# Patient Record
Sex: FEMALE | Race: WHITE | Employment: FULL TIME | URBAN - METROPOLITAN AREA
[De-identification: names, ages, dates, MRNs, and addresses within clinical notes are randomized per-mention and may not be internally consistent; named-entity substitution may affect disease eponyms.]

---

## 2017-02-15 ENCOUNTER — TELEPHONE (OUTPATIENT)
Dept: INTERNAL MEDICINE CLINIC | Facility: CLINIC | Age: 47
End: 2017-02-15

## 2017-02-16 ENCOUNTER — HOSPITAL ENCOUNTER (OUTPATIENT)
Dept: MAMMOGRAPHY | Facility: HOSPITAL | Age: 47
Discharge: HOME | End: 2017-02-16
Attending: FAMILY MEDICINE
Payer: COMMERCIAL

## 2017-02-16 DIAGNOSIS — R92.8 FOLLOW-UP EXAMINATION OF ABNORMAL MAMMOGRAM: ICD-10-CM

## 2017-02-16 PROCEDURE — 77062 BREAST TOMOSYNTHESIS BI: CPT

## 2017-02-16 PROCEDURE — 77066 DX MAMMO INCL CAD BI: CPT

## 2017-02-16 PROCEDURE — 77065 DX MAMMO INCL CAD UNI: CPT

## 2017-02-16 PROCEDURE — 77061 BREAST TOMOSYNTHESIS UNI: CPT

## 2017-02-21 ENCOUNTER — OFFICE VISIT (OUTPATIENT)
Dept: INTERNAL MEDICINE CLINIC | Facility: CLINIC | Age: 47
End: 2017-02-21

## 2017-02-21 VITALS
OXYGEN SATURATION: 98 % | HEART RATE: 104 BPM | WEIGHT: 183.5 LBS | TEMPERATURE: 101 F | BODY MASS INDEX: 33 KG/M2 | DIASTOLIC BLOOD PRESSURE: 70 MMHG | SYSTOLIC BLOOD PRESSURE: 110 MMHG

## 2017-02-21 DIAGNOSIS — J02.0 ACUTE STREPTOCOCCAL PHARYNGITIS: Primary | ICD-10-CM

## 2017-02-21 DIAGNOSIS — J02.9 SORE THROAT: ICD-10-CM

## 2017-02-21 DIAGNOSIS — N76.0 ACUTE VAGINITIS: ICD-10-CM

## 2017-02-21 DIAGNOSIS — Z11.3 SCREENING FOR STD (SEXUALLY TRANSMITTED DISEASE): ICD-10-CM

## 2017-02-21 LAB
CONTROL LINE PRESENT WITH A CLEAR BACKGROUND (YES/NO): YES YES/NO
STREP GRP A CUL-SCR: POSITIVE

## 2017-02-21 PROCEDURE — 87480 CANDIDA DNA DIR PROBE: CPT | Performed by: FAMILY MEDICINE

## 2017-02-21 PROCEDURE — 87880 STREP A ASSAY W/OPTIC: CPT | Performed by: FAMILY MEDICINE

## 2017-02-21 PROCEDURE — 87591 N.GONORRHOEAE DNA AMP PROB: CPT | Performed by: FAMILY MEDICINE

## 2017-02-21 PROCEDURE — 87491 CHLMYD TRACH DNA AMP PROBE: CPT | Performed by: FAMILY MEDICINE

## 2017-02-21 PROCEDURE — 87660 TRICHOMONAS VAGIN DIR PROBE: CPT | Performed by: FAMILY MEDICINE

## 2017-02-21 PROCEDURE — 99214 OFFICE O/P EST MOD 30 MIN: CPT | Performed by: FAMILY MEDICINE

## 2017-02-21 PROCEDURE — 87510 GARDNER VAG DNA DIR PROBE: CPT | Performed by: FAMILY MEDICINE

## 2017-02-21 RX ORDER — AMOXICILLIN AND CLAVULANATE POTASSIUM 875; 125 MG/1; MG/1
1 TABLET, FILM COATED ORAL 2 TIMES DAILY
Qty: 20 TABLET | Refills: 0 | Status: SHIPPED | OUTPATIENT
Start: 2017-02-21 | End: 2017-06-05 | Stop reason: ALTCHOICE

## 2017-02-21 RX ORDER — METHYLPREDNISOLONE 4 MG/1
TABLET ORAL
Qty: 1 KIT | Refills: 0 | Status: SHIPPED | OUTPATIENT
Start: 2017-02-21 | End: 2017-06-05 | Stop reason: ALTCHOICE

## 2017-02-21 NOTE — PROGRESS NOTES
CHIEF COMPLAINT:   Patient presents with:  Fever: up to 102 F, sore throat, body aches       HPI:   Denny Mendez is a 55year old female who presents for upper respiratory symptoms for  5 days.  Patient reports sore throat, fever with Tmax to 102, OTC palpitations   GI: denies N/V/C or abdominal pain  : see HPI  NEURO: + headaches    EXAM:   /70 mmHg  Pulse 104  Temp(Src) 100.5 °F (38.1 °C) (Oral)  Wt 183 lb 8 oz  SpO2 98%  Breastfeeding?  No  GENERAL: well developed, well nourished,in no apparen Tylenol  as tolerated for pain or fever, and to use warm salt-water gargles frequently for throat relief. Diet as tolerated. Vaginal cultures today. Await results. Note given to Pt to return to work on 2/26/17.   The patient indicates understanding of the

## 2017-02-22 LAB
C TRACH DNA SPEC QL NAA+PROBE: NEGATIVE
N GONORRHOEA DNA SPEC QL NAA+PROBE: NEGATIVE

## 2017-02-23 ENCOUNTER — TELEPHONE (OUTPATIENT)
Dept: INTERNAL MEDICINE CLINIC | Facility: CLINIC | Age: 47
End: 2017-02-23

## 2017-02-23 DIAGNOSIS — N89.8 VAGINAL DISCHARGE: Primary | ICD-10-CM

## 2017-02-23 RX ORDER — METRONIDAZOLE 7.5 MG/G
1 GEL VAGINAL NIGHTLY
Qty: 5 TUBE | Refills: 0 | Status: SHIPPED | OUTPATIENT
Start: 2017-02-23 | End: 2017-02-28

## 2017-02-23 NOTE — TELEPHONE ENCOUNTER
Patient informed of orders, medication sent to [address] [place] [address] per patient's request

## 2017-03-08 ENCOUNTER — HOSPITAL ENCOUNTER (OUTPATIENT)
Age: 47
Discharge: HOME | End: 2017-03-08
Attending: FAMILY MEDICINE
Payer: COMMERCIAL

## 2017-03-08 ENCOUNTER — TELEPHONE (OUTPATIENT)
Dept: INTERNAL MEDICINE CLINIC | Facility: CLINIC | Age: 47
End: 2017-03-08

## 2017-03-08 ENCOUNTER — APPOINTMENT (OUTPATIENT)
Dept: GENERAL RADIOLOGY | Age: 47
End: 2017-03-08
Attending: FAMILY MEDICINE
Payer: COMMERCIAL

## 2017-03-08 VITALS
TEMPERATURE: 98 F | WEIGHT: 182 LBS | BODY MASS INDEX: 32 KG/M2 | RESPIRATION RATE: 18 BRPM | DIASTOLIC BLOOD PRESSURE: 84 MMHG | SYSTOLIC BLOOD PRESSURE: 121 MMHG | HEART RATE: 60 BPM | OXYGEN SATURATION: 100 %

## 2017-03-08 DIAGNOSIS — R51.9 NONINTRACTABLE HEADACHE, UNSPECIFIED CHRONICITY PATTERN, UNSPECIFIED HEADACHE TYPE: ICD-10-CM

## 2017-03-08 DIAGNOSIS — E86.0 DEHYDRATION: ICD-10-CM

## 2017-03-08 DIAGNOSIS — R42 VERTIGO: Primary | ICD-10-CM

## 2017-03-08 LAB
#LYMPHOCYTE IC: 2.4 X10ˆ3/UL (ref 0.9–3.2)
#MXD IC: 0.6 X10ˆ3/UL (ref 0.1–1)
#NEUTROPHIL IC: 4.7 X10ˆ3/UL (ref 1.3–6.7)
FREE T4: 1 NG/DL (ref 0.9–1.8)
HCT IC: 39.3 % (ref 37–54)
HGB IC: 13.8 G/DL (ref 11.7–16)
ISTAT TROPONIN: <0.1 NG/ML (ref ?–0.1)
LYMPHOCYTES NFR BLD AUTO: 30.8 %
MCH IC: 31.7 PG (ref 27–33.2)
MCHC IC: 35.1 G/DL (ref 31–37)
MCV IC: 90.1 FL (ref 81–100)
MIXED CELL %: 7.9 %
NEUTROPHILS NFR BLD AUTO: 61.3 %
PLT IC: 189 X10ˆ3/UL (ref 150–450)
POCT BILIRUBIN URINE: NEGATIVE
POCT BLOOD URINE: NEGATIVE
POCT GLUCOSE URINE: NEGATIVE MG/DL
POCT KETONE URINE: 15 MG/DL
POCT NITRITE URINE: NEGATIVE
POCT PH URINE: 5.5 (ref 5–8)
POCT PROTEIN URINE: NEGATIVE MG/DL
POCT SPECIFIC GRAVITY URINE: 1.03
POCT URINE COLOR: YELLOW
POCT URINE PREGNANCY: NEGATIVE
POCT UROBILINOGEN URINE: 0.2 MG/DL
RBC IC: 4.36 X10ˆ6/UL (ref 3.8–5.1)
TSI SER-ACNC: 0.29 MIU/ML (ref 0.35–5.5)
WBC IC: 7.7 X10ˆ3/UL (ref 4–13)

## 2017-03-08 PROCEDURE — 99215 OFFICE O/P EST HI 40 MIN: CPT

## 2017-03-08 PROCEDURE — 87086 URINE CULTURE/COLONY COUNT: CPT | Performed by: FAMILY MEDICINE

## 2017-03-08 PROCEDURE — 93010 ELECTROCARDIOGRAM REPORT: CPT

## 2017-03-08 PROCEDURE — 81025 URINE PREGNANCY TEST: CPT | Performed by: FAMILY MEDICINE

## 2017-03-08 PROCEDURE — 71020 XR CHEST PA + LAT CHEST (CPT=71020): CPT

## 2017-03-08 PROCEDURE — 96360 HYDRATION IV INFUSION INIT: CPT

## 2017-03-08 PROCEDURE — 84443 ASSAY THYROID STIM HORMONE: CPT | Performed by: FAMILY MEDICINE

## 2017-03-08 PROCEDURE — 93005 ELECTROCARDIOGRAM TRACING: CPT

## 2017-03-08 PROCEDURE — 81002 URINALYSIS NONAUTO W/O SCOPE: CPT | Performed by: FAMILY MEDICINE

## 2017-03-08 PROCEDURE — 85025 COMPLETE CBC W/AUTO DIFF WBC: CPT | Performed by: FAMILY MEDICINE

## 2017-03-08 PROCEDURE — 84439 ASSAY OF FREE THYROXINE: CPT | Performed by: FAMILY MEDICINE

## 2017-03-08 PROCEDURE — 84484 ASSAY OF TROPONIN QUANT: CPT

## 2017-03-08 RX ORDER — IBUPROFEN 600 MG/1
600 TABLET ORAL ONCE
Status: COMPLETED | OUTPATIENT
Start: 2017-03-08 | End: 2017-03-08

## 2017-03-08 RX ORDER — MECLIZINE HCL 12.5 MG/1
25 TABLET ORAL ONCE
Status: DISCONTINUED | OUTPATIENT
Start: 2017-03-08 | End: 2017-03-08

## 2017-03-08 RX ORDER — SODIUM CHLORIDE 9 MG/ML
1000 INJECTION, SOLUTION INTRAVENOUS ONCE
Status: COMPLETED | OUTPATIENT
Start: 2017-03-08 | End: 2017-03-08

## 2017-03-08 RX ORDER — MECLIZINE HYDROCHLORIDE 25 MG/1
25 TABLET ORAL 3 TIMES DAILY PRN
Qty: 9 TABLET | Refills: 0 | Status: SHIPPED | OUTPATIENT
Start: 2017-03-08 | End: 2017-03-11

## 2017-03-08 NOTE — ED PROVIDER NOTES
Patient Seen in: [facility] In [address]    History   Patient presents with:  Dizziness (neurologic)    Stated Complaint: 2 WKS SHAKEY,FATIGUE,PASSED OUT @ WORK TODAY & @ [address]    HPI  75-year-old female coming in with complaints of Tab,  Take 1,000 Units by mouth daily. Magnesium Oxide 400 (240 MG) MG Oral Tab,  Take 1 tablet by mouth daily. Amoxicillin-Pot Clavulanate (AUGMENTIN) 875-125 MG Oral Tab,  Take 1 tablet by mouth 2 (two) times daily.    methylPREDNISolone (MEDROL) 4 MG symmetric  Skin: Skin color, texture, turgor normal. No rashes or lesions  Neurologic: Alert and oriented X 3, normal strength and tone. Normal symmetric reflexes.  Normal coordination and gait  Mental status: Alert, oriented, thought content appropriate  C times daily as needed for Dizziness. Dispense:  9 tablet   Refill:  0      Thyroid studies still pending. Patient declined meclizine. Plan of care as discussed below. Headache completely resolved after IVF and Motrin was given.    Patient verbalized un

## 2017-03-08 NOTE — ED INITIAL ASSESSMENT (HPI)
Patient states for the last couple of weeks she has felt lethargic and light headed. Patient states last Friday she passed out and passed out today. Patient states she also feels like her speech is slurred as well.

## 2017-06-05 PROCEDURE — 83001 ASSAY OF GONADOTROPIN (FSH): CPT | Performed by: OBSTETRICS & GYNECOLOGY

## 2017-06-05 PROCEDURE — 87624 HPV HI-RISK TYP POOLED RSLT: CPT | Performed by: OBSTETRICS & GYNECOLOGY

## 2017-06-05 PROCEDURE — 88175 CYTOPATH C/V AUTO FLUID REDO: CPT | Performed by: OBSTETRICS & GYNECOLOGY

## 2017-06-05 PROCEDURE — 36415 COLL VENOUS BLD VENIPUNCTURE: CPT | Performed by: OBSTETRICS & GYNECOLOGY

## 2017-07-07 ENCOUNTER — HOSPITAL ENCOUNTER (OUTPATIENT)
Age: 47
Discharge: HOME | End: 2017-07-07
Payer: COMMERCIAL

## 2017-07-07 ENCOUNTER — APPOINTMENT (OUTPATIENT)
Dept: GENERAL RADIOLOGY | Age: 47
End: 2017-07-07
Attending: PHYSICIAN ASSISTANT
Payer: COMMERCIAL

## 2017-07-07 VITALS
HEART RATE: 75 BPM | BODY MASS INDEX: 33 KG/M2 | OXYGEN SATURATION: 98 % | SYSTOLIC BLOOD PRESSURE: 126 MMHG | DIASTOLIC BLOOD PRESSURE: 74 MMHG | WEIGHT: 189 LBS | RESPIRATION RATE: 14 BRPM | TEMPERATURE: 99 F

## 2017-07-07 DIAGNOSIS — R07.9 CHEST PAIN OF UNCERTAIN ETIOLOGY: ICD-10-CM

## 2017-07-07 DIAGNOSIS — R06.2 WHEEZING WITHOUT DIAGNOSIS OF ASTHMA: ICD-10-CM

## 2017-07-07 DIAGNOSIS — J01.90 ACUTE SINUSITIS, RECURRENCE NOT SPECIFIED, UNSPECIFIED LOCATION: Primary | ICD-10-CM

## 2017-07-07 LAB — POCT RAPID STREP: NEGATIVE

## 2017-07-07 PROCEDURE — 93010 ELECTROCARDIOGRAM REPORT: CPT

## 2017-07-07 PROCEDURE — 93005 ELECTROCARDIOGRAM TRACING: CPT

## 2017-07-07 PROCEDURE — 87081 CULTURE SCREEN ONLY: CPT | Performed by: PHYSICIAN ASSISTANT

## 2017-07-07 PROCEDURE — 94640 AIRWAY INHALATION TREATMENT: CPT

## 2017-07-07 PROCEDURE — 99214 OFFICE O/P EST MOD 30 MIN: CPT

## 2017-07-07 PROCEDURE — 87430 STREP A AG IA: CPT | Performed by: PHYSICIAN ASSISTANT

## 2017-07-07 PROCEDURE — 71020 XR CHEST PA + LAT CHEST (CPT=71020): CPT | Performed by: PHYSICIAN ASSISTANT

## 2017-07-07 RX ORDER — IPRATROPIUM BROMIDE AND ALBUTEROL SULFATE 2.5; .5 MG/3ML; MG/3ML
3 SOLUTION RESPIRATORY (INHALATION) ONCE
Status: COMPLETED | OUTPATIENT
Start: 2017-07-07 | End: 2017-07-07

## 2017-07-07 RX ORDER — ALBUTEROL SULFATE 90 UG/1
2 AEROSOL, METERED RESPIRATORY (INHALATION) EVERY 4 HOURS PRN
Qty: 1 INHALER | Refills: 0 | Status: SHIPPED | OUTPATIENT
Start: 2017-07-07 | End: 2017-08-06

## 2017-07-07 RX ORDER — AZITHROMYCIN 250 MG/1
TABLET, FILM COATED ORAL
Qty: 1 PACKAGE | Refills: 0 | Status: SHIPPED | OUTPATIENT
Start: 2017-07-07 | End: 2017-07-12

## 2017-07-07 RX ORDER — PREDNISONE 20 MG/1
60 TABLET ORAL ONCE
Status: COMPLETED | OUTPATIENT
Start: 2017-07-07 | End: 2017-07-07

## 2017-07-07 RX ORDER — PREDNISONE 20 MG/1
40 TABLET ORAL DAILY
Qty: 8 TABLET | Refills: 0 | Status: SHIPPED | OUTPATIENT
Start: 2017-07-07 | End: 2017-07-11

## 2017-07-07 RX ORDER — ACETAMINOPHEN AND CODEINE PHOSPHATE 120; 12 MG/5ML; MG/5ML
10 SOLUTION ORAL EVERY 6 HOURS PRN
Qty: 200 ML | Refills: 0 | Status: SHIPPED | OUTPATIENT
Start: 2017-07-07 | End: 2017-10-30 | Stop reason: ALTCHOICE

## 2017-07-07 NOTE — ED INITIAL ASSESSMENT (HPI)
Today c/o SOB and burning in chest. Nausea. Denies V/D.  x3 days, facial congestion. Yellow nasal drainage.

## 2017-07-07 NOTE — ED PROVIDER NOTES
Patient Seen in: [facility] END    History   Patient presents with:  Cough/URI    Stated Complaint: SINUS, COUGH, HEAVY CHEST, SORE THROAT, X 3DAYS    HPI    53 yo female here with c/o sore throat pain with sinus pain/pressure, prod complaint: SINUS, COUGH, HEAVY CHEST, SORE THROAT, X 3DAYS  Other systems are as noted in HPI. Constitutional and vital signs reviewed. All other systems reviewed and negative except as noted above.     PSFH elements reviewed from today and agreed exc NAD            EKG    Rate, intervals and axes as noted on EKG Report.   Rate:72 BPM  Rhythm: normal sinus rhythm  Reading: normal EKG         Xr Chest Pa + Lat Chest (gam=01236)    Result Date: 7/7/2017  PROCEDURE:  XR CHEST PA + LAT CHEST (CPT=71020)  IND disposition on file for this visit.     Follow-up:  Charity Gonzalez MD  [id number]  [phone]    Schedule an appointment as soon as possible for a visit  If symptoms worsen and/toussaint for F/U      Medications Prescribed:  Ronn

## 2017-10-06 ENCOUNTER — PRIOR ORIGINAL RECORDS (OUTPATIENT)
Dept: OTHER | Age: 47
End: 2017-10-06

## 2017-10-08 ENCOUNTER — PRIOR ORIGINAL RECORDS (OUTPATIENT)
Dept: OTHER | Age: 47
End: 2017-10-08

## 2017-10-09 PROBLEM — I48.91 ATRIAL FIBRILLATION (HCC): Status: ACTIVE | Noted: 2017-10-09

## 2017-10-10 ENCOUNTER — PRIOR ORIGINAL RECORDS (OUTPATIENT)
Dept: OTHER | Age: 47
End: 2017-10-10

## 2017-10-11 ENCOUNTER — PRIOR ORIGINAL RECORDS (OUTPATIENT)
Dept: OTHER | Age: 47
End: 2017-10-11

## 2017-10-12 ENCOUNTER — HOSPITAL ENCOUNTER (OUTPATIENT)
Dept: CV DIAGNOSTICS | Facility: HOSPITAL | Age: 47
Discharge: HOME | End: 2017-10-12
Attending: INTERNAL MEDICINE
Payer: COMMERCIAL

## 2017-10-12 DIAGNOSIS — R07.9 CHEST PAIN: ICD-10-CM

## 2017-10-12 DIAGNOSIS — I48.91 A-FIB (HCC): ICD-10-CM

## 2017-10-12 PROCEDURE — 93226 XTRNL ECG REC<48 HR SCAN A/R: CPT | Performed by: INTERNAL MEDICINE

## 2017-10-12 PROCEDURE — 93225 XTRNL ECG REC<48 HRS REC: CPT | Performed by: INTERNAL MEDICINE

## 2017-10-12 PROCEDURE — 93227 XTRNL ECG REC<48 HR R&I: CPT | Performed by: INTERNAL MEDICINE

## 2017-10-19 LAB
BUN: 9 MG/DL
CALCIUM: 9.1 MG/DL
CHLORIDE: 107 MEQ/L
CREATININE, SERUM: 0.7 MG/DL
GLUCOSE: 86 MG/DL
HEMATOCRIT: 41.5 %
HEMOGLOBIN: 14.1 G/DL
PLATELETS: 177 K/UL
POTASSIUM, SERUM: 3.9 MEQ/L
RED BLOOD COUNT: 4.6 X 10-6/U
SODIUM: 141 MEQ/L
THYROID STIMULATING HORMONE: 1.03 MLU/L
WHITE BLOOD COUNT: 5.5 X 10-3/U

## 2017-10-23 ENCOUNTER — HOSPITAL ENCOUNTER (OUTPATIENT)
Dept: CV DIAGNOSTICS | Facility: HOSPITAL | Age: 47
Discharge: HOME | End: 2017-10-23
Attending: INTERNAL MEDICINE
Payer: COMMERCIAL

## 2017-10-23 DIAGNOSIS — R07.9 CHEST PAIN: ICD-10-CM

## 2017-10-23 DIAGNOSIS — I48.91 A-FIB (HCC): ICD-10-CM

## 2017-10-23 PROCEDURE — 93018 CV STRESS TEST I&R ONLY: CPT | Performed by: INTERNAL MEDICINE

## 2017-10-23 PROCEDURE — 93350 STRESS TTE ONLY: CPT | Performed by: INTERNAL MEDICINE

## 2017-10-23 PROCEDURE — 93017 CV STRESS TEST TRACING ONLY: CPT | Performed by: INTERNAL MEDICINE

## 2017-10-26 ENCOUNTER — PRIOR ORIGINAL RECORDS (OUTPATIENT)
Dept: OTHER | Age: 47
End: 2017-10-26

## 2017-10-27 ENCOUNTER — MYAURORA ACCOUNT LINK (OUTPATIENT)
Dept: OTHER | Age: 47
End: 2017-10-27

## 2017-10-27 ENCOUNTER — HOSPITAL ENCOUNTER (EMERGENCY)
Facility: HOSPITAL | Age: 47
Discharge: HOME | End: 2017-10-27
Attending: EMERGENCY MEDICINE
Payer: COMMERCIAL

## 2017-10-27 ENCOUNTER — PRIOR ORIGINAL RECORDS (OUTPATIENT)
Dept: OTHER | Age: 47
End: 2017-10-27

## 2017-10-27 VITALS
HEART RATE: 71 BPM | BODY MASS INDEX: 31.18 KG/M2 | TEMPERATURE: 99 F | SYSTOLIC BLOOD PRESSURE: 123 MMHG | RESPIRATION RATE: 16 BRPM | HEIGHT: 63 IN | WEIGHT: 176 LBS | OXYGEN SATURATION: 97 % | DIASTOLIC BLOOD PRESSURE: 82 MMHG

## 2017-10-27 DIAGNOSIS — R55 SYNCOPE, NEAR: Primary | ICD-10-CM

## 2017-10-27 PROCEDURE — 84443 ASSAY THYROID STIM HORMONE: CPT | Performed by: EMERGENCY MEDICINE

## 2017-10-27 PROCEDURE — 99285 EMERGENCY DEPT VISIT HI MDM: CPT

## 2017-10-27 PROCEDURE — 93010 ELECTROCARDIOGRAM REPORT: CPT

## 2017-10-27 PROCEDURE — 93005 ELECTROCARDIOGRAM TRACING: CPT

## 2017-10-27 PROCEDURE — 84484 ASSAY OF TROPONIN QUANT: CPT | Performed by: EMERGENCY MEDICINE

## 2017-10-27 PROCEDURE — 85025 COMPLETE CBC W/AUTO DIFF WBC: CPT | Performed by: EMERGENCY MEDICINE

## 2017-10-27 PROCEDURE — 80053 COMPREHEN METABOLIC PANEL: CPT | Performed by: EMERGENCY MEDICINE

## 2017-10-27 PROCEDURE — 36415 COLL VENOUS BLD VENIPUNCTURE: CPT

## 2017-10-27 NOTE — ED PROVIDER NOTES
Patient Seen in: [facility] Emergency Department    History   Patient presents with:  Syncope (cardiovascular, neurologic)  Arrythmia/Palpitations (cardiovascular)    Stated Complaint: Near syncopal episode while getting out of shower, Hx afib diagn agreed except as otherwise stated in HPI.     Physical Exam   ED Triage Vitals [10/27/17 0744]  BP: (!) 123/110  Pulse: 82  Resp: 21  Temp: 98.7 °F (37.1 °C)  Temp src: Temporal  SpO2: 100 %  O2 Device: None (Room air)    Current:/72   Pulse 73   Temp Please view results for these tests on the individual orders. RAINBOW DRAW BLUE   RAINBOW DRAW LAVENDER   RAINBOW DRAW LIGHT GREEN   RAINBOW DRAW GOLD     EKG: The EKG revealed rate, intervals, and axis as noted on the EKG report.  I have r

## 2017-10-30 ENCOUNTER — OFFICE VISIT (OUTPATIENT)
Dept: INTERNAL MEDICINE CLINIC | Facility: CLINIC | Age: 47
End: 2017-10-30

## 2017-10-30 ENCOUNTER — OFFICE VISIT (OUTPATIENT)
Dept: SLEEP CENTER | Facility: HOSPITAL | Age: 47
End: 2017-10-30
Attending: INTERNAL MEDICINE
Payer: COMMERCIAL

## 2017-10-30 VITALS
SYSTOLIC BLOOD PRESSURE: 112 MMHG | BODY MASS INDEX: 31 KG/M2 | WEIGHT: 174.5 LBS | RESPIRATION RATE: 16 BRPM | TEMPERATURE: 98 F | DIASTOLIC BLOOD PRESSURE: 86 MMHG

## 2017-10-30 DIAGNOSIS — F41.9 ANXIETY: ICD-10-CM

## 2017-10-30 DIAGNOSIS — R07.9 CHEST PAIN, UNSPECIFIED TYPE: Primary | ICD-10-CM

## 2017-10-30 PROCEDURE — 99214 OFFICE O/P EST MOD 30 MIN: CPT | Performed by: FAMILY MEDICINE

## 2017-10-30 PROCEDURE — 95810 POLYSOM 6/> YRS 4/> PARAM: CPT

## 2017-10-30 RX ORDER — FLECAINIDE ACETATE 50 MG/1
TABLET ORAL
COMMUNITY
Start: 2017-10-27 | End: 2018-03-06 | Stop reason: DRUGHIGH

## 2017-10-30 RX ORDER — ALPRAZOLAM 0.25 MG/1
0.25 TABLET ORAL 2 TIMES DAILY PRN
Qty: 30 TABLET | Refills: 0 | Status: SHIPPED | OUTPATIENT
Start: 2017-10-30 | End: 2018-01-04

## 2017-10-30 NOTE — PROGRESS NOTES
CHIEF COMPLAINT:     Patient presents with:  Hospital F/U: Was in hospital with Afib       HPI:   Daniel Jolly is a 52year old female . The patient presents for a recheck after ER visit for complaints of atrial fib. Was seen 10/27.  History: palpitat Smokeless tobacco: Never Used                      Alcohol use:  No                 REVIEW OF SYSTEMS:   GENERAL: Denies fever, chills,weight change, decreased appetite  SKIN: Denies rashes, skin wound daily as needed for Anxiety. Dispense: 30 tablet; Refill: 0    The patient indicates understanding of these issues and agrees to the plan. The patient is asked to call with any concerns.   Chest pain, unspecified type  (primary encounter diagnosis)  Anxie

## 2017-11-02 ENCOUNTER — PRIOR ORIGINAL RECORDS (OUTPATIENT)
Dept: OTHER | Age: 47
End: 2017-11-02

## 2017-11-02 ENCOUNTER — MYAURORA ACCOUNT LINK (OUTPATIENT)
Dept: OTHER | Age: 47
End: 2017-11-02

## 2017-11-02 NOTE — PROCEDURES
[address] [address]       Accredited by the [facility] ([facility])    PATIENT'S NAME:        Stephane Montes  ATTENDING PHYSICIAN:   Ishmael Chapa M.D.   REFERRING PHYSICIAN:   Kristie Tafoya M.D. Respiratory monitoring revealed obstructive apneas and hypopneas with the total apnea-hypopnea index of 5 events per hour.   The supine apnea-hypopnea index was 29 events per hour, the non-supine apnea-hypopnea index was 1 event per hour, the REM apnea-hyp

## 2017-11-06 ENCOUNTER — TELEPHONE (OUTPATIENT)
Dept: INTERNAL MEDICINE CLINIC | Facility: CLINIC | Age: 47
End: 2017-11-06

## 2017-11-06 NOTE — TELEPHONE ENCOUNTER
Patient called to give an update on the medication that she has been taking and would like to speak with the nurse

## 2017-11-06 NOTE — TELEPHONE ENCOUNTER
Xanax made her sleepy or very aggressive /angry does not want to use this med any longer and does not want long term med either still scared Chest pain has subsided would rather talk to someone in office over taking meds

## 2017-11-13 ENCOUNTER — PRIOR ORIGINAL RECORDS (OUTPATIENT)
Dept: OTHER | Age: 47
End: 2017-11-13

## 2017-11-20 LAB
ALBUMIN: 4 G/DL
ALKALINE PHOSPHATATE(ALK PHOS): 75 IU/L
BILIRUBIN TOTAL: 0.3 MG/DL
BUN: 9 MG/DL
CALCIUM: 9.2 MG/DL
CHLORIDE: 109 MEQ/L
CREATININE, SERUM: 0.85 MG/DL
GLUCOSE: 94 MG/DL
HEMATOCRIT: 44.8 %
HEMOGLOBIN: 15.5 G/DL
PLATELETS: 230 K/UL
POTASSIUM, SERUM: 3.5 MEQ/L
PROTEIN, TOTAL: 7.5 G/DL
RED BLOOD COUNT: 5.01 X 10-6/U
SGOT (AST): 15 IU/L
SGPT (ALT): 24 IU/L
SODIUM: 143 MEQ/L
THYROID STIMULATING HORMONE: 1.41 MLU/L
WHITE BLOOD COUNT: 5.2 X 10-3/U

## 2017-12-20 LAB
BUN: 11 MG/DL
BUN: 12 MG/DL
CALCIUM: 10.2 MG/DL
CALCIUM: 8.8 MG/DL
CHLORIDE: 102 MEQ/L
CHLORIDE: 109 MEQ/L
CREATININE, SERUM: 0.6 MG/DL
CREATININE, SERUM: 0.8 MG/DL
GLUCOSE: 104 MG/DL
GLUCOSE: 97 MG/DL
HEMATOCRIT: 47.8 %
HEMOGLOBIN: 16.5 G/DL
MAGNESIUM: 2 MG/DL
PLATELETS: 221 K/UL
POTASSIUM, SERUM: 3.1 MEQ/L
POTASSIUM, SERUM: 4.4 MEQ/L
RED BLOOD COUNT: 5.36 X 10-6/U
SODIUM: 142 MEQ/L
SODIUM: 142 MEQ/L
THYROID STIMULATING HORMONE: 3 MLU/L
WHITE BLOOD COUNT: 9.14 X 10-3/U

## 2018-01-04 ENCOUNTER — OFFICE VISIT (OUTPATIENT)
Dept: INTERNAL MEDICINE CLINIC | Facility: CLINIC | Age: 48
End: 2018-01-04

## 2018-01-04 VITALS
WEIGHT: 176 LBS | RESPIRATION RATE: 16 BRPM | BODY MASS INDEX: 31 KG/M2 | TEMPERATURE: 99 F | HEART RATE: 72 BPM | DIASTOLIC BLOOD PRESSURE: 84 MMHG | OXYGEN SATURATION: 99 % | SYSTOLIC BLOOD PRESSURE: 130 MMHG

## 2018-01-04 DIAGNOSIS — J06.9 URI, ACUTE: Primary | ICD-10-CM

## 2018-01-04 PROCEDURE — 99213 OFFICE O/P EST LOW 20 MIN: CPT | Performed by: FAMILY MEDICINE

## 2018-01-04 RX ORDER — BENZONATATE 100 MG/1
100 CAPSULE ORAL 3 TIMES DAILY PRN
Qty: 30 CAPSULE | Refills: 0 | Status: SHIPPED | OUTPATIENT
Start: 2018-01-04 | End: 2018-02-03

## 2018-01-04 NOTE — PROGRESS NOTES
HPI:    Patient ID: Sheridan Pain is a 52year old female. HPI  HPI:   Sheridan Pain is a 52year old female who presents for upper respiratory symptoms for  2  days.  Patient reports mild ST, congestion, ear pressure  hoarsenss   no f/c   no HA clear  HEENT: atraumatic, normocephalic,ears and throat are clear  NECK: supple,no adenopathy,no bruits  LUNGS: clear to auscultation        ASSESSMENT AND PLAN:   Amber Fang is a 52year old female who presents with Upper Respiratory Infection.  JANET

## 2018-02-05 ENCOUNTER — PRIOR ORIGINAL RECORDS (OUTPATIENT)
Dept: OTHER | Age: 48
End: 2018-02-05

## 2018-03-05 ENCOUNTER — PRIOR ORIGINAL RECORDS (OUTPATIENT)
Dept: OTHER | Age: 48
End: 2018-03-05

## 2018-03-06 ENCOUNTER — LAB ENCOUNTER (OUTPATIENT)
Dept: LAB | Facility: HOSPITAL | Age: 48
End: 2018-03-06
Attending: INTERNAL MEDICINE
Payer: COMMERCIAL

## 2018-03-06 ENCOUNTER — HOSPITAL ENCOUNTER (OUTPATIENT)
Dept: CT IMAGING | Facility: HOSPITAL | Age: 48
Discharge: HOME | End: 2018-03-06
Attending: INTERNAL MEDICINE
Payer: COMMERCIAL

## 2018-03-06 ENCOUNTER — PRIOR ORIGINAL RECORDS (OUTPATIENT)
Dept: OTHER | Age: 48
End: 2018-03-06

## 2018-03-06 DIAGNOSIS — I48.20 CHRONIC ATRIAL FIBRILLATION (HCC): Primary | ICD-10-CM

## 2018-03-06 DIAGNOSIS — I48.20 ATRIAL FIBRILLATION, CHRONIC (HCC): ICD-10-CM

## 2018-03-06 PROBLEM — F41.9 ANXIETY: Status: ACTIVE | Noted: 2018-03-06

## 2018-03-06 LAB
BUN BLD-MCNC: 13 MG/DL (ref 8–20)
CREAT BLD-MCNC: 0.73 MG/DL (ref 0.55–1.02)

## 2018-03-06 PROCEDURE — 82565 ASSAY OF CREATININE: CPT

## 2018-03-06 PROCEDURE — 84520 ASSAY OF UREA NITROGEN: CPT

## 2018-03-06 PROCEDURE — 75572 CT HRT W/3D IMAGE: CPT | Performed by: INTERNAL MEDICINE

## 2018-03-06 PROCEDURE — 36415 COLL VENOUS BLD VENIPUNCTURE: CPT

## 2018-03-07 NOTE — PROGRESS NOTES
Patient presents with: Anxiety      HPI:  Pt is here for a recheck of anxiety. Has not been tolerating the Xanax well. Pt states it made her feel angry. Pt would like to try another prn anxiety medication though.  Pt is still having issues with the atrial Disp:  Rfl:    verapamil HCl  MG Oral Tab CR Take 180 mg by mouth every morning. Disp:  Rfl:    Cholecalciferol (VITAMIN D) 2000 units Oral Cap Take 1 capsule by mouth daily.  Disp:  Rfl:    Rivaroxaban (XARELTO) 20 MG Oral Tab Take 20 mg by mouth nig Refills    ClonazePAM (KLONOPIN) 0.5 MG Oral Tab 30 tablet 0      Sig: Take 1 tablet (0.5 mg total) by mouth 2 (two) times daily as needed for Anxiety. Imaging & Consults:  None  Anxiety- no suicidal or homicidal thoughts.  Change to Klonopin 0.5

## 2018-03-15 ENCOUNTER — PRIOR ORIGINAL RECORDS (OUTPATIENT)
Dept: OTHER | Age: 48
End: 2018-03-15

## 2018-03-19 ENCOUNTER — ANESTHESIA EVENT (OUTPATIENT)
Dept: INTERVENTIONAL RADIOLOGY/VASCULAR | Facility: HOSPITAL | Age: 48
End: 2018-03-19
Payer: COMMERCIAL

## 2018-03-19 NOTE — H&P
: 1970  ACCOUNT:  [id number]  [phone]  PCP: Dr. Taylor Berry     TODAY'S DATE: 2018  DICTATED BY:  [Dr. Nicolas Zhu: [Followup of Atrial fibrillation, currently SR, Followup of Chest pain, precordial and Followup of pressure not elevated. RESP: respirations with normal rate and rhythm, clear to auscultation. GI: no masses, tenderness or hepatosplenomegaly, rectal deferred. MS: adequate gait for exercise/testing. EXT: no clubbing or cyanosis.   SKIN: no rashes, lesions,

## 2018-03-20 ENCOUNTER — HOSPITAL ENCOUNTER (OUTPATIENT)
Dept: INTERVENTIONAL RADIOLOGY/VASCULAR | Facility: HOSPITAL | Age: 48
Setting detail: OBSERVATION
Discharge: HOME | End: 2018-03-21
Attending: INTERNAL MEDICINE | Admitting: INTERNAL MEDICINE
Payer: COMMERCIAL

## 2018-03-20 ENCOUNTER — PRIOR ORIGINAL RECORDS (OUTPATIENT)
Dept: OTHER | Age: 48
End: 2018-03-20

## 2018-03-20 ENCOUNTER — ANESTHESIA (OUTPATIENT)
Dept: INTERVENTIONAL RADIOLOGY/VASCULAR | Facility: HOSPITAL | Age: 48
End: 2018-03-20
Payer: COMMERCIAL

## 2018-03-20 DIAGNOSIS — I48.91 ATRIAL FIBRILLATION (HCC): Primary | ICD-10-CM

## 2018-03-20 DIAGNOSIS — I48.91 A-FIB (HCC): ICD-10-CM

## 2018-03-20 LAB
BASOPHILS # BLD AUTO: 0.04 X10(3) UL (ref 0–0.1)
BASOPHILS NFR BLD AUTO: 0.7 %
EOSINOPHIL # BLD AUTO: 0.05 X10(3) UL (ref 0–0.3)
EOSINOPHIL NFR BLD AUTO: 0.9 %
ERYTHROCYTE [DISTWIDTH] IN BLOOD BY AUTOMATED COUNT: 12.1 % (ref 11.5–16)
HCT VFR BLD AUTO: 45.3 % (ref 34–50)
HGB BLD-MCNC: 15.4 G/DL (ref 12–16)
IMMATURE GRANULOCYTE COUNT: 0.01 X10(3) UL (ref 0–1)
IMMATURE GRANULOCYTE RATIO %: 0.2 %
ISTAT ACTIVATED CLOTTING TIME: 136 SECONDS (ref 74–137)
ISTAT ACTIVATED CLOTTING TIME: 219 SECONDS (ref 74–137)
ISTAT ACTIVATED CLOTTING TIME: 241 SECONDS (ref 74–137)
ISTAT ACTIVATED CLOTTING TIME: 285 SECONDS (ref 74–137)
LYMPHOCYTES # BLD AUTO: 1.71 X10(3) UL (ref 0.9–4)
LYMPHOCYTES NFR BLD AUTO: 31.8 %
MCH RBC QN AUTO: 30.6 PG (ref 27–33.2)
MCHC RBC AUTO-ENTMCNC: 34 G/DL (ref 31–37)
MCV RBC AUTO: 89.9 FL (ref 81–100)
MONOCYTES # BLD AUTO: 0.5 X10(3) UL (ref 0.1–1)
MONOCYTES NFR BLD AUTO: 9.3 %
NEUTROPHIL ABS PRELIM: 3.07 X10 (3) UL (ref 1.3–6.7)
NEUTROPHILS # BLD AUTO: 3.07 X10(3) UL (ref 1.3–6.7)
NEUTROPHILS NFR BLD AUTO: 57.1 %
PLATELET # BLD AUTO: 240 10(3)UL (ref 150–450)
RBC # BLD AUTO: 5.04 X10(6)UL (ref 3.8–5.1)
RED CELL DISTRIBUTION WIDTH-SD: 39.7 FL (ref 35.1–46.3)
WBC # BLD AUTO: 5.4 X10(3) UL (ref 4–13)

## 2018-03-20 PROCEDURE — 93010 ELECTROCARDIOGRAM REPORT: CPT | Performed by: INTERNAL MEDICINE

## 2018-03-20 PROCEDURE — B246YZZ ULTRASONOGRAPHY OF RIGHT AND LEFT HEART USING OTHER CONTRAST: ICD-10-PCS | Performed by: INTERNAL MEDICINE

## 2018-03-20 PROCEDURE — 93005 ELECTROCARDIOGRAM TRACING: CPT

## 2018-03-20 PROCEDURE — 93656 COMPRE EP EVAL ABLTJ ATR FIB: CPT

## 2018-03-20 PROCEDURE — 93662 INTRACARDIAC ECG (ICE): CPT

## 2018-03-20 PROCEDURE — 93655 ICAR CATH ABLTJ DSCRT ARRHYT: CPT

## 2018-03-20 PROCEDURE — 85025 COMPLETE CBC W/AUTO DIFF WBC: CPT | Performed by: ANESTHESIOLOGY

## 2018-03-20 PROCEDURE — 93613 INTRACARDIAC EPHYS 3D MAPG: CPT

## 2018-03-20 PROCEDURE — 02K83ZZ MAP CONDUCTION MECHANISM, PERCUTANEOUS APPROACH: ICD-10-PCS | Performed by: INTERNAL MEDICINE

## 2018-03-20 PROCEDURE — 02583ZZ DESTRUCTION OF CONDUCTION MECHANISM, PERCUTANEOUS APPROACH: ICD-10-PCS | Performed by: INTERNAL MEDICINE

## 2018-03-20 PROCEDURE — 85347 COAGULATION TIME ACTIVATED: CPT

## 2018-03-20 RX ORDER — MIDAZOLAM HYDROCHLORIDE 1 MG/ML
INJECTION INTRAMUSCULAR; INTRAVENOUS
Status: COMPLETED
Start: 2018-03-20 | End: 2018-03-20

## 2018-03-20 RX ORDER — MIDAZOLAM HYDROCHLORIDE 1 MG/ML
1 INJECTION INTRAMUSCULAR; INTRAVENOUS EVERY 5 MIN PRN
Status: DISCONTINUED | OUTPATIENT
Start: 2018-03-20 | End: 2018-03-20 | Stop reason: HOSPADM

## 2018-03-20 RX ORDER — ACETAMINOPHEN 325 MG/1
650 TABLET ORAL EVERY 6 HOURS PRN
Status: DISCONTINUED | OUTPATIENT
Start: 2018-03-20 | End: 2018-03-21

## 2018-03-20 RX ORDER — ASPIRIN 325 MG
325 TABLET, DELAYED RELEASE (ENTERIC COATED) ORAL DAILY
Status: DISCONTINUED | OUTPATIENT
Start: 2018-03-20 | End: 2018-03-21

## 2018-03-20 RX ORDER — HEPARIN SODIUM 1000 [USP'U]/ML
INJECTION, SOLUTION INTRAVENOUS; SUBCUTANEOUS
Status: COMPLETED
Start: 2018-03-20 | End: 2018-03-20

## 2018-03-20 RX ORDER — SODIUM CHLORIDE 9 MG/ML
INJECTION, SOLUTION INTRAVENOUS CONTINUOUS
Status: DISCONTINUED | OUTPATIENT
Start: 2018-03-20 | End: 2018-03-21

## 2018-03-20 RX ORDER — FLECAINIDE ACETATE 100 MG/1
100 TABLET ORAL 2 TIMES DAILY
Status: DISCONTINUED | OUTPATIENT
Start: 2018-03-20 | End: 2018-03-21

## 2018-03-20 RX ORDER — ONDANSETRON 2 MG/ML
4 INJECTION INTRAMUSCULAR; INTRAVENOUS AS NEEDED
Status: DISCONTINUED | OUTPATIENT
Start: 2018-03-20 | End: 2018-03-20 | Stop reason: HOSPADM

## 2018-03-20 RX ORDER — CLONAZEPAM 0.5 MG/1
0.5 TABLET ORAL 2 TIMES DAILY PRN
Status: DISCONTINUED | OUTPATIENT
Start: 2018-03-20 | End: 2018-03-21

## 2018-03-20 RX ORDER — LIDOCAINE HYDROCHLORIDE 10 MG/ML
INJECTION, SOLUTION INFILTRATION; PERINEURAL
Status: COMPLETED
Start: 2018-03-20 | End: 2018-03-20

## 2018-03-20 RX ORDER — MORPHINE SULFATE 4 MG/ML
2 INJECTION, SOLUTION INTRAMUSCULAR; INTRAVENOUS EVERY 5 MIN PRN
Status: DISCONTINUED | OUTPATIENT
Start: 2018-03-20 | End: 2018-03-20 | Stop reason: HOSPADM

## 2018-03-20 RX ORDER — PROTAMINE SULFATE 10 MG/ML
INJECTION, SOLUTION INTRAVENOUS
Status: COMPLETED
Start: 2018-03-20 | End: 2018-03-20

## 2018-03-20 RX ORDER — NALOXONE HYDROCHLORIDE 0.4 MG/ML
80 INJECTION, SOLUTION INTRAMUSCULAR; INTRAVENOUS; SUBCUTANEOUS AS NEEDED
Status: DISCONTINUED | OUTPATIENT
Start: 2018-03-20 | End: 2018-03-20 | Stop reason: HOSPADM

## 2018-03-20 RX ORDER — SODIUM CHLORIDE, SODIUM LACTATE, POTASSIUM CHLORIDE, CALCIUM CHLORIDE 600; 310; 30; 20 MG/100ML; MG/100ML; MG/100ML; MG/100ML
INJECTION, SOLUTION INTRAVENOUS CONTINUOUS
Status: DISCONTINUED | OUTPATIENT
Start: 2018-03-20 | End: 2018-03-20 | Stop reason: HOSPADM

## 2018-03-20 RX ORDER — HEPARIN SODIUM 5000 [USP'U]/ML
INJECTION, SOLUTION INTRAVENOUS; SUBCUTANEOUS
Status: COMPLETED
Start: 2018-03-20 | End: 2018-03-20

## 2018-03-20 RX ORDER — HYDROCODONE BITARTRATE AND ACETAMINOPHEN 5; 325 MG/1; MG/1
1 TABLET ORAL AS NEEDED
Status: DISCONTINUED | OUTPATIENT
Start: 2018-03-20 | End: 2018-03-20 | Stop reason: HOSPADM

## 2018-03-20 RX ORDER — HYDROCODONE BITARTRATE AND ACETAMINOPHEN 5; 325 MG/1; MG/1
2 TABLET ORAL AS NEEDED
Status: DISCONTINUED | OUTPATIENT
Start: 2018-03-20 | End: 2018-03-20 | Stop reason: HOSPADM

## 2018-03-20 RX ORDER — METOCLOPRAMIDE HYDROCHLORIDE 5 MG/ML
10 INJECTION INTRAMUSCULAR; INTRAVENOUS AS NEEDED
Status: DISCONTINUED | OUTPATIENT
Start: 2018-03-20 | End: 2018-03-20 | Stop reason: HOSPADM

## 2018-03-20 RX ADMIN — SODIUM CHLORIDE: 9 INJECTION, SOLUTION INTRAVENOUS at 16:47:00

## 2018-03-20 RX ADMIN — MIDAZOLAM HYDROCHLORIDE 0.5 MG: 1 INJECTION INTRAMUSCULAR; INTRAVENOUS at 16:40:00

## 2018-03-20 RX ADMIN — MIDAZOLAM HYDROCHLORIDE 0.5 MG: 1 INJECTION INTRAMUSCULAR; INTRAVENOUS at 16:25:00

## 2018-03-20 RX ADMIN — FLECAINIDE ACETATE 100 MG: 100 TABLET ORAL at 21:22:00

## 2018-03-20 RX ADMIN — ACETAMINOPHEN 650 MG: 325 TABLET ORAL at 18:14:00

## 2018-03-20 RX ADMIN — MIDAZOLAM HYDROCHLORIDE 0.5 MG: 1 INJECTION INTRAMUSCULAR; INTRAVENOUS at 17:32:00

## 2018-03-20 NOTE — ANESTHESIA PREPROCEDURE EVALUATION
PRE-OP EVALUATION    Patient Name: Sohail Drake    Pre-op Diagnosis: atrial fibrillation    Cath EP with ablation. [facility]    Pre-op vitals reviewed. Body mass index is 31.07 kg/m². Current medications reviewed.   Hospital Medications:    N Airway      Mallampati: II  Mouth opening: >3 FB  TM distance: > 6 cm  Neck ROM: full Cardiovascular      Rhythm: irregular  Rate: normal     Dental    No notable dental history.          Pulmonary    Pulmonary exam normal.  Breath sounds clear to [employer]

## 2018-03-20 NOTE — H&P
[facility]  H&P    Roman Parkinson Patient Status:  Outpatient    1970 MRN [id number]   [facility] Sugey [facility] MD   Hosp Day # 0 PCP Misty Reid MD     Reason she has never smoked. She has never used smokeless tobacco. She reports that she does not drink alcohol or use drugs.     Allergies:  No Known Allergies    Medications:    Current Facility-Administered Medications:   •  iohexol (OMNIPAQUE) 350 MG/ML injecti

## 2018-03-20 NOTE — ANESTHESIA POSTPROCEDURE EVALUATION
[address] Patient Status:  Outpatient   Age/Gender 50year old female MRN [id number]   Location [address] [facility] Attending Shelton Cisse MD   Hosp Day # 0 PCP Sakina Ramirez MD       Anesthesia Post-op Note

## 2018-03-20 NOTE — PROCEDURES
[facility]   Procedure Note    Maegan Galvin Location: Cath Lab   CSN [id number] MRN [id number]   Admission Date 3/20/2018 Operation Date 3/20/2018   Attending Physician Rachael Serna MD Operating Physician Luci Payne MD     Pre-Operative Isaac Greene clearly   visualized within the interatrial septum. The needle was advanced   through the fossa ovalis, this was confirmed by injecting IV contrast,  and visualizing it by echocardiography and fluoroscopy.  The dilator   and sheath were gently advanced over was   achieved. Catheter positions were monitored and   shadowed by the NavX mapping system as well. We continued with RF   application until bidirectional block across the cavotricuspid isthmus was achieved.    Bidirectional block was confirmed by pacing

## 2018-03-21 ENCOUNTER — APPOINTMENT (OUTPATIENT)
Dept: CV DIAGNOSTICS | Facility: HOSPITAL | Age: 48
End: 2018-03-21
Attending: INTERNAL MEDICINE
Payer: COMMERCIAL

## 2018-03-21 VITALS
DIASTOLIC BLOOD PRESSURE: 80 MMHG | TEMPERATURE: 99 F | WEIGHT: 181 LBS | BODY MASS INDEX: 30.9 KG/M2 | HEART RATE: 79 BPM | RESPIRATION RATE: 20 BRPM | OXYGEN SATURATION: 100 % | HEIGHT: 64 IN | SYSTOLIC BLOOD PRESSURE: 123 MMHG

## 2018-03-21 LAB
ATRIAL RATE: 72 BPM
ATRIAL RATE: 75 BPM
P AXIS: 31 DEGREES
P AXIS: 52 DEGREES
P-R INTERVAL: 146 MS
P-R INTERVAL: 152 MS
Q-T INTERVAL: 408 MS
Q-T INTERVAL: 418 MS
QRS DURATION: 92 MS
QRS DURATION: 94 MS
QTC CALCULATION (BEZET): 455 MS
QTC CALCULATION (BEZET): 457 MS
R AXIS: 24 DEGREES
R AXIS: 34 DEGREES
T AXIS: 22 DEGREES
T AXIS: 36 DEGREES
VENTRICULAR RATE: 72 BPM
VENTRICULAR RATE: 75 BPM

## 2018-03-21 PROCEDURE — 93307 TTE W/O DOPPLER COMPLETE: CPT | Performed by: INTERNAL MEDICINE

## 2018-03-21 RX ORDER — DOCUSATE SODIUM 100 MG/1
100 CAPSULE, LIQUID FILLED ORAL 2 TIMES DAILY PRN
Status: DISCONTINUED | OUTPATIENT
Start: 2018-03-21 | End: 2018-03-21

## 2018-03-21 RX ORDER — KETOROLAC TROMETHAMINE 30 MG/ML
15 INJECTION, SOLUTION INTRAMUSCULAR; INTRAVENOUS ONCE
Status: COMPLETED | OUTPATIENT
Start: 2018-03-21 | End: 2018-03-21

## 2018-03-21 RX ORDER — IBUPROFEN 400 MG/1
400 TABLET ORAL EVERY 6 HOURS PRN
Qty: 30 TABLET | Refills: 0 | Status: SHIPPED | OUTPATIENT
Start: 2018-03-21 | End: 2018-05-21

## 2018-03-21 RX ORDER — KETOROLAC TROMETHAMINE 15 MG/ML
30 INJECTION, SOLUTION INTRAMUSCULAR; INTRAVENOUS ONCE
Status: COMPLETED | OUTPATIENT
Start: 2018-03-21 | End: 2018-03-21

## 2018-03-21 RX ORDER — PANTOPRAZOLE SODIUM 40 MG/1
40 TABLET, DELAYED RELEASE ORAL
Qty: 30 TABLET | Refills: 1 | Status: SHIPPED | OUTPATIENT
Start: 2018-03-22 | End: 2018-05-25 | Stop reason: ALTCHOICE

## 2018-03-21 RX ORDER — IBUPROFEN 400 MG/1
400 TABLET ORAL EVERY 6 HOURS PRN
Status: DISCONTINUED | OUTPATIENT
Start: 2018-03-21 | End: 2018-03-21

## 2018-03-21 RX ORDER — KETOROLAC TROMETHAMINE 15 MG/ML
15 INJECTION, SOLUTION INTRAMUSCULAR; INTRAVENOUS ONCE AS NEEDED
Status: DISCONTINUED | OUTPATIENT
Start: 2018-03-21 | End: 2018-03-21

## 2018-03-21 RX ORDER — IBUPROFEN 400 MG/1
400 TABLET ORAL EVERY 6 HOURS PRN
Qty: 30 TABLET | Refills: 0 | Status: SHIPPED | COMMUNITY
Start: 2018-03-21 | End: 2018-03-21

## 2018-03-21 RX ORDER — PANTOPRAZOLE SODIUM 40 MG/1
40 TABLET, DELAYED RELEASE ORAL
Status: DISCONTINUED | OUTPATIENT
Start: 2018-03-21 | End: 2018-03-21

## 2018-03-21 RX ADMIN — CLONAZEPAM 0.25 MG: 0.5 TABLET ORAL at 00:37:00

## 2018-03-21 RX ADMIN — CLONAZEPAM 0.25 MG: 0.5 TABLET ORAL at 11:48:00

## 2018-03-21 RX ADMIN — IBUPROFEN 400 MG: 400 TABLET ORAL at 12:34:00

## 2018-03-21 RX ADMIN — KETOROLAC TROMETHAMINE 30 MG: 15 INJECTION, SOLUTION INTRAMUSCULAR; INTRAVENOUS at 09:40:00

## 2018-03-21 RX ADMIN — ACETAMINOPHEN 650 MG: 325 TABLET ORAL at 06:36:00

## 2018-03-21 RX ADMIN — ACETAMINOPHEN 650 MG: 325 TABLET ORAL at 00:20:00

## 2018-03-21 RX ADMIN — KETOROLAC TROMETHAMINE 15 MG: 30 INJECTION, SOLUTION INTRAMUSCULAR; INTRAVENOUS at 16:53:00

## 2018-03-21 RX ADMIN — FLECAINIDE ACETATE 100 MG: 100 TABLET ORAL at 08:47:00

## 2018-03-21 RX ADMIN — Medication 180 MG: at 08:47:00

## 2018-03-21 RX ADMIN — PANTOPRAZOLE SODIUM 40 MG: 40 TABLET, DELAYED RELEASE ORAL at 09:40:00

## 2018-03-21 NOTE — PLAN OF CARE
CARDIOVASCULAR - ADULT    • Maintains optimal cardiac output and hemodynamic stability Progressing    • Absence of cardiac arrhythmias or at baseline Progressing    Pt. s/p cryoablation and radiofrequency ablation. VSS. R and L groin sites stable and soft.

## 2018-03-21 NOTE — PROGRESS NOTES
Tele & iv site removed (catheter intact). Discharge paperwork, post procedure precautions and restrictions, follow-up appointments needed, discharge med rec, new medications, and all medication side effects reviewed in depth with patient.   All questions a

## 2018-03-21 NOTE — PLAN OF CARE
POD #1 ablation by Dr. Allen Rob    Comments: Pt is A&OX4 (very anxious), VSS on RA, and maintaining NSR on telemetry. Bilateral groins are C/D/I, without any S/S complications, dressings removed, and sites left LIZBETH.   C/o generalized chest pain w/deep inspir cardiac monitoring, monitor vital signs, obtain 12 lead EKG if indicated  - Evaluate effectiveness of antiarrhythmic and heart rate control medications as ordered  - Initiate emergency measures for life threatening arrhythmias  - Monitor electrolytes and a

## 2018-03-21 NOTE — PROGRESS NOTES
03/21/18 0936   Clinical Encounter Type   Visited With Patient  (w/ patient's friend)   Routine Visit (Responded to the consult)   Continue Visiting (Encouraged patient to call  as needed.   will remain available at the pager # [phone] as ne

## 2018-03-21 NOTE — PAYOR COMM NOTE
--------------  ADMISSION REVIEW     Payor: Kaitlin DURAN  Subscriber #:  [id number]  Authorization Number: N/A    Admit date: N/A  Admit time: N/A       Admitting Physician: Amber Kraus MD  Attending Physician:  Amber Kraus MD  Primary History:   Diagnosis Date   • A-fib (HCC)    • KARMA (obstructive sleep apnea) 10/30/17-[facility]    AHI 5 REM AHI 10 Supine AHI 29 non-supine AHI 1 Sao2 Theodore 90%    • Pain in joint, shoulder region 9/23/11    right   • Palpitations 1/31/11   • Ventricular ta Electrophysiolgy  3/20/2018  3:30 PM  Procedure Note  Pre-Operative Diagnosis: Atrial fibrillation  Post-Operative Diagnosis: Same as above  Procedure Performed: EP & ABLATE [address]  1. Comprehensive electrophysiology study.    2. Coronary sinu sheath were gently advanced over the needle followed by removal of the dilator and needle. The SL-1 sheath was appropriately flushed. The SL-1 sheath was then exchanged over a long guidewire for a CryoCathsheath.  A heparin bolus was given prior to transept proximal coronary sinus as well as the distal Duodecapolar catheter. After ruling out the presence of a pericardial effusion byintracardiac echocardiography at the conclusion of left atrial ablation, we exchanged this for a Duodecapolar catheter.  Deboraete IN LAST 1 DAY:  acetaminophen (TYLENOL) tab 650 mg     Date Action Dose Route User    3/21/2018 0020 Given 650 mg Oral Rigo Beck RN    3/20/2018 1814 Given 650 mg Oral  Landon, RN      Flecainide Acetate (TAMBOCOR) tab 100 mg     Date Action D

## 2018-03-21 NOTE — PROGRESS NOTES
Assumed care of patient; white board updated and pt notified of POC (all questions answered at this time).

## 2018-03-21 NOTE — PROGRESS NOTES
[facility] Progress Note    Subjective:   c/o esophageal and pleuritic chest pain. Denies SOB, dizziness, palpitations.       Objective:  /59 (BP Location: Right arm)   Pulse 70   Temp 98.3 °F (36.8 °C) (Oral)   Resp 20   Ht 162.6 cm (5' 4\")

## 2018-03-21 NOTE — PLAN OF CARE
To whom it may concern,     Margoth Carbone has been under our care as an inpatient at [facility].  She can return to work Monday 3/26/18 as long as she is feeling well. For further questions or concerns please call our office. Nhung Tovar !

## 2018-03-27 ENCOUNTER — PRIOR ORIGINAL RECORDS (OUTPATIENT)
Dept: OTHER | Age: 48
End: 2018-03-27

## 2018-04-06 ENCOUNTER — MYAURORA ACCOUNT LINK (OUTPATIENT)
Dept: OTHER | Age: 48
End: 2018-04-06

## 2018-04-06 ENCOUNTER — PRIOR ORIGINAL RECORDS (OUTPATIENT)
Dept: OTHER | Age: 48
End: 2018-04-06

## 2018-04-12 LAB
BUN: 13 MG/DL
CREATININE, SERUM: 0.73 MG/DL
HEMATOCRIT: 45.3 %
HEMOGLOBIN: 15.4 G/DL
PLATELETS: 240 K/UL
RED BLOOD COUNT: 5.04 X 10-6/U
WHITE BLOOD COUNT: 5.4 X 10-3/U

## 2018-05-21 ENCOUNTER — TELEPHONE (OUTPATIENT)
Dept: INTERNAL MEDICINE CLINIC | Facility: CLINIC | Age: 48
End: 2018-05-21

## 2018-05-21 ENCOUNTER — OFFICE VISIT (OUTPATIENT)
Dept: INTERNAL MEDICINE CLINIC | Facility: CLINIC | Age: 48
End: 2018-05-21

## 2018-05-21 ENCOUNTER — HOSPITAL ENCOUNTER (OUTPATIENT)
Dept: ULTRASOUND IMAGING | Age: 48
Discharge: HOME | End: 2018-05-21
Attending: FAMILY MEDICINE
Payer: COMMERCIAL

## 2018-05-21 VITALS
TEMPERATURE: 98 F | SYSTOLIC BLOOD PRESSURE: 96 MMHG | BODY MASS INDEX: 28 KG/M2 | HEART RATE: 66 BPM | WEIGHT: 162.25 LBS | DIASTOLIC BLOOD PRESSURE: 64 MMHG | OXYGEN SATURATION: 99 % | RESPIRATION RATE: 12 BRPM

## 2018-05-21 DIAGNOSIS — N89.8 VAGINAL DISCHARGE: Primary | ICD-10-CM

## 2018-05-21 DIAGNOSIS — R10.2 PELVIC PAIN: ICD-10-CM

## 2018-05-21 PROCEDURE — 87480 CANDIDA DNA DIR PROBE: CPT | Performed by: FAMILY MEDICINE

## 2018-05-21 PROCEDURE — 87591 N.GONORRHOEAE DNA AMP PROB: CPT | Performed by: FAMILY MEDICINE

## 2018-05-21 PROCEDURE — 76830 TRANSVAGINAL US NON-OB: CPT | Performed by: FAMILY MEDICINE

## 2018-05-21 PROCEDURE — 87660 TRICHOMONAS VAGIN DIR PROBE: CPT | Performed by: FAMILY MEDICINE

## 2018-05-21 PROCEDURE — 87510 GARDNER VAG DNA DIR PROBE: CPT | Performed by: FAMILY MEDICINE

## 2018-05-21 PROCEDURE — 99213 OFFICE O/P EST LOW 20 MIN: CPT | Performed by: FAMILY MEDICINE

## 2018-05-21 PROCEDURE — 87491 CHLMYD TRACH DNA AMP PROBE: CPT | Performed by: FAMILY MEDICINE

## 2018-05-21 PROCEDURE — 76856 US EXAM PELVIC COMPLETE: CPT | Performed by: FAMILY MEDICINE

## 2018-05-21 RX ORDER — HYDROCODONE BITARTRATE AND ACETAMINOPHEN 5; 325 MG/1; MG/1
1 TABLET ORAL EVERY 6 HOURS PRN
Qty: 20 TABLET | Refills: 0 | Status: SHIPPED | OUTPATIENT
Start: 2018-05-21 | End: 2018-08-27 | Stop reason: ALTCHOICE

## 2018-05-21 RX ORDER — ERGOCALCIFEROL (VITAMIN D2) 10 MCG
1 TABLET ORAL 2 TIMES DAILY
COMMUNITY
End: 2018-05-25 | Stop reason: ALTCHOICE

## 2018-05-21 NOTE — PROGRESS NOTES
HPI:   Shirley Casas is a 50year old female   Patient presents with c/o vaginal discharge for the past 1 days. She c/o pelvic pain since Friday and it has been worsening.  The pain can radiate from hip to hip Pain level- 7, pain is a dull, ache,pressur 10/30/17-[facility]    AHI 5 REM AHI 10 Supine AHI 29 non-supine AHI 1 Sao2 Theodore 90%    • Pain in joint, shoulder region 11    right   • Palpitations 11   • Ventricular tachycardia [facility])       Past Surgical History:  No date:       Comment Encounter      Chlamydia/Gc Amplification [E]      Vaginitis/Vaginosis, Dna Probe    Meds & Refills for this Visit:  Signed Prescriptions Disp Refills    HYDROcodone-acetaminophen (NORCO) 5-325 MG Oral Tab 20 tablet 0      Sig: Take 1 tablet by mouth every

## 2018-05-21 NOTE — TELEPHONE ENCOUNTER
Spoke with Pt and gave her the results. ( see progress note from today) Pt also given a copy of the results to take to her Gyne for f/u.

## 2018-05-24 RX ORDER — METRONIDAZOLE 7.5 MG/G
1 GEL VAGINAL NIGHTLY
Qty: 1 TUBE | Refills: 0 | Status: SHIPPED | OUTPATIENT
Start: 2018-05-24 | End: 2018-05-29

## 2018-05-25 ENCOUNTER — TELEPHONE (OUTPATIENT)
Dept: INTERNAL MEDICINE CLINIC | Facility: CLINIC | Age: 48
End: 2018-05-25

## 2018-05-25 NOTE — TELEPHONE ENCOUNTER
Patient called requesting to speak with the nurse. Would like to discuss previous lab tests that she had done.  Patient would like to know the name of the bacterial infection that she previously had, as well as the name of the medication she was prescribed

## 2018-05-30 ENCOUNTER — PRIOR ORIGINAL RECORDS (OUTPATIENT)
Dept: OTHER | Age: 48
End: 2018-05-30

## 2018-06-13 ENCOUNTER — HOSPITAL ENCOUNTER (OUTPATIENT)
Dept: ULTRASOUND IMAGING | Age: 48
Discharge: HOME | End: 2018-06-13
Attending: OBSTETRICS & GYNECOLOGY
Payer: COMMERCIAL

## 2018-06-13 DIAGNOSIS — N83.209 CYST OF OVARY, UNSPECIFIED LATERALITY: ICD-10-CM

## 2018-06-13 DIAGNOSIS — R10.2 PELVIC PAIN: ICD-10-CM

## 2018-06-13 PROCEDURE — 76856 US EXAM PELVIC COMPLETE: CPT | Performed by: OBSTETRICS & GYNECOLOGY

## 2018-06-13 PROCEDURE — 76830 TRANSVAGINAL US NON-OB: CPT | Performed by: OBSTETRICS & GYNECOLOGY

## 2018-06-14 ENCOUNTER — HOSPITAL ENCOUNTER (OUTPATIENT)
Dept: CV DIAGNOSTICS | Facility: HOSPITAL | Age: 48
Discharge: HOME | End: 2018-06-14
Attending: INTERNAL MEDICINE
Payer: COMMERCIAL

## 2018-06-14 DIAGNOSIS — I48.20 CHRONIC ATRIAL FIBRILLATION (HCC): ICD-10-CM

## 2018-06-14 DIAGNOSIS — R00.2 AWARENESS OF HEART BEAT: ICD-10-CM

## 2018-06-14 PROCEDURE — 93226 XTRNL ECG REC<48 HR SCAN A/R: CPT | Performed by: INTERNAL MEDICINE

## 2018-06-14 PROCEDURE — 93227 XTRNL ECG REC<48 HR R&I: CPT | Performed by: INTERNAL MEDICINE

## 2018-06-14 PROCEDURE — 93225 XTRNL ECG REC<48 HRS REC: CPT | Performed by: INTERNAL MEDICINE

## 2018-06-22 ENCOUNTER — PRIOR ORIGINAL RECORDS (OUTPATIENT)
Dept: OTHER | Age: 48
End: 2018-06-22

## 2018-06-26 NOTE — PROGRESS NOTES
TPC to patient. Patient states pain has resolved. Advised of MD recommendation above. Patient agrees to plan and verbalized understanding. Order placed for f/u ultrasound.

## 2018-07-14 ENCOUNTER — HOSPITAL ENCOUNTER (EMERGENCY)
Age: 48
Discharge: HOME | End: 2018-07-14
Attending: EMERGENCY MEDICINE
Payer: COMMERCIAL

## 2018-07-14 ENCOUNTER — APPOINTMENT (OUTPATIENT)
Dept: GENERAL RADIOLOGY | Age: 48
End: 2018-07-14
Attending: EMERGENCY MEDICINE
Payer: COMMERCIAL

## 2018-07-14 VITALS
HEIGHT: 63 IN | HEART RATE: 65 BPM | BODY MASS INDEX: 27.82 KG/M2 | WEIGHT: 157 LBS | DIASTOLIC BLOOD PRESSURE: 71 MMHG | TEMPERATURE: 99 F | OXYGEN SATURATION: 98 % | RESPIRATION RATE: 18 BRPM | SYSTOLIC BLOOD PRESSURE: 111 MMHG

## 2018-07-14 DIAGNOSIS — R07.89 CHEST PAIN, ATYPICAL: Primary | ICD-10-CM

## 2018-07-14 LAB
ALBUMIN SERPL-MCNC: 3.9 G/DL (ref 3.5–4.8)
ALP LIVER SERPL-CCNC: 78 U/L (ref 39–100)
ALT SERPL-CCNC: 21 U/L (ref 14–54)
AST SERPL-CCNC: 13 U/L (ref 15–41)
BASOPHILS # BLD AUTO: 0.06 X10(3) UL (ref 0–0.1)
BASOPHILS NFR BLD AUTO: 0.8 %
BILIRUB SERPL-MCNC: 0.3 MG/DL (ref 0.1–2)
BUN BLD-MCNC: 12 MG/DL (ref 8–20)
CALCIUM BLD-MCNC: 9.3 MG/DL (ref 8.3–10.3)
CHLORIDE: 106 MMOL/L (ref 101–111)
CO2: 30 MMOL/L (ref 22–32)
CREAT BLD-MCNC: 0.85 MG/DL (ref 0.55–1.02)
D-DIMER: 0.33 UG/ML FEU (ref 0–0.49)
EOSINOPHIL # BLD AUTO: 0.13 X10(3) UL (ref 0–0.3)
EOSINOPHIL NFR BLD AUTO: 1.8 %
ERYTHROCYTE [DISTWIDTH] IN BLOOD BY AUTOMATED COUNT: 12.4 % (ref 11.5–16)
GLUCOSE BLD-MCNC: 102 MG/DL (ref 70–99)
HCT VFR BLD AUTO: 42.1 % (ref 34–50)
HGB BLD-MCNC: 14.1 G/DL (ref 12–16)
IMMATURE GRANULOCYTE COUNT: 0.02 X10(3) UL (ref 0–1)
IMMATURE GRANULOCYTE RATIO %: 0.3 %
LYMPHOCYTES # BLD AUTO: 2.67 X10(3) UL (ref 0.9–4)
LYMPHOCYTES NFR BLD AUTO: 36.4 %
M PROTEIN MFR SERPL ELPH: 7.3 G/DL (ref 6.1–8.3)
MCH RBC QN AUTO: 30.9 PG (ref 27–33.2)
MCHC RBC AUTO-ENTMCNC: 33.5 G/DL (ref 31–37)
MCV RBC AUTO: 92.1 FL (ref 81–100)
MONOCYTES # BLD AUTO: 0.57 X10(3) UL (ref 0.1–1)
MONOCYTES NFR BLD AUTO: 7.8 %
NEUTROPHIL ABS PRELIM: 3.88 X10 (3) UL (ref 1.3–6.7)
NEUTROPHILS # BLD AUTO: 3.88 X10(3) UL (ref 1.3–6.7)
NEUTROPHILS NFR BLD AUTO: 52.9 %
PLATELET # BLD AUTO: 232 10(3)UL (ref 150–450)
POTASSIUM SERPL-SCNC: 3.4 MMOL/L (ref 3.6–5.1)
RBC # BLD AUTO: 4.57 X10(6)UL (ref 3.8–5.1)
RED CELL DISTRIBUTION WIDTH-SD: 41.6 FL (ref 35.1–46.3)
SODIUM SERPL-SCNC: 141 MMOL/L (ref 136–144)
TROPONIN: <0.046 NG/ML (ref ?–0.05)
WBC # BLD AUTO: 7.3 X10(3) UL (ref 4–13)

## 2018-07-14 PROCEDURE — 84484 ASSAY OF TROPONIN QUANT: CPT | Performed by: EMERGENCY MEDICINE

## 2018-07-14 PROCEDURE — 99285 EMERGENCY DEPT VISIT HI MDM: CPT

## 2018-07-14 PROCEDURE — 80053 COMPREHEN METABOLIC PANEL: CPT | Performed by: EMERGENCY MEDICINE

## 2018-07-14 PROCEDURE — 93005 ELECTROCARDIOGRAM TRACING: CPT

## 2018-07-14 PROCEDURE — 36415 COLL VENOUS BLD VENIPUNCTURE: CPT

## 2018-07-14 PROCEDURE — 85378 FIBRIN DEGRADE SEMIQUANT: CPT | Performed by: EMERGENCY MEDICINE

## 2018-07-14 PROCEDURE — 71046 X-RAY EXAM CHEST 2 VIEWS: CPT | Performed by: EMERGENCY MEDICINE

## 2018-07-14 PROCEDURE — 93010 ELECTROCARDIOGRAM REPORT: CPT

## 2018-07-14 PROCEDURE — 85025 COMPLETE CBC W/AUTO DIFF WBC: CPT | Performed by: EMERGENCY MEDICINE

## 2018-07-14 RX ORDER — MAGNESIUM HYDROXIDE/ALUMINUM HYDROXICE/SIMETHICONE 120; 1200; 1200 MG/30ML; MG/30ML; MG/30ML
30 SUSPENSION ORAL ONCE
Status: COMPLETED | OUTPATIENT
Start: 2018-07-14 | End: 2018-07-14

## 2018-07-14 RX ORDER — ASPIRIN 81 MG/1
81 TABLET, CHEWABLE ORAL DAILY
COMMUNITY

## 2018-07-15 LAB
ATRIAL RATE: 84 BPM
P AXIS: 63 DEGREES
P-R INTERVAL: 126 MS
Q-T INTERVAL: 376 MS
QRS DURATION: 86 MS
QTC CALCULATION (BEZET): 444 MS
R AXIS: 34 DEGREES
T AXIS: 19 DEGREES
VENTRICULAR RATE: 84 BPM

## 2018-07-15 NOTE — ED PROVIDER NOTES
Patient Seen in: [facility] Emergency Department In [place]    History   Patient presents with:  Chest Pain Angina (cardiovascular)    Stated Complaint: chest pain    HPI    It is a 43-year-old woman who presents with multiple complaints.   Symptoms starte mucous membranes. No meningismus. No adenopathy  Lungs: No tachypnea. Lungs clear to auscultation bilaterally without rales/rhonchi. Equal breath sounds bilaterally  Cardiac: No tachycardia. No murmurs. Regular rate and rhythm.   Abdomen: Soft and non LAVENDER   RAINBOW DRAW LIGHT GREEN   RAINBOW DRAW GOLD   CBC W/ DIFFERENTIAL     EKG    Rate, intervals and axes as noted on EKG Report. Rate: 84  Rhythm: Sinus Rhythm  Reading: Sinus rhythm without acute ST-T wave abnormality. Axis/intervals noted.   Ot

## 2018-08-06 ENCOUNTER — HOSPITAL ENCOUNTER (OUTPATIENT)
Dept: ULTRASOUND IMAGING | Age: 48
Discharge: HOME | End: 2018-08-06
Attending: OBSTETRICS & GYNECOLOGY
Payer: COMMERCIAL

## 2018-08-06 DIAGNOSIS — N83.201 RIGHT OVARIAN CYST: ICD-10-CM

## 2018-08-06 DIAGNOSIS — N84.0 ENDOMETRIAL POLYP: ICD-10-CM

## 2018-08-06 PROCEDURE — 76830 TRANSVAGINAL US NON-OB: CPT | Performed by: OBSTETRICS & GYNECOLOGY

## 2018-08-06 PROCEDURE — 76856 US EXAM PELVIC COMPLETE: CPT | Performed by: OBSTETRICS & GYNECOLOGY

## 2018-08-09 NOTE — ED NOTES
Call placed to pt. Notified pt of TSH being slightly low. Advised to follow up with PCP within this week. Pt agreeable, no further questions. States that she hasn't slept since last Friday and her headache has been just terrible.     Would prefer to try increasing the Belsomra first because she knows that works for her.      She feels that if she gets some good sleep to rest her body she will be better able to handle the stress and anxiety.

## 2018-08-27 ENCOUNTER — HOSPITAL ENCOUNTER (EMERGENCY)
Facility: HOSPITAL | Age: 48
Discharge: HOME | End: 2018-08-27
Attending: EMERGENCY MEDICINE
Payer: OTHER MISCELLANEOUS

## 2018-08-27 ENCOUNTER — APPOINTMENT (OUTPATIENT)
Dept: GENERAL RADIOLOGY | Facility: HOSPITAL | Age: 48
End: 2018-08-27
Attending: NURSE PRACTITIONER
Payer: OTHER MISCELLANEOUS

## 2018-08-27 VITALS
BODY MASS INDEX: 28.35 KG/M2 | SYSTOLIC BLOOD PRESSURE: 115 MMHG | TEMPERATURE: 98 F | RESPIRATION RATE: 14 BRPM | WEIGHT: 160 LBS | HEIGHT: 63 IN | HEART RATE: 72 BPM | DIASTOLIC BLOOD PRESSURE: 67 MMHG | OXYGEN SATURATION: 100 %

## 2018-08-27 DIAGNOSIS — S39.92XA INJURY OF BACK, INITIAL ENCOUNTER: Primary | ICD-10-CM

## 2018-08-27 PROCEDURE — 72110 X-RAY EXAM L-2 SPINE 4/>VWS: CPT | Performed by: NURSE PRACTITIONER

## 2018-08-27 PROCEDURE — 99283 EMERGENCY DEPT VISIT LOW MDM: CPT

## 2018-08-27 RX ORDER — CYCLOBENZAPRINE HCL 10 MG
10 TABLET ORAL 3 TIMES DAILY PRN
Qty: 20 TABLET | Refills: 0 | Status: SHIPPED | OUTPATIENT
Start: 2018-08-27 | End: 2018-09-03

## 2018-08-27 RX ORDER — METHYLPREDNISOLONE 4 MG/1
TABLET ORAL
Qty: 21 TABLET | Refills: 0 | Status: SHIPPED | OUTPATIENT
Start: 2018-08-27 | End: 2019-02-22 | Stop reason: ALTCHOICE

## 2018-08-28 NOTE — ED PROVIDER NOTES
I reviewed that chart and discussed the case with the physician assistant. I have examined the patient and noted patient appears to have a back strain. Patient will be discharged home. .    I agree with the physician assistant assessment and diagnosis  I

## 2018-08-28 NOTE — ED PROVIDER NOTES
Patient Seen in: [facility] Emergency Department    History   Patient presents with:  Back Pain (musculoskeletal)    Stated Complaint: low back pain, pulled by patient    49-year-old female presents today with complaints of lower back pain.   States °C)  Temp src: Temporal  SpO2: 100 %  O2 Device: None (Room air)    Current:/67   Pulse 72   Temp 98.2 °F (36.8 °C) (Temporal)   Resp 14   Ht 160 cm (5' 3\")   Wt 72.6 kg   SpO2 100%   BMI 28.34 kg/m²         Physical Exam   Constitutional: She is or on 8/27/2018 at 20:44     Approved by: Radha Hines MD              [facility]     Patient presented today with lower back pain after trying to help with patient lost her balance. X-ray showed no acute findings. Will give prescription for Flexeril.   Patient e

## 2018-08-28 NOTE — ED INITIAL ASSESSMENT (HPI)
Pt c/o LBP, states yesterday was pulled by a patient at work, states pain radiates to left leg. Pt denies paresthesias or incontinence.

## 2018-08-30 ENCOUNTER — OFFICE VISIT (OUTPATIENT)
Dept: OTHER | Facility: HOSPITAL | Age: 48
End: 2018-08-30
Attending: PREVENTIVE MEDICINE
Payer: OTHER MISCELLANEOUS

## 2018-08-30 DIAGNOSIS — S39.012A LUMBAR STRAIN: Primary | ICD-10-CM

## 2018-08-30 RX ORDER — IBUPROFEN 400 MG/1
TABLET ORAL
Qty: 30 TABLET | Refills: 0 | Status: SHIPPED | OUTPATIENT
Start: 2018-08-30 | End: 2019-02-22

## 2018-09-06 ENCOUNTER — APPOINTMENT (OUTPATIENT)
Dept: OTHER | Facility: HOSPITAL | Age: 48
End: 2018-09-06
Attending: PREVENTIVE MEDICINE
Payer: OTHER MISCELLANEOUS

## 2018-09-21 ENCOUNTER — PRIOR ORIGINAL RECORDS (OUTPATIENT)
Dept: OTHER | Age: 48
End: 2018-09-21

## 2018-09-25 ENCOUNTER — HOSPITAL ENCOUNTER (OUTPATIENT)
Dept: CV DIAGNOSTICS | Facility: HOSPITAL | Age: 48
Discharge: HOME | End: 2018-09-25
Attending: INTERNAL MEDICINE
Payer: COMMERCIAL

## 2018-09-25 DIAGNOSIS — I48.20 ATRIAL FIBRILLATION, CHRONIC (HCC): ICD-10-CM

## 2018-09-25 DIAGNOSIS — R07.2 CHEST PAIN, PRECORDIAL: ICD-10-CM

## 2018-09-25 DIAGNOSIS — R00.2 HEART PALPITATIONS: ICD-10-CM

## 2018-09-25 PROCEDURE — 93226 XTRNL ECG REC<48 HR SCAN A/R: CPT | Performed by: INTERNAL MEDICINE

## 2018-09-25 PROCEDURE — 93227 XTRNL ECG REC<48 HR R&I: CPT | Performed by: INTERNAL MEDICINE

## 2018-09-25 PROCEDURE — 93225 XTRNL ECG REC<48 HRS REC: CPT | Performed by: INTERNAL MEDICINE

## 2018-09-26 ENCOUNTER — PRIOR ORIGINAL RECORDS (OUTPATIENT)
Dept: OTHER | Age: 48
End: 2018-09-26

## 2018-10-03 ENCOUNTER — HOSPITAL ENCOUNTER (EMERGENCY)
Facility: HOSPITAL | Age: 48
Discharge: HOME | End: 2018-10-03
Attending: EMERGENCY MEDICINE
Payer: COMMERCIAL

## 2018-10-03 ENCOUNTER — APPOINTMENT (OUTPATIENT)
Dept: CT IMAGING | Facility: HOSPITAL | Age: 48
End: 2018-10-03
Attending: EMERGENCY MEDICINE
Payer: COMMERCIAL

## 2018-10-03 ENCOUNTER — APPOINTMENT (OUTPATIENT)
Dept: GENERAL RADIOLOGY | Facility: HOSPITAL | Age: 48
End: 2018-10-03
Attending: EMERGENCY MEDICINE
Payer: COMMERCIAL

## 2018-10-03 ENCOUNTER — PRIOR ORIGINAL RECORDS (OUTPATIENT)
Dept: OTHER | Age: 48
End: 2018-10-03

## 2018-10-03 VITALS
SYSTOLIC BLOOD PRESSURE: 113 MMHG | DIASTOLIC BLOOD PRESSURE: 72 MMHG | OXYGEN SATURATION: 99 % | HEART RATE: 73 BPM | BODY MASS INDEX: 28.35 KG/M2 | WEIGHT: 160 LBS | HEIGHT: 63 IN | RESPIRATION RATE: 20 BRPM | TEMPERATURE: 98 F

## 2018-10-03 DIAGNOSIS — R55 SYNCOPE, NEAR: Primary | ICD-10-CM

## 2018-10-03 PROCEDURE — 93005 ELECTROCARDIOGRAM TRACING: CPT

## 2018-10-03 PROCEDURE — 99285 EMERGENCY DEPT VISIT HI MDM: CPT

## 2018-10-03 PROCEDURE — 84443 ASSAY THYROID STIM HORMONE: CPT | Performed by: EMERGENCY MEDICINE

## 2018-10-03 PROCEDURE — 85025 COMPLETE CBC W/AUTO DIFF WBC: CPT | Performed by: EMERGENCY MEDICINE

## 2018-10-03 PROCEDURE — 93010 ELECTROCARDIOGRAM REPORT: CPT

## 2018-10-03 PROCEDURE — 82962 GLUCOSE BLOOD TEST: CPT

## 2018-10-03 PROCEDURE — 71045 X-RAY EXAM CHEST 1 VIEW: CPT | Performed by: EMERGENCY MEDICINE

## 2018-10-03 PROCEDURE — 84484 ASSAY OF TROPONIN QUANT: CPT | Performed by: EMERGENCY MEDICINE

## 2018-10-03 PROCEDURE — 36415 COLL VENOUS BLD VENIPUNCTURE: CPT

## 2018-10-03 PROCEDURE — 70450 CT HEAD/BRAIN W/O DYE: CPT | Performed by: EMERGENCY MEDICINE

## 2018-10-03 PROCEDURE — 85378 FIBRIN DEGRADE SEMIQUANT: CPT | Performed by: EMERGENCY MEDICINE

## 2018-10-03 PROCEDURE — 80053 COMPREHEN METABOLIC PANEL: CPT | Performed by: EMERGENCY MEDICINE

## 2018-10-03 NOTE — PROGRESS NOTES
10/03/18 1100   Clinical Encounter Type   Visited With Patient   Crisis Visit (Code Blue transfer from [place] 3rd Floor. )   Patient's Supportive Strategies/Resources Patient finds community support at [facility].     Confucianism En

## 2018-10-03 NOTE — ED PROVIDER NOTES
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 Constitutional and vital signs reviewed. All other systems reviewed and negative except as noted above.     Physical Exam     ED Triage Vitals [10/03/18 0956]   BP (!) 143/100   Pulse 83   Resp 17   Temp 98.1 ug/mL (FEU). Proceed with caution from clinical presentation. POCT GLUCOSE - Normal   CBC WITH DIFFERENTIAL WITH PLATELET    Narrative: The following orders were created for panel order CBC WITH DIFFERENTIAL WITH PLATELET.   Procedure

## 2018-10-04 ENCOUNTER — PRIOR ORIGINAL RECORDS (OUTPATIENT)
Dept: OTHER | Age: 48
End: 2018-10-04

## 2018-10-09 ENCOUNTER — PRIOR ORIGINAL RECORDS (OUTPATIENT)
Dept: OTHER | Age: 48
End: 2018-10-09

## 2018-10-11 LAB
ALBUMIN: 3.9 G/DL
ALKALINE PHOSPHATATE(ALK PHOS): 75 IU/L
BILIRUBIN TOTAL: 0.6 MG/DL
BUN: 12 MG/DL
CALCIUM: 8.9 MG/DL
CHLORIDE: 104 MEQ/L
CREATININE, SERUM: 0.68 MG/DL
GLOBULIN: 3.4 G/DL
GLUCOSE: 72 MG/DL
HEMATOCRIT: 43.6 %
HEMOGLOBIN: 15 G/DL
PLATELETS: 229 K/UL
POTASSIUM, SERUM: 4.1 MEQ/L
PROTEIN, TOTAL: 7.3 G/DL
RED BLOOD COUNT: 4.76 X 10-6/U
SGOT (AST): 15 IU/L
SGPT (ALT): 22 IU/L
SODIUM: 138 MEQ/L
THYROID STIMULATING HORMONE: 1.65 MLU/L
WHITE BLOOD COUNT: 5.5 X 10-3/U

## 2018-11-28 ENCOUNTER — MYAURORA ACCOUNT LINK (OUTPATIENT)
Dept: OTHER | Age: 48
End: 2018-11-28

## 2018-11-28 ENCOUNTER — PRIOR ORIGINAL RECORDS (OUTPATIENT)
Dept: OTHER | Age: 48
End: 2018-11-28

## 2018-12-06 NOTE — ED NOTES
PT AMBULATED WELL TO BATHROOM.  NOW IN ROOM, ON MONITOR SIDE RAILS UP, CALL LIGHT WITH IN REACH, FAMILY AT BEDSIDE, VS DONE Telephone Encounter by Rosalba Waite MD at 10/14/17 10:22 AM     Author:  Rosalba Waite MD Service:  (none) Author Type:  Physician     Filed:  10/14/17 10:27 AM Encounter Date:  10/14/2017 Status:  Signed     :  Rosalba Waite MD (Physician)            I spoke with Dr. Velasquez, [facility] [place] location. Yousuf has had a fever up to 101 this morning and a cough. Mother started Augmentin last night. Physical exam is unremarkable. Pox 99% on room air. CXR shows RML infiltrate. He got 2 gram Rocephin IV.     Yousuf will continue Augmentin. He will get 2 gm Rocephin IV on 10/15/17 at [facility].     Yousuf should see Dr. Aguirre on 10/16/17.[JO1.1M]    Electronically Signed by:    Rosalba Waite MD , 10/14/2017[JO1.2T]        Revision History        User Key Date/Time User Provider Type Action    > JO1.2 10/14/17 10:27 AM Rosalba Waite MD Physician Sign     JO1.1 10/14/17 10:22 AM Rosalba Waite MD Physician     M - Manual, T - Template

## 2018-12-28 ENCOUNTER — APPOINTMENT (OUTPATIENT)
Dept: GENERAL RADIOLOGY | Facility: HOSPITAL | Age: 48
End: 2018-12-28
Attending: EMERGENCY MEDICINE
Payer: COMMERCIAL

## 2018-12-28 ENCOUNTER — APPOINTMENT (OUTPATIENT)
Dept: CT IMAGING | Facility: HOSPITAL | Age: 48
End: 2018-12-28
Attending: EMERGENCY MEDICINE
Payer: COMMERCIAL

## 2018-12-28 ENCOUNTER — PRIOR ORIGINAL RECORDS (OUTPATIENT)
Dept: OTHER | Age: 48
End: 2018-12-28

## 2018-12-28 ENCOUNTER — HOSPITAL ENCOUNTER (EMERGENCY)
Facility: HOSPITAL | Age: 48
Discharge: HOME | End: 2018-12-28
Attending: EMERGENCY MEDICINE
Payer: COMMERCIAL

## 2018-12-28 VITALS
HEART RATE: 75 BPM | DIASTOLIC BLOOD PRESSURE: 83 MMHG | OXYGEN SATURATION: 100 % | TEMPERATURE: 100 F | RESPIRATION RATE: 18 BRPM | SYSTOLIC BLOOD PRESSURE: 126 MMHG

## 2018-12-28 DIAGNOSIS — R00.2 PALPITATIONS: Primary | ICD-10-CM

## 2018-12-28 LAB
ALBUMIN SERPL-MCNC: 3.5 G/DL (ref 3.1–4.5)
ALBUMIN/GLOB SERPL: 1.1 {RATIO} (ref 1–2)
ALP LIVER SERPL-CCNC: 73 U/L (ref 39–100)
ALT SERPL-CCNC: 20 U/L (ref 14–54)
ANION GAP SERPL CALC-SCNC: 3 MMOL/L (ref 0–18)
AST SERPL-CCNC: 15 U/L (ref 15–41)
BASOPHILS # BLD AUTO: 0.05 X10(3) UL (ref 0–0.1)
BASOPHILS NFR BLD AUTO: 0.9 %
BILIRUB SERPL-MCNC: 0.2 MG/DL (ref 0.1–2)
BUN BLD-MCNC: 11 MG/DL (ref 8–20)
BUN/CREAT SERPL: 16.7 (ref 10–20)
CALCIUM BLD-MCNC: 8.6 MG/DL (ref 8.3–10.3)
CHLORIDE SERPL-SCNC: 107 MMOL/L (ref 101–111)
CO2 SERPL-SCNC: 31 MMOL/L (ref 22–32)
CREAT BLD-MCNC: 0.66 MG/DL (ref 0.55–1.02)
D-DIMER: 1.12 UG/ML FEU (ref 0–0.49)
EOSINOPHIL # BLD AUTO: 0.05 X10(3) UL (ref 0–0.3)
EOSINOPHIL NFR BLD AUTO: 0.9 %
ERYTHROCYTE [DISTWIDTH] IN BLOOD BY AUTOMATED COUNT: 11.9 % (ref 11.5–16)
GLOBULIN PLAS-MCNC: 3.2 G/DL (ref 2.8–4.4)
GLUCOSE BLD-MCNC: 95 MG/DL (ref 70–99)
HCT VFR BLD AUTO: 40.9 % (ref 34–50)
HGB BLD-MCNC: 14.3 G/DL (ref 12–16)
IMMATURE GRANULOCYTE COUNT: 0.01 X10(3) UL (ref 0–1)
IMMATURE GRANULOCYTE RATIO %: 0.2 %
LYMPHOCYTES # BLD AUTO: 1.86 X10(3) UL (ref 0.9–4)
LYMPHOCYTES NFR BLD AUTO: 32.3 %
M PROTEIN MFR SERPL ELPH: 6.7 G/DL (ref 6.4–8.2)
MCH RBC QN AUTO: 31.5 PG (ref 27–33.2)
MCHC RBC AUTO-ENTMCNC: 35 G/DL (ref 31–37)
MCV RBC AUTO: 90.1 FL (ref 81–100)
MONOCYTES # BLD AUTO: 0.46 X10(3) UL (ref 0.1–1)
MONOCYTES NFR BLD AUTO: 8 %
NEUTROPHIL ABS PRELIM: 3.33 X10 (3) UL (ref 1.3–6.7)
NEUTROPHILS # BLD AUTO: 3.33 X10(3) UL (ref 1.3–6.7)
NEUTROPHILS NFR BLD AUTO: 57.7 %
OSMOLALITY SERPL CALC.SUM OF ELEC: 291 MOSM/KG (ref 275–295)
PLATELET # BLD AUTO: 210 10(3)UL (ref 150–450)
POTASSIUM SERPL-SCNC: 3.8 MMOL/L (ref 3.6–5.1)
RBC # BLD AUTO: 4.54 X10(6)UL (ref 3.8–5.1)
RED CELL DISTRIBUTION WIDTH-SD: 38.9 FL (ref 35.1–46.3)
SODIUM SERPL-SCNC: 141 MMOL/L (ref 136–144)
TROPONIN I SERPL-MCNC: <0.046 NG/ML (ref ?–0.05)
WBC # BLD AUTO: 5.8 X10(3) UL (ref 4–13)

## 2018-12-28 PROCEDURE — 71275 CT ANGIOGRAPHY CHEST: CPT | Performed by: EMERGENCY MEDICINE

## 2018-12-28 PROCEDURE — 84484 ASSAY OF TROPONIN QUANT: CPT | Performed by: EMERGENCY MEDICINE

## 2018-12-28 PROCEDURE — 36415 COLL VENOUS BLD VENIPUNCTURE: CPT

## 2018-12-28 PROCEDURE — 99285 EMERGENCY DEPT VISIT HI MDM: CPT

## 2018-12-28 PROCEDURE — 85378 FIBRIN DEGRADE SEMIQUANT: CPT | Performed by: EMERGENCY MEDICINE

## 2018-12-28 PROCEDURE — 93010 ELECTROCARDIOGRAM REPORT: CPT

## 2018-12-28 PROCEDURE — 85025 COMPLETE CBC W/AUTO DIFF WBC: CPT | Performed by: EMERGENCY MEDICINE

## 2018-12-28 PROCEDURE — 71045 X-RAY EXAM CHEST 1 VIEW: CPT | Performed by: EMERGENCY MEDICINE

## 2018-12-28 PROCEDURE — 80053 COMPREHEN METABOLIC PANEL: CPT | Performed by: EMERGENCY MEDICINE

## 2018-12-28 PROCEDURE — 93005 ELECTROCARDIOGRAM TRACING: CPT

## 2018-12-29 LAB
ATRIAL RATE: 81 BPM
P AXIS: 73 DEGREES
P-R INTERVAL: 130 MS
Q-T INTERVAL: 358 MS
QRS DURATION: 86 MS
QTC CALCULATION (BEZET): 415 MS
R AXIS: -3 DEGREES
T AXIS: 24 DEGREES
VENTRICULAR RATE: 81 BPM

## 2018-12-29 NOTE — ED PROVIDER NOTES
Patient Seen in: [facility] Emergency Department    History   Patient presents with:  Arrythmia/Palpitations (cardiovascular)    Stated Complaint:     HPI    27-year-old white female with a history of recurrent A. fib status post cardiac ablation pr 1738 81   Resp 12/28/18 1738 18   Temp 12/28/18 1738 99.8 °F (37.7 °C)   Temp src 12/28/18 1738 Oral   SpO2 12/28/18 1738 99 %   O2 Device 12/28/18 1824 None (Room air)       Current:/83   Pulse 75   Temp 99.8 °F (37.7 °C) (Oral)   Resp 18   SpO2 100 presentation. COMP METABOLIC PANEL (14) - Normal   TROPONIN I - Normal   CBC WITH DIFFERENTIAL WITH PLATELET    Narrative: The following orders were created for panel order CBC WITH DIFFERENTIAL WITH PLATELET.   Procedure slightly elevated d-dimer. CT scan the chest showed no PE. Patient was seen by her cardiologist here when she first arrived. Dr Allen Green felt that she could go home if she remained in sinus rhythm here and had negative labs.   Patient remained in sinus rhythm

## 2019-01-01 ENCOUNTER — EXTERNAL RECORD (OUTPATIENT)
Dept: CARDIOLOGY | Age: 49
End: 2019-01-01

## 2019-01-04 ENCOUNTER — HOSPITAL ENCOUNTER (OUTPATIENT)
Dept: CV DIAGNOSTICS | Facility: HOSPITAL | Age: 49
Discharge: HOME | End: 2019-01-04
Attending: INTERNAL MEDICINE
Payer: COMMERCIAL

## 2019-01-04 DIAGNOSIS — R00.2 PALPITATION: ICD-10-CM

## 2019-01-04 DIAGNOSIS — I48.20 ATRIAL FIBRILLATION, CHRONIC (HCC): ICD-10-CM

## 2019-01-04 PROCEDURE — 93271 ECG/MONITORING AND ANALYSIS: CPT | Performed by: INTERNAL MEDICINE

## 2019-01-04 PROCEDURE — 93272 ECG/REVIEW INTERPRET ONLY: CPT | Performed by: INTERNAL MEDICINE

## 2019-01-04 PROCEDURE — 93270 REMOTE 30 DAY ECG REV/REPORT: CPT | Performed by: INTERNAL MEDICINE

## 2019-01-21 ENCOUNTER — PRIOR ORIGINAL RECORDS (OUTPATIENT)
Dept: OTHER | Age: 49
End: 2019-01-21

## 2019-02-19 ENCOUNTER — PRIOR ORIGINAL RECORDS (OUTPATIENT)
Dept: OTHER | Age: 49
End: 2019-02-19

## 2019-02-22 ENCOUNTER — OFFICE VISIT (OUTPATIENT)
Dept: INTERNAL MEDICINE CLINIC | Facility: CLINIC | Age: 49
End: 2019-02-22
Payer: COMMERCIAL

## 2019-02-22 VITALS
SYSTOLIC BLOOD PRESSURE: 124 MMHG | WEIGHT: 174.5 LBS | BODY MASS INDEX: 29.07 KG/M2 | DIASTOLIC BLOOD PRESSURE: 86 MMHG | OXYGEN SATURATION: 98 % | HEART RATE: 82 BPM | HEIGHT: 65 IN | TEMPERATURE: 98 F | RESPIRATION RATE: 16 BRPM

## 2019-02-22 DIAGNOSIS — J01.00 ACUTE NON-RECURRENT MAXILLARY SINUSITIS: Primary | ICD-10-CM

## 2019-02-22 PROCEDURE — 99213 OFFICE O/P EST LOW 20 MIN: CPT | Performed by: NURSE PRACTITIONER

## 2019-02-22 RX ORDER — METHYLPREDNISOLONE 4 MG/1
TABLET ORAL
Qty: 1 KIT | Refills: 0 | Status: SHIPPED | OUTPATIENT
Start: 2019-02-22 | End: 2019-07-30

## 2019-02-22 NOTE — PROGRESS NOTES
CHIEF COMPLAINT:   Patient presents with:  URI: started the 8th of this month, was in [address] [place] [address] on Wednesday this week got put on an antibotic doxycline 100 Mg, twice daily. Strep test was negative. Sinus pressure. Little cough, Post nasal drip.  Chest tightness/ Past Surgical History:   Procedure Laterality Date   •       x2   • COLONOSCOPY           Social History    Tobacco Use      Smoking status: Never Smoker      Smokeless tobacco: Never Used    Alcohol use: No      Alcohol/week: 0.0 oz    Drug use night to help with the coughing. The patient indicates understanding of these issues and agrees to the plan. The patient is asked to return if sx's persist or worsen with completion of antibiotics.

## 2019-02-25 ENCOUNTER — TELEPHONE (OUTPATIENT)
Dept: INTERNAL MEDICINE CLINIC | Facility: CLINIC | Age: 49
End: 2019-02-25

## 2019-02-25 RX ORDER — AMOXICILLIN AND CLAVULANATE POTASSIUM 875; 125 MG/1; MG/1
1 TABLET, FILM COATED ORAL 2 TIMES DAILY
Qty: 20 TABLET | Refills: 0 | Status: SHIPPED | OUTPATIENT
Start: 2019-02-25 | End: 2019-03-07

## 2019-02-25 RX ORDER — METHYLPREDNISOLONE 4 MG/1
TABLET ORAL
Qty: 1 KIT | Refills: 0 | Status: SHIPPED | OUTPATIENT
Start: 2019-02-25 | End: 2019-02-25

## 2019-02-25 RX ORDER — BENZONATATE 100 MG/1
100 CAPSULE ORAL 3 TIMES DAILY PRN
Qty: 30 CAPSULE | Refills: 0 | Status: SHIPPED | OUTPATIENT
Start: 2019-02-25 | End: 2019-07-30

## 2019-02-25 RX ORDER — PREDNISONE 10 MG/1
TABLET ORAL
Qty: 20 TABLET | Refills: 0 | Status: SHIPPED | OUTPATIENT
Start: 2019-02-25 | End: 2019-07-30

## 2019-02-25 NOTE — TELEPHONE ENCOUNTER
Pt to stop doxycycline, Augmentin sent. Also refilled tessalon and sent medrol dose pack. Pt should take probiotics as well. Recommend culturelle.

## 2019-02-25 NOTE — TELEPHONE ENCOUNTER
Pt calling back since we did not reach back out to her ( This TE was closed and not send back to triage ) Asking if she is to do another medrol dose pack?  Still has 2 days left on her medrol dose pack she is currently taking

## 2019-02-25 NOTE — TELEPHONE ENCOUNTER
Patient saw Gerald Taylor, needs more cough medicine and asking for stronger/different antibiotic please callback to triage

## 2019-02-25 NOTE — TELEPHONE ENCOUNTER
Pt feels worse than when she was seen in office still taking abx ,Requesting stronger abx for Sinus pressure ,head congestion , Unable to breathe thru nose or able to clear it ,Pain is 8 out of 10 in her sinuses.  Pt states her cough is controlled with jose angel

## 2019-02-27 ENCOUNTER — PRIOR ORIGINAL RECORDS (OUTPATIENT)
Dept: OTHER | Age: 49
End: 2019-02-27

## 2019-02-28 VITALS
HEIGHT: 65 IN | HEART RATE: 66 BPM | DIASTOLIC BLOOD PRESSURE: 62 MMHG | BODY MASS INDEX: 30.16 KG/M2 | WEIGHT: 181 LBS | SYSTOLIC BLOOD PRESSURE: 104 MMHG

## 2019-02-28 VITALS
DIASTOLIC BLOOD PRESSURE: 64 MMHG | BODY MASS INDEX: 27.66 KG/M2 | HEIGHT: 65 IN | HEART RATE: 74 BPM | WEIGHT: 166 LBS | SYSTOLIC BLOOD PRESSURE: 110 MMHG

## 2019-02-28 VITALS
BODY MASS INDEX: 26.66 KG/M2 | WEIGHT: 160 LBS | DIASTOLIC BLOOD PRESSURE: 72 MMHG | HEART RATE: 88 BPM | SYSTOLIC BLOOD PRESSURE: 94 MMHG | HEIGHT: 65 IN

## 2019-02-28 VITALS
SYSTOLIC BLOOD PRESSURE: 98 MMHG | HEIGHT: 65 IN | DIASTOLIC BLOOD PRESSURE: 60 MMHG | BODY MASS INDEX: 30.16 KG/M2 | WEIGHT: 181 LBS | HEART RATE: 66 BPM

## 2019-02-28 VITALS
WEIGHT: 177 LBS | HEIGHT: 65 IN | BODY MASS INDEX: 29.49 KG/M2 | SYSTOLIC BLOOD PRESSURE: 112 MMHG | HEART RATE: 63 BPM | DIASTOLIC BLOOD PRESSURE: 72 MMHG

## 2019-02-28 VITALS
HEIGHT: 65 IN | DIASTOLIC BLOOD PRESSURE: 66 MMHG | BODY MASS INDEX: 28.49 KG/M2 | SYSTOLIC BLOOD PRESSURE: 108 MMHG | WEIGHT: 171 LBS | HEART RATE: 70 BPM

## 2019-02-28 VITALS
BODY MASS INDEX: 29.66 KG/M2 | DIASTOLIC BLOOD PRESSURE: 64 MMHG | HEART RATE: 76 BPM | WEIGHT: 178 LBS | HEIGHT: 65 IN | SYSTOLIC BLOOD PRESSURE: 112 MMHG

## 2019-02-28 VITALS
BODY MASS INDEX: 30.49 KG/M2 | SYSTOLIC BLOOD PRESSURE: 128 MMHG | HEIGHT: 65 IN | HEART RATE: 78 BPM | WEIGHT: 183 LBS | DIASTOLIC BLOOD PRESSURE: 62 MMHG

## 2019-03-27 RX ORDER — MAGNESIUM OXIDE 400 MG/1
1 TABLET ORAL DAILY
COMMUNITY
Start: 2016-05-02

## 2019-03-27 RX ORDER — PANTOPRAZOLE SODIUM 20 MG/1
1 TABLET, DELAYED RELEASE ORAL DAILY
COMMUNITY
Start: 2018-09-26 | End: 2019-05-17 | Stop reason: SDUPTHER

## 2019-03-27 RX ORDER — FLECAINIDE ACETATE 50 MG/1
1 TABLET ORAL 2 TIMES DAILY
COMMUNITY
Start: 2018-11-28 | End: 2020-02-21 | Stop reason: SDUPTHER

## 2019-05-15 ENCOUNTER — OFFICE VISIT (OUTPATIENT)
Dept: CARDIOLOGY | Age: 49
End: 2019-05-15

## 2019-05-15 VITALS
WEIGHT: 177 LBS | DIASTOLIC BLOOD PRESSURE: 78 MMHG | HEIGHT: 63 IN | SYSTOLIC BLOOD PRESSURE: 102 MMHG | HEART RATE: 70 BPM | BODY MASS INDEX: 31.36 KG/M2

## 2019-05-15 DIAGNOSIS — Z51.81 ENCOUNTER FOR MONITORING FLECAINIDE THERAPY: ICD-10-CM

## 2019-05-15 DIAGNOSIS — Z79.899 ENCOUNTER FOR MONITORING FLECAINIDE THERAPY: ICD-10-CM

## 2019-05-15 DIAGNOSIS — Z86.79 HISTORY OF ATRIAL FIBRILLATION: Primary | ICD-10-CM

## 2019-05-15 PROCEDURE — 99213 OFFICE O/P EST LOW 20 MIN: CPT | Performed by: INTERNAL MEDICINE

## 2019-05-15 SDOH — HEALTH STABILITY: PHYSICAL HEALTH: ON AVERAGE, HOW MANY MINUTES DO YOU ENGAGE IN EXERCISE AT THIS LEVEL?: 60 MIN

## 2019-05-15 SDOH — HEALTH STABILITY: PHYSICAL HEALTH: ON AVERAGE, HOW MANY DAYS PER WEEK DO YOU ENGAGE IN MODERATE TO STRENUOUS EXERCISE (LIKE A BRISK WALK)?: 4 DAYS

## 2019-05-20 RX ORDER — PANTOPRAZOLE SODIUM 20 MG/1
TABLET, DELAYED RELEASE ORAL
Qty: 90 TABLET | Refills: 0 | Status: SHIPPED | OUTPATIENT
Start: 2019-05-20 | End: 2019-08-10 | Stop reason: SDUPTHER

## 2019-07-30 ENCOUNTER — APPOINTMENT (OUTPATIENT)
Dept: GENERAL RADIOLOGY | Facility: HOSPITAL | Age: 49
End: 2019-07-30
Payer: COMMERCIAL

## 2019-07-30 ENCOUNTER — HOSPITAL ENCOUNTER (EMERGENCY)
Facility: HOSPITAL | Age: 49
Discharge: HOME | End: 2019-07-30
Attending: EMERGENCY MEDICINE
Payer: COMMERCIAL

## 2019-07-30 ENCOUNTER — APPOINTMENT (OUTPATIENT)
Dept: CT IMAGING | Facility: HOSPITAL | Age: 49
End: 2019-07-30
Attending: EMERGENCY MEDICINE
Payer: COMMERCIAL

## 2019-07-30 ENCOUNTER — TELEPHONE (OUTPATIENT)
Dept: CARDIOLOGY | Age: 49
End: 2019-07-30

## 2019-07-30 VITALS
HEART RATE: 72 BPM | RESPIRATION RATE: 15 BRPM | TEMPERATURE: 99 F | WEIGHT: 168 LBS | BODY MASS INDEX: 29.77 KG/M2 | HEIGHT: 63 IN | OXYGEN SATURATION: 99 % | DIASTOLIC BLOOD PRESSURE: 81 MMHG | SYSTOLIC BLOOD PRESSURE: 131 MMHG

## 2019-07-30 DIAGNOSIS — I49.9 CARDIAC ARRHYTHMIA, UNSPECIFIED CARDIAC ARRHYTHMIA TYPE: ICD-10-CM

## 2019-07-30 DIAGNOSIS — R07.89 ATYPICAL CHEST PAIN: Primary | ICD-10-CM

## 2019-07-30 LAB
ALBUMIN SERPL-MCNC: 3.9 G/DL (ref 3.4–5)
ALBUMIN/GLOB SERPL: 1.1 {RATIO} (ref 1–2)
ALP LIVER SERPL-CCNC: 80 U/L (ref 39–100)
ALT SERPL-CCNC: 21 U/L (ref 13–56)
ANION GAP SERPL CALC-SCNC: 5 MMOL/L (ref 0–18)
AST SERPL-CCNC: 16 U/L (ref 15–37)
ATRIAL RATE: 89 BPM
BASOPHILS # BLD AUTO: 0.02 X10(3) UL (ref 0–0.2)
BASOPHILS NFR BLD AUTO: 0.3 %
BILIRUB SERPL-MCNC: 0.3 MG/DL (ref 0.1–2)
BUN BLD-MCNC: 15 MG/DL (ref 7–18)
BUN/CREAT SERPL: 15.6 (ref 10–20)
CALCIUM BLD-MCNC: 9.5 MG/DL (ref 8.5–10.1)
CHLORIDE SERPL-SCNC: 107 MMOL/L (ref 98–112)
CO2 SERPL-SCNC: 29 MMOL/L (ref 21–32)
CREAT BLD-MCNC: 0.96 MG/DL (ref 0.55–1.02)
D-DIMER: 1.21 UG/ML FEU (ref ?–0.5)
DEPRECATED RDW RBC AUTO: 39.7 FL (ref 35.1–46.3)
EOSINOPHIL # BLD AUTO: 0.07 X10(3) UL (ref 0–0.7)
EOSINOPHIL NFR BLD AUTO: 1.2 %
ERYTHROCYTE [DISTWIDTH] IN BLOOD BY AUTOMATED COUNT: 12.2 % (ref 11–15)
GLOBULIN PLAS-MCNC: 3.7 G/DL (ref 2.8–4.4)
GLUCOSE BLD-MCNC: 95 MG/DL (ref 70–99)
HCT VFR BLD AUTO: 44 % (ref 35–48)
HGB BLD-MCNC: 15.6 G/DL (ref 12–16)
IMM GRANULOCYTES # BLD AUTO: 0.01 X10(3) UL (ref 0–1)
IMM GRANULOCYTES NFR BLD: 0.2 %
LYMPHOCYTES # BLD AUTO: 1.71 X10(3) UL (ref 1–4)
LYMPHOCYTES NFR BLD AUTO: 29.3 %
M PROTEIN MFR SERPL ELPH: 7.6 G/DL (ref 6.4–8.2)
MCH RBC QN AUTO: 31.4 PG (ref 26–34)
MCHC RBC AUTO-ENTMCNC: 35.5 G/DL (ref 31–37)
MCV RBC AUTO: 88.5 FL (ref 80–100)
MONOCYTES # BLD AUTO: 0.43 X10(3) UL (ref 0.1–1)
MONOCYTES NFR BLD AUTO: 7.4 %
NEUTROPHILS # BLD AUTO: 3.6 X10 (3) UL (ref 1.5–7.7)
NEUTROPHILS # BLD AUTO: 3.6 X10(3) UL (ref 1.5–7.7)
NEUTROPHILS NFR BLD AUTO: 61.6 %
OSMOLALITY SERPL CALC.SUM OF ELEC: 293 MOSM/KG (ref 275–295)
P AXIS: 62 DEGREES
P-R INTERVAL: 136 MS
PLATELET # BLD AUTO: 231 10(3)UL (ref 150–450)
POTASSIUM SERPL-SCNC: 3.7 MMOL/L (ref 3.5–5.1)
Q-T INTERVAL: 368 MS
QRS DURATION: 92 MS
QTC CALCULATION (BEZET): 447 MS
R AXIS: 25 DEGREES
RBC # BLD AUTO: 4.97 X10(6)UL (ref 3.8–5.3)
SODIUM SERPL-SCNC: 141 MMOL/L (ref 136–145)
T AXIS: 36 DEGREES
TROPONIN I SERPL-MCNC: <0.045 NG/ML (ref ?–0.04)
TROPONIN I SERPL-MCNC: <0.045 NG/ML (ref ?–0.04)
VENTRICULAR RATE: 89 BPM
WBC # BLD AUTO: 5.8 X10(3) UL (ref 4–11)

## 2019-07-30 PROCEDURE — 85025 COMPLETE CBC W/AUTO DIFF WBC: CPT | Performed by: EMERGENCY MEDICINE

## 2019-07-30 PROCEDURE — 93005 ELECTROCARDIOGRAM TRACING: CPT

## 2019-07-30 PROCEDURE — 84484 ASSAY OF TROPONIN QUANT: CPT

## 2019-07-30 PROCEDURE — 71045 X-RAY EXAM CHEST 1 VIEW: CPT

## 2019-07-30 PROCEDURE — 84484 ASSAY OF TROPONIN QUANT: CPT | Performed by: EMERGENCY MEDICINE

## 2019-07-30 PROCEDURE — 80053 COMPREHEN METABOLIC PANEL: CPT

## 2019-07-30 PROCEDURE — 99285 EMERGENCY DEPT VISIT HI MDM: CPT

## 2019-07-30 PROCEDURE — 71275 CT ANGIOGRAPHY CHEST: CPT | Performed by: EMERGENCY MEDICINE

## 2019-07-30 PROCEDURE — 96374 THER/PROPH/DIAG INJ IV PUSH: CPT

## 2019-07-30 PROCEDURE — 80053 COMPREHEN METABOLIC PANEL: CPT | Performed by: EMERGENCY MEDICINE

## 2019-07-30 PROCEDURE — 85025 COMPLETE CBC W/AUTO DIFF WBC: CPT

## 2019-07-30 PROCEDURE — 93010 ELECTROCARDIOGRAM REPORT: CPT

## 2019-07-30 PROCEDURE — 85378 FIBRIN DEGRADE SEMIQUANT: CPT | Performed by: EMERGENCY MEDICINE

## 2019-07-30 RX ORDER — KETOROLAC TROMETHAMINE 30 MG/ML
30 INJECTION, SOLUTION INTRAMUSCULAR; INTRAVENOUS ONCE
Status: COMPLETED | OUTPATIENT
Start: 2019-07-30 | End: 2019-07-30

## 2019-07-30 NOTE — ED PROVIDER NOTES
Patient Seen in: [facility] Emergency Department    History   Patient presents with:  Chest Pain Angina (cardiovascular)    Stated Complaint: chest pain, afib    HPI    40-year-old patient presents to the emergency department she states she has a hi Exam    Very pleasant slightly anxious appearing 51-year-old woman lying on emergency department bed, her HEENT exam is within normal limits and her neck is supple her lungs are clear to auscultation her heart has a regular rate and rhythm without murmurs DIFFERENTIAL     EKG    Rate, intervals and axes as noted on EKG Report.   Rate: 89Rhythm: Sinus Rhythm  Readin bpm, normal sinus rhythm, normal EKG no acute ST changes to suggest ischemia              CT ANGIOGRAPHY, CHEST (GDM=71261)   Final Result Gerald Alonso MD                 XR CHEST AP PORTABLE  (CPT=71045)   Final Result    PROCEDURE:  XR CHEST AP PORTABLE  (CPT=71045)         TECHNIQUE:  AP chest radiograph was obtained. COMPARISON:  None.          INDICATIONS:  chest pain, afib as soon as possible for a visit          Medications Prescribed:  Discharge Medication List as of 7/30/2019  5:00 PM

## 2019-07-30 NOTE — ED INITIAL ASSESSMENT (HPI)
Patient presents with chest discomfort that has been intermittent since yesterday. She reports she has a history of afib and felt like she went into that rhythm this morning and has been having chest pressure/burning more consistently since that episode.  P

## 2019-07-31 ENCOUNTER — TELEPHONE (OUTPATIENT)
Dept: CARDIOLOGY | Age: 49
End: 2019-07-31

## 2019-07-31 DIAGNOSIS — R07.9 CHEST PAIN, UNSPECIFIED TYPE: ICD-10-CM

## 2019-07-31 DIAGNOSIS — I48.0 PAROXYSMAL ATRIAL FIBRILLATION (CMD): Primary | ICD-10-CM

## 2019-07-31 DIAGNOSIS — R00.2 PALPITATIONS: ICD-10-CM

## 2019-08-01 ENCOUNTER — TELEPHONE (OUTPATIENT)
Dept: CARDIOLOGY | Age: 49
End: 2019-08-01

## 2019-08-12 RX ORDER — PANTOPRAZOLE SODIUM 20 MG/1
TABLET, DELAYED RELEASE ORAL
Qty: 90 TABLET | Refills: 0 | Status: SHIPPED | OUTPATIENT
Start: 2019-08-12 | End: 2019-11-22 | Stop reason: SDUPTHER

## 2019-08-14 ENCOUNTER — OFFICE VISIT (OUTPATIENT)
Dept: INTERNAL MEDICINE CLINIC | Facility: CLINIC | Age: 49
End: 2019-08-14
Payer: COMMERCIAL

## 2019-08-14 ENCOUNTER — APPOINTMENT (OUTPATIENT)
Dept: LAB | Age: 49
End: 2019-08-14
Attending: FAMILY MEDICINE
Payer: COMMERCIAL

## 2019-08-14 ENCOUNTER — TELEPHONE (OUTPATIENT)
Dept: INTERNAL MEDICINE CLINIC | Facility: CLINIC | Age: 49
End: 2019-08-14

## 2019-08-14 VITALS
TEMPERATURE: 98 F | HEART RATE: 60 BPM | RESPIRATION RATE: 16 BRPM | OXYGEN SATURATION: 98 % | DIASTOLIC BLOOD PRESSURE: 78 MMHG | BODY MASS INDEX: 30.16 KG/M2 | HEIGHT: 65 IN | WEIGHT: 181 LBS | SYSTOLIC BLOOD PRESSURE: 120 MMHG

## 2019-08-14 DIAGNOSIS — Z12.31 ENCOUNTER FOR SCREENING MAMMOGRAM FOR BREAST CANCER: ICD-10-CM

## 2019-08-14 DIAGNOSIS — M72.2 PLANTAR FASCIAL FIBROMATOSIS: ICD-10-CM

## 2019-08-14 DIAGNOSIS — K21.9 GASTROESOPHAGEAL REFLUX DISEASE WITHOUT ESOPHAGITIS: ICD-10-CM

## 2019-08-14 DIAGNOSIS — Z00.00 ROUTINE GENERAL MEDICAL EXAMINATION AT A HEALTH CARE FACILITY: ICD-10-CM

## 2019-08-14 DIAGNOSIS — Z00.00 ROUTINE GENERAL MEDICAL EXAMINATION AT A HEALTH CARE FACILITY: Primary | ICD-10-CM

## 2019-08-14 DIAGNOSIS — F41.0 GENERALIZED ANXIETY DISORDER WITH PANIC ATTACKS: ICD-10-CM

## 2019-08-14 DIAGNOSIS — E66.9 OBESITY, CLASS I, BMI 30-34.9: ICD-10-CM

## 2019-08-14 DIAGNOSIS — F41.1 GENERALIZED ANXIETY DISORDER WITH PANIC ATTACKS: ICD-10-CM

## 2019-08-14 DIAGNOSIS — I48.91 ATRIAL FIBRILLATION, UNSPECIFIED TYPE (HCC): ICD-10-CM

## 2019-08-14 LAB
CHOLEST SMN-MCNC: 151 MG/DL (ref ?–200)
EST. AVERAGE GLUCOSE BLD GHB EST-MCNC: 100 MG/DL (ref 68–126)
HBA1C MFR BLD HPLC: 5.1 % (ref ?–5.7)
HDLC SERPL-MCNC: 99 MG/DL (ref 40–59)
LDLC SERPL CALC-MCNC: 46 MG/DL (ref ?–100)
NONHDLC SERPL-MCNC: 52 MG/DL (ref ?–130)
TRIGL SERPL-MCNC: 32 MG/DL (ref 30–149)
TSI SER-ACNC: 0.67 MIU/ML (ref 0.36–3.74)
VIT D+METAB SERPL-MCNC: 34.1 NG/ML (ref 30–100)
VLDLC SERPL CALC-MCNC: 6 MG/DL (ref 0–30)

## 2019-08-14 PROCEDURE — 84443 ASSAY THYROID STIM HORMONE: CPT

## 2019-08-14 PROCEDURE — 80061 LIPID PANEL: CPT

## 2019-08-14 PROCEDURE — 82306 VITAMIN D 25 HYDROXY: CPT

## 2019-08-14 PROCEDURE — 99213 OFFICE O/P EST LOW 20 MIN: CPT | Performed by: FAMILY MEDICINE

## 2019-08-14 PROCEDURE — 99396 PREV VISIT EST AGE 40-64: CPT | Performed by: FAMILY MEDICINE

## 2019-08-14 PROCEDURE — 83036 HEMOGLOBIN GLYCOSYLATED A1C: CPT

## 2019-08-14 PROCEDURE — 36415 COLL VENOUS BLD VENIPUNCTURE: CPT

## 2019-08-14 RX ORDER — PAROXETINE 10 MG/1
10 TABLET, FILM COATED ORAL EVERY MORNING
Qty: 30 TABLET | Refills: 1 | Status: SHIPPED | OUTPATIENT
Start: 2019-08-14 | End: 2019-09-05

## 2019-08-14 RX ORDER — LORAZEPAM 0.5 MG/1
0.5 TABLET ORAL 2 TIMES DAILY PRN
Qty: 30 TABLET | Refills: 0 | Status: SHIPPED | OUTPATIENT
Start: 2019-08-14 | End: 2019-08-20

## 2019-08-14 RX ORDER — FLECAINIDE ACETATE 50 MG/1
1 TABLET ORAL 2 TIMES DAILY
COMMUNITY
Start: 2019-08-10

## 2019-08-14 RX ORDER — TRIAMCINOLONE ACETONIDE 0.25 MG/G
1 CREAM TOPICAL 3 TIMES DAILY
COMMUNITY
Start: 2019-08-10 | End: 2020-08-20 | Stop reason: ALTCHOICE

## 2019-08-14 RX ORDER — PANTOPRAZOLE SODIUM 20 MG/1
1 TABLET, DELAYED RELEASE ORAL DAILY
Refills: 0 | COMMUNITY
Start: 2019-05-20 | End: 2020-12-03

## 2019-08-14 NOTE — PROGRESS NOTES
HPI:   Aislinn Thompson is a 52year old female who presents for a complete physical exam. Pt see Gyne for her pap smears. Symptoms: denies discharge, itching, burning or dysuria, is menopausal, Pt's last menses was 6 years ago.  .   Regular SBE- yes,  Con paternal grandmother jaw cancer  Labs- 10/27/17  DEXA - n/a  Flu shot- gets at her job  Colon- n/a  Glasses/contacts- yes, last exam- 8/2019  Dental visits- 2016      Immunization History  Administered            Date(s) Administered    Influenza MG) MG Oral Tab Take 1 tablet by mouth daily.  Disp:  Rfl: 3      Past Medical History:   Diagnosis Date   • A-fib ([facility].)    • Obesity, unspecified 9/23/11   • KARMA (obstructive sleep apnea) 10/30/17-[facility]    AHI 5 REM AHI 10 Supine AHI 29 non-supine AHI 1 throat are clear  EYES:PERRLA, EOMI, normal optic disk,conjunctiva are clear  NECK: supple,no adenopathy,no bruits  CHEST: no chest tenderness  BREAST: no dominant or suspicious mass  LUNGS: clear to auscultation  CARDIO: RRR without murmur  GI: good BS's, with panic attacks  -Anxiety - no suicidal or homicidal thoughts. Long discussion about pathophysiology of anxiety and panic attacks. Treatment options discussed. Patient opted for treatment with medications.   Risks and benefits of medication(s) discuss

## 2019-08-19 ENCOUNTER — TELEPHONE (OUTPATIENT)
Dept: CARDIOLOGY | Age: 49
End: 2019-08-19

## 2019-08-20 ENCOUNTER — OFFICE VISIT (OUTPATIENT)
Dept: PODIATRY CLINIC | Facility: CLINIC | Age: 49
End: 2019-08-20
Payer: COMMERCIAL

## 2019-08-20 VITALS — SYSTOLIC BLOOD PRESSURE: 126 MMHG | DIASTOLIC BLOOD PRESSURE: 82 MMHG

## 2019-08-20 DIAGNOSIS — M19.079 ARTHRITIS OF MIDFOOT: ICD-10-CM

## 2019-08-20 DIAGNOSIS — M89.8X7 EXOSTOSIS OF LEFT FOOT: ICD-10-CM

## 2019-08-20 DIAGNOSIS — G57.92 NEURITIS OF FOOT, LEFT: ICD-10-CM

## 2019-08-20 DIAGNOSIS — G57.81 NEUROMA OF THIRD INTERSPACE OF RIGHT FOOT: Primary | ICD-10-CM

## 2019-08-20 PROCEDURE — 64455 NJX AA&/STRD PLTR COM DG NRV: CPT | Performed by: PODIATRIST

## 2019-08-20 PROCEDURE — 99203 OFFICE O/P NEW LOW 30 MIN: CPT | Performed by: PODIATRIST

## 2019-08-20 RX ORDER — TRIAMCINOLONE ACETONIDE 40 MG/ML
20 INJECTION, SUSPENSION INTRA-ARTICULAR; INTRAMUSCULAR ONCE
Status: COMPLETED | OUTPATIENT
Start: 2019-08-20 | End: 2019-08-20

## 2019-08-20 RX ADMIN — TRIAMCINOLONE ACETONIDE 20 MG: 40 INJECTION, SUSPENSION INTRA-ARTICULAR; INTRAMUSCULAR at 15:10:00

## 2019-08-20 NOTE — PROGRESS NOTES
Jenn Moses is a 52year old female. Patient presents with:  Consult: pt states she has an arthritic growth on her left foot. ultrasound from a few years ago. pt has seen 2 surgeons before this, and would like to consult w/Dr Crenshaw.  it is painful a Rfl:    Cholecalciferol (VITAMIN D) 2000 units Oral Cap Take 1 capsule by mouth daily. Disp:  Rfl:    Magnesium Oxide 400 (240 MG) MG Oral Tab Take 1 tablet by mouth daily.  Disp:  Rfl: 3   PARoxetine HCl (PAXIL) 10 MG Oral Tab Take 1 tablet (10 mg total) b Physical Exam  GENERAL: well developed, well nourished, in no apparent distress  EXTREMITIES:   1.  Integument: The patient was examined the skin is warm dry and supple on both feet on the dorsal midfoot about the level of the second maybe third and metat applied to this injection site patient will not walk barefoot until tomorrow. I wrote for her to have x-rays 3 views of the left foot because I did not have foot x-rays. The patient indicates understanding of these issues and agrees to the plan.     Ret

## 2019-08-20 NOTE — PROGRESS NOTES
Per Dr Robert Sharp, draw up 0.5mL of 0.5% Marcaine and 0.5mL of Kenalog 40 for injection to right foot.  KP

## 2019-08-27 RX ORDER — LANOLIN ALCOHOL/MO/W.PET/CERES
1 CREAM (GRAM) TOPICAL
COMMUNITY
Start: 2015-04-16 | End: 2019-09-17 | Stop reason: SDUPTHER

## 2019-08-27 RX ORDER — MULTIVIT-MIN/IRON/FOLIC ACID/K 18-600-40
1 CAPSULE ORAL
COMMUNITY

## 2019-08-27 RX ORDER — CHOLECALCIFEROL (VITAMIN D3) 50 MCG
2000 TABLET ORAL
COMMUNITY
Start: 2016-05-02 | End: 2019-09-17 | Stop reason: SDUPTHER

## 2019-08-27 RX ORDER — LORAZEPAM 0.5 MG/1
TABLET ORAL
Refills: 0 | COMMUNITY
Start: 2019-08-14 | End: 2019-09-17 | Stop reason: SDUPTHER

## 2019-08-28 ENCOUNTER — OFFICE VISIT (OUTPATIENT)
Dept: CARDIOLOGY | Age: 49
End: 2019-08-28

## 2019-08-28 VITALS
HEART RATE: 76 BPM | BODY MASS INDEX: 31.54 KG/M2 | HEIGHT: 63 IN | DIASTOLIC BLOOD PRESSURE: 70 MMHG | SYSTOLIC BLOOD PRESSURE: 110 MMHG | WEIGHT: 178 LBS

## 2019-08-28 DIAGNOSIS — Z86.79 HISTORY OF ATRIAL FIBRILLATION: Primary | ICD-10-CM

## 2019-08-28 DIAGNOSIS — Z79.899 ENCOUNTER FOR MONITORING FLECAINIDE THERAPY: ICD-10-CM

## 2019-08-28 DIAGNOSIS — Z51.81 ENCOUNTER FOR MONITORING FLECAINIDE THERAPY: ICD-10-CM

## 2019-08-28 PROCEDURE — 99214 OFFICE O/P EST MOD 30 MIN: CPT | Performed by: INTERNAL MEDICINE

## 2019-08-28 RX ORDER — TRIAMCINOLONE ACETONIDE 0.25 MG/G
CREAM TOPICAL
COMMUNITY
Start: 2019-08-10 | End: 2019-09-17 | Stop reason: SDUPTHER

## 2019-08-28 RX ORDER — PAROXETINE 10 MG/1
10 TABLET, FILM COATED ORAL
COMMUNITY
Start: 2019-08-14 | End: 2019-09-17 | Stop reason: SDUPTHER

## 2019-09-04 ENCOUNTER — TELEPHONE (OUTPATIENT)
Dept: CARDIOLOGY | Age: 49
End: 2019-09-04

## 2019-09-05 DIAGNOSIS — F41.0 GENERALIZED ANXIETY DISORDER WITH PANIC ATTACKS: ICD-10-CM

## 2019-09-05 DIAGNOSIS — F41.1 GENERALIZED ANXIETY DISORDER WITH PANIC ATTACKS: ICD-10-CM

## 2019-09-05 RX ORDER — PAROXETINE 10 MG/1
10 TABLET, FILM COATED ORAL EVERY MORNING
Qty: 30 TABLET | Refills: 0 | Status: SHIPPED | OUTPATIENT
Start: 2019-09-05 | End: 2019-09-09

## 2019-09-05 NOTE — PROGRESS NOTES
HISTORY OF PRESENT ILLNESS  Patient presents with:  Weight Problem: referred by another patient, has done metabollic weight loss in [place] in 2018, not opposed to medicine cannot have stimulants      Sylvie Hensley is a 52year old female new to our left foot OA  Liver disease: negative  Renal disease: negative  Diabetes: negative  Thyroid disease: negative  Constipation: +, uncertain cause.  [place] stool scale score 1-2- on stool softener  Other pertinent hx: acid reflux  Sleep Apnea hx: negative  Ca MCHC 35.5 07/30/2019    RDW 12.2 07/30/2019    .0 07/30/2019    MPV 10.4 10/18/2012     Lab Results   Component Value Date    GLU 95 07/30/2019    BUN 15 07/30/2019    BUNCREA 15.6 07/30/2019    CREATSERUM 0.96 07/30/2019    ANIONGAP 5 07/30/2019 therapeutic drug monitoring  -     VITAMIN B12; Future  -     SAXENDA 18 MG/3ML Subcutaneous Solution Pen-injector; Inject 3 mg into the skin daily.  Refer to discharge instructions for starting dose  -     Insulin Pen Needle (PEN NEEDLES) 32G X 4 MM Does n dieticians. Bring along your food journal (3 days minimum). See journal options below. 2.  Complete non fasting lab at [address] lab site prior to next office visit.   3.  Fill your prescribed medication and take as discussed and prescribed: Start Saxenda agustin difficult. Exercising is one way to help with stress, but meditation using the CALM Vira or another comparable alternative can be done in your home or place of work with little time commitment.  This Vira can also help work on behavior change and improve sle

## 2019-09-05 NOTE — TELEPHONE ENCOUNTER
Paroxetine HCl 10 Mg  Last OV relevant to medication: 8-14-19  Last refill date: 8-14-19  #/refills: 1  When pt was asked to return for OV: 1 mo.   Upcoming appt/reason: none  Recent labs: none

## 2019-09-09 ENCOUNTER — OFFICE VISIT (OUTPATIENT)
Dept: INTERNAL MEDICINE CLINIC | Facility: CLINIC | Age: 49
End: 2019-09-09
Payer: COMMERCIAL

## 2019-09-09 VITALS
SYSTOLIC BLOOD PRESSURE: 120 MMHG | WEIGHT: 174.13 LBS | HEART RATE: 92 BPM | DIASTOLIC BLOOD PRESSURE: 84 MMHG | HEIGHT: 63.5 IN | BODY MASS INDEX: 30.47 KG/M2

## 2019-09-09 DIAGNOSIS — K21.9 GASTROESOPHAGEAL REFLUX DISEASE WITHOUT ESOPHAGITIS: ICD-10-CM

## 2019-09-09 DIAGNOSIS — E66.9 OBESITY (BMI 30.0-34.9): ICD-10-CM

## 2019-09-09 DIAGNOSIS — I48.91 ATRIAL FIBRILLATION, UNSPECIFIED TYPE (HCC): ICD-10-CM

## 2019-09-09 DIAGNOSIS — Z51.81 ENCOUNTER FOR THERAPEUTIC DRUG MONITORING: Primary | ICD-10-CM

## 2019-09-09 DIAGNOSIS — K59.00 CONSTIPATION, UNSPECIFIED CONSTIPATION TYPE: ICD-10-CM

## 2019-09-09 PROBLEM — E66.811 OBESITY (BMI 30.0-34.9): Status: ACTIVE | Noted: 2019-09-09

## 2019-09-09 PROCEDURE — 99214 OFFICE O/P EST MOD 30 MIN: CPT | Performed by: NURSE PRACTITIONER

## 2019-09-09 RX ORDER — LIRAGLUTIDE 6 MG/ML
3 INJECTION, SOLUTION SUBCUTANEOUS DAILY
Qty: 1 PEN | Refills: 0 | COMMUNITY
Start: 2019-09-09 | End: 2019-10-09

## 2019-09-09 RX ORDER — PEN NEEDLE, DIABETIC 30 GX3/16"
1 NEEDLE, DISPOSABLE MISCELLANEOUS DAILY
Qty: 100 EACH | Refills: 1 | Status: SHIPPED | OUTPATIENT
Start: 2019-09-09 | End: 2020-10-13

## 2019-09-09 RX ORDER — LIRAGLUTIDE 6 MG/ML
3 INJECTION, SOLUTION SUBCUTANEOUS DAILY
Qty: 5 PEN | Refills: 2 | Status: SHIPPED | OUTPATIENT
Start: 2019-09-09 | End: 2019-12-04

## 2019-09-09 NOTE — PATIENT INSTRUCTIONS
Welcome to the [facility]...your Lifestyle Renovation begins now! Thank you for placing your trust in our health care team, I look forward to working with you along this journey to better health!     Next steps:     1.  Sched this is the start to mindful eating! Continue or start exercising to help establish a routine. If not already exercising begin with 1 day and progress as able with long-term goal of 30 minutes 5 days a week at a minimum.     Meditation daily can help man

## 2019-09-10 NOTE — H&P
Dennis Beltran MD - 2019 5:00 PM CDT  Formatting of this note might be different from the original.  [facility] SERVICES Specialists/[facility]  Clinic Note    Deanna Olguin  : 1970  PCP: Heather Brown MD    Reason for Consultation:  Chief Annette Jean smokeless tobacco. She reports current alcohol use.     Allergies:  ALLERGIES:  No Known Allergies    Medications:  Current Outpatient Medications   Medication Sig Dispense Refill   • triamcinolone (KENALOG) 0.025 % cream   • PARoxetine (PAXIL) 10 MG tablet Alkaline Phosphatate(Alk Phos) 10/03/2018 75 20 - 140 IU/L Final   • Thyroid Stimulating Hormone 10/03/2018 1.650 mlU/L Final   • Hemoglobin 10/03/2018 15.0 g/dL Final   • Hematocrit 10/03/2018 43.6 % Final   • Red Blood Count 10/03/2018 4.76 X 10-6/u Jocelynn

## 2019-09-12 ENCOUNTER — HOSPITAL ENCOUNTER (OUTPATIENT)
Dept: INTERVENTIONAL RADIOLOGY/VASCULAR | Facility: HOSPITAL | Age: 49
Discharge: HOME | End: 2019-09-12
Attending: INTERNAL MEDICINE | Admitting: INTERNAL MEDICINE
Payer: COMMERCIAL

## 2019-09-12 VITALS
SYSTOLIC BLOOD PRESSURE: 121 MMHG | OXYGEN SATURATION: 97 % | RESPIRATION RATE: 13 BRPM | HEART RATE: 75 BPM | TEMPERATURE: 99 F | DIASTOLIC BLOOD PRESSURE: 77 MMHG | HEIGHT: 63 IN | BODY MASS INDEX: 30.12 KG/M2 | WEIGHT: 170 LBS

## 2019-09-12 DIAGNOSIS — I49.3 PVC'S (PREMATURE VENTRICULAR CONTRACTIONS): ICD-10-CM

## 2019-09-12 DIAGNOSIS — I48.91 ATRIAL FIBRILLATION, UNSPECIFIED TYPE (HCC): ICD-10-CM

## 2019-09-12 PROCEDURE — 33285 INSJ SUBQ CAR RHYTHM MNTR: CPT | Performed by: INTERNAL MEDICINE

## 2019-09-12 PROCEDURE — 33285 INSJ SUBQ CAR RHYTHM MNTR: CPT

## 2019-09-12 PROCEDURE — 0JH632Z INSERTION OF MONITORING DEVICE INTO CHEST SUBCUTANEOUS TISSUE AND FASCIA, PERCUTANEOUS APPROACH: ICD-10-PCS | Performed by: INTERNAL MEDICINE

## 2019-09-12 RX ORDER — LIDOCAINE HYDROCHLORIDE AND EPINEPHRINE 10; 10 MG/ML; UG/ML
10 INJECTION, SOLUTION INFILTRATION; PERINEURAL ONCE
Status: DISCONTINUED | OUTPATIENT
Start: 2019-09-12 | End: 2019-09-12

## 2019-09-12 NOTE — PROGRESS NOTES
Linq inserted at bedside per Dr. Hugo Pimentel. Puncture site dressing c/d/i soft. Discharge instructions reviewed. Pt discharged to home in stable condition with bedside monitor. No questions.

## 2019-09-12 NOTE — PROCEDURES
Procedure- LINQ device implant. - M. Beryle Even, MD    Indication- atrial fibrillation    Complication- none    Methods- The patient was prepped and draped in a sterile manner.  Local anesthesia was infiltrated into the 4th parasternal intercostal sp

## 2019-09-13 ENCOUNTER — TELEPHONE (OUTPATIENT)
Dept: CARDIOLOGY | Age: 49
End: 2019-09-13

## 2019-09-13 NOTE — PROGRESS NOTES
Called pt back to check on her after her LINQ insertion yesterday. She states it feels like \"it's burning\" where the incision site is located. Denies redness, drainage or other signs of infection.     Instructed pt to take Tylenol/Motrin and ice the area

## 2019-09-17 ENCOUNTER — ANCILLARY PROCEDURE (OUTPATIENT)
Dept: CARDIOLOGY | Age: 49
End: 2019-09-17
Attending: INTERNAL MEDICINE

## 2019-09-17 ENCOUNTER — ANCILLARY ORDERS (OUTPATIENT)
Dept: CARDIOLOGY | Age: 49
End: 2019-09-17

## 2019-09-17 VITALS
HEART RATE: 78 BPM | DIASTOLIC BLOOD PRESSURE: 68 MMHG | SYSTOLIC BLOOD PRESSURE: 98 MMHG | WEIGHT: 171 LBS | BODY MASS INDEX: 30.29 KG/M2

## 2019-09-17 DIAGNOSIS — R00.2 PALPITATIONS: ICD-10-CM

## 2019-09-17 DIAGNOSIS — Z45.018 PACEMAKER REPROGRAMMING/CHECK: ICD-10-CM

## 2019-09-17 PROCEDURE — 93285 PRGRMG DEV EVAL SCRMS IP: CPT | Performed by: INTERNAL MEDICINE

## 2019-09-17 RX ORDER — UBIDECARENONE 75 MG
1 CAPSULE ORAL DAILY
COMMUNITY

## 2019-09-17 RX ORDER — TRIAMCINOLONE ACETONIDE 0.25 MG/G
CREAM TOPICAL PRN
COMMUNITY
Start: 2019-08-10

## 2019-09-20 ENCOUNTER — TELEPHONE (OUTPATIENT)
Dept: CARDIOLOGY | Age: 49
End: 2019-09-20

## 2019-10-06 DIAGNOSIS — F41.0 GENERALIZED ANXIETY DISORDER WITH PANIC ATTACKS: ICD-10-CM

## 2019-10-06 DIAGNOSIS — F41.1 GENERALIZED ANXIETY DISORDER WITH PANIC ATTACKS: ICD-10-CM

## 2019-10-07 RX ORDER — PAROXETINE 10 MG/1
TABLET, FILM COATED ORAL
Qty: 30 TABLET | Refills: 1 | OUTPATIENT
Start: 2019-10-07

## 2019-10-07 NOTE — TELEPHONE ENCOUNTER
Called pt. To let her she is due for an appt. Pt states she d/c due to it making her more anxious. She doesn't know why we keep getting request for refills.      Paroxetine HCl 10 Mg  Last OV relevant to medication: 8-14-19  Last refill date: 9-5-19  #/refi

## 2019-10-09 ENCOUNTER — OFFICE VISIT (OUTPATIENT)
Dept: INTERNAL MEDICINE CLINIC | Facility: CLINIC | Age: 49
End: 2019-10-09
Payer: COMMERCIAL

## 2019-10-09 VITALS
DIASTOLIC BLOOD PRESSURE: 70 MMHG | RESPIRATION RATE: 14 BRPM | WEIGHT: 167 LBS | HEART RATE: 72 BPM | HEIGHT: 63.5 IN | SYSTOLIC BLOOD PRESSURE: 110 MMHG | BODY MASS INDEX: 29.22 KG/M2

## 2019-10-09 DIAGNOSIS — K59.00 CONSTIPATION, UNSPECIFIED CONSTIPATION TYPE: ICD-10-CM

## 2019-10-09 DIAGNOSIS — K21.9 GASTROESOPHAGEAL REFLUX DISEASE WITHOUT ESOPHAGITIS: ICD-10-CM

## 2019-10-09 DIAGNOSIS — Z51.81 ENCOUNTER FOR THERAPEUTIC DRUG MONITORING: Primary | ICD-10-CM

## 2019-10-09 DIAGNOSIS — E66.9 OBESITY (BMI 30.0-34.9): ICD-10-CM

## 2019-10-09 DIAGNOSIS — I48.91 ATRIAL FIBRILLATION, UNSPECIFIED TYPE (HCC): ICD-10-CM

## 2019-10-09 PROCEDURE — 99214 OFFICE O/P EST MOD 30 MIN: CPT | Performed by: NURSE PRACTITIONER

## 2019-10-09 NOTE — PROGRESS NOTES
Gali Cui is a 52year old female presents today for 1 month follow-up on medical weight loss program for the treatment of overweight, obesity, or morbid obesity with associated Afib, GERD.     S:  Current weight Wt Readings from Last 6 Encounters: and S2 without clicks or gallops, no pedal edema.   GI: +BS, soft  NEURO/MS: motor and sensory grossly intact  PSYCH: pleasant, cooperative, normal mood and affect    ASSESSMENT AND PLAN:  Encounter for therapeutic drug monitoring  (primary encounter diagno and Wellness  September 28, 2018 • Posted in Arias Diogo, Exercise • By Your [place] Matters Campaign     When many people think about incorporating more activity into their exercise routine, images of cardio, sports or strength training often come to mind.  Unfort before you go to bed, and before and after you exercise to maximize each health benefit listed above. Not only will this help you improve your performance on your weight-loss journey, but it will help get your mind in the right spot, too!     Constipation constipation, or long-term constipation, to rule out other causes such as medicines or thyroid disease. © 6586-1981 The [address] [place] [address], [facility] [place]. All rights reserved.  This information is not intended as a substitute

## 2019-10-09 NOTE — PATIENT INSTRUCTIONS
Continue making lifestyle changes that focus on good nutrition, regular exercise and stress management. Medication Plan: Continue Saxenda. Consider adding Trulance or Linzess for constipation.  Additionally I recommend follow up with your PCP to discuss nutrient supply and improved blood flow. Enhance your flexibility – Want to improve your range of motion? Try stretching! You’ll not only have more flexible joints, but you’ll also decrease your risk for injury.    Promote blood circulation – Not only manoj carrots, broccoli, and greens  · Fresh fruits (especially apples, pears, and dried fruits like raisins and apricots)  · Nuts and legumes (especially beans such as lentils, kidney beans, and lima beans)  Get physically active  Exercise helps improve the wor

## 2019-10-13 PROCEDURE — 93299 CHECK IMPLANT CARDIO OR SUBQ RHYTHM MNTR REMOTE UP TO 30 DAY TECH EVAL: CPT | Performed by: INTERNAL MEDICINE

## 2019-10-13 PROCEDURE — 93298 REM INTERROG DEV EVAL SCRMS: CPT | Performed by: INTERNAL MEDICINE

## 2019-11-01 ENCOUNTER — TELEPHONE (OUTPATIENT)
Dept: CARDIOLOGY | Age: 49
End: 2019-11-01

## 2019-11-01 ENCOUNTER — ANCILLARY PROCEDURE (OUTPATIENT)
Dept: CARDIOLOGY | Age: 49
End: 2019-11-01
Attending: INTERNAL MEDICINE

## 2019-11-01 DIAGNOSIS — R00.0 TACHYCARDIA: ICD-10-CM

## 2019-11-04 RX ORDER — PANTOPRAZOLE SODIUM 20 MG/1
TABLET, DELAYED RELEASE ORAL
Qty: 90 TABLET | Refills: 0 | OUTPATIENT
Start: 2019-11-04

## 2019-11-07 ENCOUNTER — OFFICE VISIT (OUTPATIENT)
Dept: INTERNAL MEDICINE CLINIC | Facility: CLINIC | Age: 49
End: 2019-11-07
Payer: COMMERCIAL

## 2019-11-07 VITALS
SYSTOLIC BLOOD PRESSURE: 120 MMHG | HEART RATE: 70 BPM | RESPIRATION RATE: 14 BRPM | WEIGHT: 170 LBS | HEIGHT: 63.5 IN | DIASTOLIC BLOOD PRESSURE: 70 MMHG | BODY MASS INDEX: 29.75 KG/M2

## 2019-11-07 DIAGNOSIS — Z51.81 ENCOUNTER FOR THERAPEUTIC DRUG MONITORING: Primary | ICD-10-CM

## 2019-11-07 DIAGNOSIS — F41.0 GENERALIZED ANXIETY DISORDER WITH PANIC ATTACKS: ICD-10-CM

## 2019-11-07 DIAGNOSIS — F41.1 GENERALIZED ANXIETY DISORDER WITH PANIC ATTACKS: ICD-10-CM

## 2019-11-07 DIAGNOSIS — E66.9 OBESITY (BMI 30.0-34.9): ICD-10-CM

## 2019-11-07 DIAGNOSIS — I48.91 ATRIAL FIBRILLATION, UNSPECIFIED TYPE (HCC): ICD-10-CM

## 2019-11-07 PROCEDURE — 99214 OFFICE O/P EST MOD 30 MIN: CPT | Performed by: NURSE PRACTITIONER

## 2019-11-07 RX ORDER — TOPIRAMATE 25 MG/1
25 TABLET ORAL 2 TIMES DAILY
Qty: 60 TABLET | Refills: 1 | Status: SHIPPED | OUTPATIENT
Start: 2019-11-07 | End: 2019-12-18

## 2019-11-07 NOTE — PATIENT INSTRUCTIONS
Continue making lifestyle changes that focus on good nutrition, regular exercise and stress management. Medication Plan: Continue current medication regimen, if new needles reduce bruising contact office for script.  Start Topamax at 1 tab daily for 7 da https://Websupport/5min-kitchen-workout/    Nutrition Trackers and Tools  · MyFitness Pal  · LoseIT! · FitFoundation (healthy meals on the go) in  @ www. teyzjwcefkjpp5g.WaveDeck  · Gobble, Blue Apron, Home  - on line meal delivery programs coping strategies you can use to fight back.   Hormones  When your brain detects the presence of a threat, no matter if it is a snake in the grass, a grumpy boss, or a big credit card bill, it triggers the release of a cascade of chemicals, including adrena fit into those edd jeans you splurged on, leading to more stress about money wasted!  Unfortunately, excess cortisol also slows down your metabolism, because your body wants to maintain an adequate supply of glucose for all that hard mental and physical stressed, we also may be more likely to drive through the [address] [place] [address], rather than taking the time and mental energy to plan and cook a meal. Americans are less likely to cook and eat dinner at home than people from many other countries, and they also Stressed  Exercise  Aerobic exercise has a one-two punch. It can decrease cortisol and trigger release of chemicals that relieve pain and improve mood.  It can also help speed your metabolism so you burn off the extra indulgences  Eat Mindfully  Mindful Eat Author: Marge George, Ph.D

## 2019-11-07 NOTE — PROGRESS NOTES
Noa Reed is a 52year old female presents today for 2 month follow-up on medical weight loss program for the treatment of overweight, obesity, or morbid obesity with associated Afib, GERD.     S:  Current weight Wt Readings from Last 6 Encounters: edema.  GI: +BS, soft  NEURO/MS: motor and sensory grossly intact  PSYCH: pleasant, cooperative, normal mood and affect    ASSESSMENT AND PLAN:  Encounter for therapeutic drug monitoring  (primary encounter diagnosis)  Obesity (bmi 30.0-34. 9)  Atrial fibri medication. Next steps to work on before next office visit include: Make it a routine of reading the daily blog from www.yourweightmatters. org for motivation, exercise and nutrition tips relevant to today's culture.  Set a notification on your smart phon Hungry? Mindful eating virtual  ayala  · Www.yourweightmatters. org - Obesity [employer] Coalition sponsored Blog posts daily    Books/Video Education  · [employer] by Betzy Hay  · The Complete Guide to fasting by Dr. Claudia Washington  · Sugar, [facility] by Derrell Yusuf blood flows away from the internal organs and to your large muscles to prepare for “fight or flight.” However, once the effects of adrenaline wear off, cortisol, known as the “stress hormone,” hangs around and starts signaling the body to replenish your fo stressed.  While we may burn off some extra calories fidgeting or running around cleaning because we can’t sit still, anxiety can also trigger “emotional eating.” Overeating or eating unhealthy foods in response to stress or as a way to calm down is a very study showed, in laboratory mice, that being “stressed” by exposure to the smell of a predator lead the mice to eat more high-fat food pellets, when given the choice of eating these instead of normal feed.   Less Sleep  Do you ever lie awake at night worryi or smell. You also learn to tune into your subjective feelings of hunger or fullness, rather than eating just because it’s a mealtime or because there is food in front of you.  A well-designed study of binge-eaters showed that participating in a Mindful Eat

## 2019-11-12 ENCOUNTER — TELEPHONE (OUTPATIENT)
Dept: INTERNAL MEDICINE CLINIC | Facility: CLINIC | Age: 49
End: 2019-11-12

## 2019-11-12 RX ORDER — BLOOD SUGAR DIAGNOSTIC
1 STRIP MISCELLANEOUS DAILY
Qty: 100 EACH | Refills: 1 | Status: SHIPPED | OUTPATIENT
Start: 2019-11-12 | End: 2020-11-19

## 2019-11-12 NOTE — TELEPHONE ENCOUNTER
Patient was seen 11/7/19 - previously on 4mm. Rx sent for 6 mm now. Used with Saxenda. LM RX sent to [facility] today.

## 2019-11-12 NOTE — TELEPHONE ENCOUNTER
Patient was given samples of the larger pen needles and says it is working better and would like a new rx .

## 2019-11-13 PROCEDURE — 93299 CHECK IMPLANT CARDIO OR SUBQ RHYTHM MNTR REMOTE UP TO 30 DAY TECH EVAL: CPT | Performed by: INTERNAL MEDICINE

## 2019-11-13 PROCEDURE — 93298 REM INTERROG DEV EVAL SCRMS: CPT | Performed by: INTERNAL MEDICINE

## 2019-11-15 RX ORDER — TOPIRAMATE 25 MG/1
25 TABLET ORAL
COMMUNITY
Start: 2019-11-07

## 2019-11-18 ENCOUNTER — OFFICE VISIT (OUTPATIENT)
Dept: CARDIOLOGY | Age: 49
End: 2019-11-18

## 2019-11-18 VITALS
SYSTOLIC BLOOD PRESSURE: 110 MMHG | DIASTOLIC BLOOD PRESSURE: 60 MMHG | WEIGHT: 169 LBS | HEIGHT: 63 IN | HEART RATE: 72 BPM | BODY MASS INDEX: 29.95 KG/M2

## 2019-11-18 DIAGNOSIS — Z79.899 ENCOUNTER FOR MONITORING FLECAINIDE THERAPY: ICD-10-CM

## 2019-11-18 DIAGNOSIS — Z51.81 ENCOUNTER FOR MONITORING FLECAINIDE THERAPY: ICD-10-CM

## 2019-11-18 DIAGNOSIS — Z86.79 HISTORY OF ATRIAL FIBRILLATION: Primary | ICD-10-CM

## 2019-11-18 PROCEDURE — 99213 OFFICE O/P EST LOW 20 MIN: CPT | Performed by: INTERNAL MEDICINE

## 2019-11-18 RX ORDER — PANTOPRAZOLE SODIUM 20 MG/1
TABLET, DELAYED RELEASE ORAL
Qty: 90 TABLET | Refills: 0 | OUTPATIENT
Start: 2019-11-18

## 2019-11-18 ASSESSMENT — PATIENT HEALTH QUESTIONNAIRE - PHQ9
SUM OF ALL RESPONSES TO PHQ9 QUESTIONS 1 AND 2: 0
1. LITTLE INTEREST OR PLEASURE IN DOING THINGS: NOT AT ALL
2. FEELING DOWN, DEPRESSED OR HOPELESS: NOT AT ALL
SUM OF ALL RESPONSES TO PHQ9 QUESTIONS 1 AND 2: 0

## 2019-11-20 ENCOUNTER — TELEPHONE (OUTPATIENT)
Dept: CARDIOLOGY | Age: 49
End: 2019-11-20

## 2019-11-22 RX ORDER — PANTOPRAZOLE SODIUM 20 MG/1
20 TABLET, DELAYED RELEASE ORAL DAILY
Qty: 90 TABLET | Refills: 3 | Status: SHIPPED | OUTPATIENT
Start: 2019-11-22 | End: 2020-12-08

## 2019-11-29 DIAGNOSIS — E66.9 OBESITY (BMI 30.0-34.9): ICD-10-CM

## 2019-11-29 DIAGNOSIS — Z51.81 ENCOUNTER FOR THERAPEUTIC DRUG MONITORING: ICD-10-CM

## 2019-12-02 RX ORDER — TOPIRAMATE 25 MG/1
TABLET ORAL
Qty: 60 TABLET | Refills: 1 | OUTPATIENT
Start: 2019-12-02

## 2019-12-02 NOTE — TELEPHONE ENCOUNTER
Requesting Topiramate  LOV: 11/7/19  RTC: one month  Last Relevant Labs: na  Filled: 11/7/19 #60 with 1 refills    Future Appointments   Date Time Provider Tonia Villagomez   12/4/2019  2:20 PM O[facility] Mora [address]   12/30/2019  3:00

## 2019-12-04 ENCOUNTER — OFFICE VISIT (OUTPATIENT)
Dept: INTERNAL MEDICINE CLINIC | Facility: CLINIC | Age: 49
End: 2019-12-04
Payer: COMMERCIAL

## 2019-12-04 VITALS
HEIGHT: 63.5 IN | DIASTOLIC BLOOD PRESSURE: 64 MMHG | SYSTOLIC BLOOD PRESSURE: 100 MMHG | BODY MASS INDEX: 29.92 KG/M2 | HEART RATE: 70 BPM | WEIGHT: 171 LBS | RESPIRATION RATE: 14 BRPM

## 2019-12-04 DIAGNOSIS — F41.1 GENERALIZED ANXIETY DISORDER WITH PANIC ATTACKS: ICD-10-CM

## 2019-12-04 DIAGNOSIS — I48.91 ATRIAL FIBRILLATION, UNSPECIFIED TYPE (HCC): ICD-10-CM

## 2019-12-04 DIAGNOSIS — Z51.81 ENCOUNTER FOR THERAPEUTIC DRUG MONITORING: Primary | ICD-10-CM

## 2019-12-04 DIAGNOSIS — E66.9 OBESITY (BMI 30.0-34.9): ICD-10-CM

## 2019-12-04 DIAGNOSIS — F41.0 GENERALIZED ANXIETY DISORDER WITH PANIC ATTACKS: ICD-10-CM

## 2019-12-04 PROCEDURE — 99213 OFFICE O/P EST LOW 20 MIN: CPT | Performed by: NURSE PRACTITIONER

## 2019-12-04 RX ORDER — LIRAGLUTIDE 6 MG/ML
3 INJECTION, SOLUTION SUBCUTANEOUS DAILY
Qty: 15 PEN | Refills: 1 | Status: SHIPPED | OUTPATIENT
Start: 2019-12-04 | End: 2020-03-19

## 2019-12-04 NOTE — PATIENT INSTRUCTIONS
Continue making lifestyle changes that focus on good nutrition, regular exercise and stress management. Medication Plan: Continue current medication regimen, option to increase Topamax to twice a day as prescribed.     Next steps to work on before next o younger. An increase in visceral (abdominal) fat is linked to an increase in insulin resistance, diabetes, and inflammatory diseases.     Another factor contributing to weight gain in perimenopause may be the increased appetite and calorie intake that occur portion when available, or put half in the takeout box right away. Swap out dessert for fruit or yogurt. Reduce carbs. Cut back on carbs in order to reduce the increase in belly fat, which drives metabolic problems   Add fiber.  Eat a high-fiber diet that

## 2019-12-04 NOTE — PROGRESS NOTES
Denny Mendez is a 52year old female presents today for 3 month follow-up on medical weight loss program for the treatment of overweight, obesity, or morbid obesity with associated Afib, GERD.     S:  Current weight Wt Readings from Last 6 Encounters: 171 lb (77.6 kg)   BMI 29.82 kg/m²   GENERAL: well developed, well nourished, in no apparent distress, overweight  SKIN: right lateral/medial upper thigh with yellow bruise, no redness or warmth  EYES: conjunctiva pink, sclera non icteric, PERRLA  LUNGS: C management. Medication Plan: Continue current medication regimen, option to increase Topamax to twice a day as prescribed. Next steps to work on before next office visit include:  Follow up with PCP if right upper thigh bruising not resolved or worsen resistance, diabetes, and inflammatory diseases. Another factor contributing to weight gain in perimenopause may be the increased appetite and calorie intake that occurs in response to hormone changes.  In one study, levels of the “hunger hormone” ghreli fruit or yogurt. Reduce carbs. Cut back on carbs in order to reduce the increase in belly fat, which drives metabolic problems   Add fiber. Eat a high-fiber diet that includes flaxseeds, which may improve insulin sensitivity. Work out.  Engage in streng

## 2019-12-14 PROCEDURE — 93299 CHECK IMPLANT CARDIO OR SUBQ RHYTHM MNTR REMOTE UP TO 30 DAY TECH EVAL: CPT | Performed by: INTERNAL MEDICINE

## 2019-12-14 PROCEDURE — 93298 REM INTERROG DEV EVAL SCRMS: CPT | Performed by: INTERNAL MEDICINE

## 2019-12-18 DIAGNOSIS — Z51.81 ENCOUNTER FOR THERAPEUTIC DRUG MONITORING: ICD-10-CM

## 2019-12-18 DIAGNOSIS — E66.9 OBESITY (BMI 30.0-34.9): ICD-10-CM

## 2019-12-18 RX ORDER — TOPIRAMATE 25 MG/1
25 TABLET ORAL 2 TIMES DAILY
Qty: 180 TABLET | Refills: 0 | Status: SHIPPED | OUTPATIENT
Start: 2019-12-18 | End: 2020-02-04

## 2019-12-18 NOTE — TELEPHONE ENCOUNTER
Patient called to ask for 90 day of topiramate. She is out of pills and needs urgently.   Her insurance requires 90 day    Requesting Topiramate  LOV: 12/4/19  RTC: 3 months  Last Relevant Labs: na  Filled: 11/7/19 #60 with 1 refills    Future Appointments

## 2019-12-30 ENCOUNTER — OFFICE VISIT (OUTPATIENT)
Dept: INTERNAL MEDICINE CLINIC | Facility: CLINIC | Age: 49
End: 2019-12-30
Payer: COMMERCIAL

## 2019-12-30 DIAGNOSIS — K21.9 GASTROESOPHAGEAL REFLUX DISEASE WITHOUT ESOPHAGITIS: ICD-10-CM

## 2019-12-30 DIAGNOSIS — E66.3 OVERWEIGHT WITH BODY MASS INDEX (BMI) 25.0-29.9: ICD-10-CM

## 2019-12-30 PROCEDURE — 97802 MEDICAL NUTRITION INDIV IN: CPT | Performed by: DIETITIAN, REGISTERED

## 2019-12-31 NOTE — PROGRESS NOTES
INITIAL OUTPATIENT NUTRITION CONSULTATION    Nutrition Assessment    Medical Diagnosis: Overweight, GERD    Client Age and Gender: 52year old female    Marital Status and Occupation: Single/ with teens at home.   Works as surgery tech at [employer] <100 mg/dL   Borderline 100-129 mg/dL   High     >=130mg/dL         LDL CHOLESTROL   Date Value Ref Range Status   03/20/2012 63 <100 mg/dL Final     HDL Cholesterol   Date Value Ref Range Status   08/14/2019 99 (H) 40 - 59 mg/dL Final     Comment:       I Ms one day at work  Beverages: Water with lemon.   Goal of 120 ozs/d    Meal pattern: 2 meals/d, excessive snacks/d    Number of meals/week eaten at restaurants: not discussed        Alcohol Intake: not discussed    Diet Quality: Moderate quality meals,  Sn

## 2020-01-01 ENCOUNTER — EXTERNAL RECORD (OUTPATIENT)
Dept: OTHER | Age: 50
End: 2020-01-01

## 2020-01-30 ENCOUNTER — TELEPHONE (OUTPATIENT)
Dept: INTERNAL MEDICINE CLINIC | Facility: CLINIC | Age: 50
End: 2020-01-30

## 2020-01-30 NOTE — TELEPHONE ENCOUNTER
Patient was last seen 12/30/19 and called with concerns of continuing topamax. She feels it does nothing for her. She started at 25 mg at night and just recently went to bid. She was extremely fatigued with it.   She is still taking [place] but stalled w

## 2020-02-03 NOTE — TELEPHONE ENCOUNTER
I think it's best she make a f/u to discuss her concerns. So many factors contributing to her weight, many of which may be mind over matter. Thanks for the update.

## 2020-02-04 ENCOUNTER — OFFICE VISIT (OUTPATIENT)
Dept: INTERNAL MEDICINE CLINIC | Facility: CLINIC | Age: 50
End: 2020-02-04
Payer: COMMERCIAL

## 2020-02-04 VITALS
HEART RATE: 72 BPM | RESPIRATION RATE: 16 BRPM | BODY MASS INDEX: 30.1 KG/M2 | HEIGHT: 63.5 IN | SYSTOLIC BLOOD PRESSURE: 102 MMHG | DIASTOLIC BLOOD PRESSURE: 64 MMHG | WEIGHT: 172 LBS

## 2020-02-04 DIAGNOSIS — E66.9 OBESITY (BMI 30.0-34.9): ICD-10-CM

## 2020-02-04 DIAGNOSIS — F43.9 STRESS: ICD-10-CM

## 2020-02-04 DIAGNOSIS — Z51.81 ENCOUNTER FOR THERAPEUTIC DRUG MONITORING: Primary | ICD-10-CM

## 2020-02-04 DIAGNOSIS — R63.8 CRAVING FOR PARTICULAR FOOD: ICD-10-CM

## 2020-02-04 PROCEDURE — 99214 OFFICE O/P EST MOD 30 MIN: CPT | Performed by: NURSE PRACTITIONER

## 2020-02-04 RX ORDER — TOPIRAMATE 50 MG/1
50 TABLET, FILM COATED ORAL 2 TIMES DAILY
Qty: 180 TABLET | Refills: 0 | Status: SHIPPED | OUTPATIENT
Start: 2020-02-04 | End: 2020-02-07 | Stop reason: SINTOL

## 2020-02-04 NOTE — PROGRESS NOTES
Jenn Moses is a 52year old female presents today for 5 month follow-up on medical weight loss program for the treatment of overweight, obesity, or morbid obesity with associated Afib, GERD.     S:  Current weight Wt Readings from Last 6 Encounters: nourished, in no apparent distress, overweight  SKIN: right lateral/medial upper thigh with yellow bruise, no redness or warmth  EYES: conjunctiva pink, sclera non icteric, PERRLA  LUNGS: CTA in all fields, breathing non labored  CARDIO: RRR without murmur include: New Year, New YOU! Begin to look at food as fuel by starting with increasing nutrient dense foods in your diet daily.  I recommend adding a salad to your nutrition plan daily and eliminating animal based proteins (meat, dairy and eggs) 2 days of th together, they make tissue that brings life to your bones, brain, skin, nerves, muscles, etc. The health and lifespan of your cells depend on the nutrients you get from food. That is why healthy food choices are so important.    Once you can start to see fo brain?  Nutrition and [facility] probably know that it doesn't feel good to eat a lot of carbs, sugars, fats and starches all the time. Actually, the same can be said for your brain, too.   The more a food is nutritionally sound, the more it can fee nutrients like B and E vitamins. Greens and Veggies  So many veggies are great for reducing oxidative stress and protecting your brain from declining with age. These include broccoli, cauliflower, bok olvin, turnips, cabbage and [place] sprouts.  Leafy gre

## 2020-02-04 NOTE — PATIENT INSTRUCTIONS
Continue making lifestyle changes that focus on good nutrition, regular exercise and stress management. Medication Plan: Continue current medication regimen, aside from increase Topamax to 50 mg twice a day.     Next steps to work on before next office v this by getting nutrition from whole grains, vitamins, minerals, fats, protein and other nutrients. These essential nutrients matter greatly to every single cell in your body.  They make up your:  • Cell membrane  • Nucleus  • Mitochondria  When cells join regularly eating nutritious foods will:  • Give us more energy  • Build and repair muscles  • Control our blood sugar levels  • Help our body with daily tasks and processes  But have you ever thought about the impact that nutrition has on the brain?   Nutri abilities, increasing blood flow in the brain and building the structure of neurons.   Whole Grains  Don't always associate grains with “bad carbs.” Whole grains like brown rice, barley, oatmeal and bulgur wheat are especially full of nutrients like B and E

## 2020-02-06 ENCOUNTER — TELEPHONE (OUTPATIENT)
Dept: INTERNAL MEDICINE CLINIC | Facility: CLINIC | Age: 50
End: 2020-02-06

## 2020-02-06 NOTE — TELEPHONE ENCOUNTER
Have her stop the Topamax all together d/t side effects. Limited options outside of what she is taking d/t her PMH. I recommend she get started with counseling as was last referred and set this as priority for the month. Thanks.

## 2020-02-06 NOTE — TELEPHONE ENCOUNTER
Patient called. She was recently increased with her topamax dose from 25 mg bid to 50 mg bid. She cannot think, cannot reply to people terrible brain fog, feels awful. She said the 25 mg was not helping enough. How do you want to proceed?

## 2020-02-07 NOTE — TELEPHONE ENCOUNTER
Called and spoke with patient. She took 1/2 tab this am and still feels the same. Advised her to stop topamax all together and make counseling her priority. Gave her Akil's number so she can call since no one has called her.  Pt verbalized understanding

## 2020-02-14 PROCEDURE — 93298 REM INTERROG DEV EVAL SCRMS: CPT | Performed by: INTERNAL MEDICINE

## 2020-02-14 PROCEDURE — G2066 INTER DEVC REMOTE 30D: HCPCS | Performed by: INTERNAL MEDICINE

## 2020-02-21 ENCOUNTER — TELEPHONE (OUTPATIENT)
Dept: CARDIOLOGY | Age: 50
End: 2020-02-21

## 2020-02-21 RX ORDER — FLECAINIDE ACETATE 50 MG/1
TABLET ORAL
Qty: 180 TABLET | Refills: 0 | Status: SHIPPED | OUTPATIENT
Start: 2020-02-21 | End: 2020-06-02

## 2020-03-16 PROCEDURE — 93298 REM INTERROG DEV EVAL SCRMS: CPT | Performed by: INTERNAL MEDICINE

## 2020-03-16 PROCEDURE — G2066 INTER DEVC REMOTE 30D: HCPCS | Performed by: INTERNAL MEDICINE

## 2020-03-19 ENCOUNTER — TELEPHONE (OUTPATIENT)
Dept: INTERNAL MEDICINE CLINIC | Facility: CLINIC | Age: 50
End: 2020-03-19

## 2020-03-19 DIAGNOSIS — E66.9 OBESITY (BMI 30.0-34.9): ICD-10-CM

## 2020-03-19 DIAGNOSIS — Z51.81 ENCOUNTER FOR THERAPEUTIC DRUG MONITORING: ICD-10-CM

## 2020-03-19 RX ORDER — LIRAGLUTIDE 6 MG/ML
3 INJECTION, SOLUTION SUBCUTANEOUS DAILY
Qty: 5 PEN | Refills: 1 | Status: SHIPPED | OUTPATIENT
Start: 2020-03-19 | End: 2020-05-11

## 2020-03-19 NOTE — TELEPHONE ENCOUNTER
Patient will call back to schedule     Name of Medication:SAXENDA 18 MG/3ML Subcutaneous Solution Pen-injector   Insulin Pen Needle (PEN NEEDLES) 32G X 6 MM Does not apply Misc       Dose:     How is medication prescribed:    Specific name of pharmacy and l

## 2020-04-16 PROCEDURE — 93298 REM INTERROG DEV EVAL SCRMS: CPT | Performed by: INTERNAL MEDICINE

## 2020-04-16 PROCEDURE — G2066 INTER DEVC REMOTE 30D: HCPCS | Performed by: INTERNAL MEDICINE

## 2020-04-22 ENCOUNTER — TELEPHONE (OUTPATIENT)
Dept: INTERNAL MEDICINE CLINIC | Facility: HOSPITAL | Age: 50
End: 2020-04-22

## 2020-04-22 ENCOUNTER — LAB ENCOUNTER (OUTPATIENT)
Dept: LAB | Facility: HOSPITAL | Age: 50
End: 2020-04-22
Attending: PREVENTIVE MEDICINE
Payer: COMMERCIAL

## 2020-04-22 DIAGNOSIS — Z20.822 EXPOSURE TO COVID-19 VIRUS: ICD-10-CM

## 2020-04-22 DIAGNOSIS — Z20.822 EXPOSURE TO COVID-19 VIRUS: Primary | ICD-10-CM

## 2020-04-22 NOTE — PROGRESS NOTES
Rapid Covid test - Not detected - Results to [facility] for resulting and employee follow up.

## 2020-04-22 NOTE — IMMEDIATE CARE/WORKERS COMP PHYSICIAN REPORT
Called employee (verified by 2 identifiers) to give NEGATIVE results of COVID test that were reviewed by Dr. Mame Cottrell.     Per [facility] negative COVID guidelines:    · Employee instructed to continue to self-monitor symptoms and RTW when fever free for 24

## 2020-04-25 DIAGNOSIS — E66.9 OBESITY (BMI 30.0-34.9): ICD-10-CM

## 2020-04-25 DIAGNOSIS — Z51.81 ENCOUNTER FOR THERAPEUTIC DRUG MONITORING: ICD-10-CM

## 2020-04-27 RX ORDER — TOPIRAMATE 50 MG/1
TABLET, FILM COATED ORAL
Qty: 180 TABLET | Refills: 0 | OUTPATIENT
Start: 2020-04-27

## 2020-04-27 NOTE — TELEPHONE ENCOUNTER
Requesting   Requested Prescriptions     Pending Prescriptions Disp Refills   • TOPIRAMATE 50 MG Oral Tab [Pharmacy Med Name: TOPIRAMATE 50 MG TABLET] 180 tablet 0     Sig: TAKE 1 TABLET BY MOUTH TWICE A DAY     LOV: 2/4/20  RTC: 1 month  Filled: 2/4/20 #1

## 2020-05-05 ENCOUNTER — TELEPHONE (OUTPATIENT)
Dept: CARDIOLOGY | Age: 50
End: 2020-05-05

## 2020-05-11 DIAGNOSIS — E66.9 OBESITY (BMI 30.0-34.9): ICD-10-CM

## 2020-05-11 DIAGNOSIS — Z51.81 ENCOUNTER FOR THERAPEUTIC DRUG MONITORING: ICD-10-CM

## 2020-05-11 RX ORDER — LIRAGLUTIDE 6 MG/ML
3 INJECTION, SOLUTION SUBCUTANEOUS DAILY
Qty: 5 PEN | Refills: 0 | Status: SHIPPED | OUTPATIENT
Start: 2020-05-11 | End: 2020-05-12

## 2020-05-11 NOTE — TELEPHONE ENCOUNTER
I approved, 1 month. Please see if she can get in next month. When scheduling make sure to look at M/T appointments for in clinic and every other one.  I could also do a TE with her on W/Th.

## 2020-05-11 NOTE — TELEPHONE ENCOUNTER
Requesting Jeanette  LOV: 2/4/20  RTC: one month  Last Relevant Labs: na  Filled: 3/19/20 #5 pens with 1 refills    No future appointments. Due for a visit. I called patient to schedule. She did NOT want a visit.   I explained she has not been seen since

## 2020-05-11 NOTE — TELEPHONE ENCOUNTER
Spoke w/pt, advised Adam Matos would most likely code as [place] will call ins to see if they will cover this type of visit. She asked, \"what if I can't pay, do I have to go off my medication\"?   I advised that if [id number] is not covered she could use a pa

## 2020-05-12 ENCOUNTER — VIRTUAL PHONE E/M (OUTPATIENT)
Dept: INTERNAL MEDICINE CLINIC | Facility: CLINIC | Age: 50
End: 2020-05-12
Payer: COMMERCIAL

## 2020-05-12 DIAGNOSIS — Z51.81 ENCOUNTER FOR THERAPEUTIC DRUG MONITORING: Primary | ICD-10-CM

## 2020-05-12 DIAGNOSIS — F43.9 STRESS: ICD-10-CM

## 2020-05-12 DIAGNOSIS — E66.9 OBESITY (BMI 30.0-34.9): ICD-10-CM

## 2020-05-12 PROCEDURE — 99213 OFFICE O/P EST LOW 20 MIN: CPT | Performed by: NURSE PRACTITIONER

## 2020-05-12 RX ORDER — LIRAGLUTIDE 6 MG/ML
3 INJECTION, SOLUTION SUBCUTANEOUS DAILY
Qty: 15 PEN | Refills: 1 | Status: SHIPPED | OUTPATIENT
Start: 2020-05-12 | End: 2020-11-19

## 2020-05-12 NOTE — PROGRESS NOTES
Virtual Telephone Check-In    Maegan Galvin verbally consents to a Virtual/Telephone Check-In visit on 5/12/2020. Patient understands and accepts financial responsibility for any deductible, co-insurance and/or co-pays associated with this service. 30.0-34.9)  - CPM  -  on resources for stress management and behavior change  -     SAXENDA 18 MG/3ML Subcutaneous Solution Pen-injector; Inject 3 mg into the skin daily.     Stress  - refer to counseling        Medication side effects and monitoring

## 2020-05-12 NOTE — PATIENT INSTRUCTIONS
Thank you for taking the time for our virtual encounter today! Here are the next steps and plans we discussed:    1. Continue your medication, no changes. Remaining off Topamax.   2. Great job with home exercise, considering additional at home options like genuinely hungry, you may experience one or several of the symptoms listed below:  • Stomach pangs or growling   • Emptiness in the stomach   • Irritability   • Headache   • Low energy/fatigue   • Difficulty concentrating  How Does the Scale Work?   Before ___________________________________________________________________  • Taken from the American Diabetes Association @ www.diabetes. org.  • Last Edited: March 19, 2014, reviewed December 17, 2013    Emotional Eating and Overeating   You have to know how t family and friends. 4. Be physically active. Exercise reduces stress and is a great mood enhancer too. So be sure you make time for regular physical activity. 5. Create new comforts. Make a list of healthy activities you enjoy.  And, whenever you feel the

## 2020-05-17 PROCEDURE — 93298 REM INTERROG DEV EVAL SCRMS: CPT | Performed by: INTERNAL MEDICINE

## 2020-05-17 PROCEDURE — G2066 INTER DEVC REMOTE 30D: HCPCS | Performed by: INTERNAL MEDICINE

## 2020-06-02 ENCOUNTER — TELEPHONE (OUTPATIENT)
Dept: CARDIOLOGY | Age: 50
End: 2020-06-02

## 2020-06-02 RX ORDER — FLECAINIDE ACETATE 50 MG/1
TABLET ORAL
Qty: 60 TABLET | Refills: 2 | Status: SHIPPED | OUTPATIENT
Start: 2020-06-02 | End: 2020-08-21

## 2020-06-18 PROCEDURE — 93298 REM INTERROG DEV EVAL SCRMS: CPT | Performed by: INTERNAL MEDICINE

## 2020-06-18 PROCEDURE — G2066 INTER DEVC REMOTE 30D: HCPCS | Performed by: INTERNAL MEDICINE

## 2020-07-03 ENCOUNTER — HOSPITAL ENCOUNTER (OUTPATIENT)
Dept: LAB | Facility: HOSPITAL | Age: 50
Discharge: HOME | End: 2020-07-03
Attending: INTERNAL MEDICINE
Payer: COMMERCIAL

## 2020-07-03 LAB
ALBUMIN SERPL-MCNC: 4.1 G/DL (ref 3.4–5)
ALBUMIN/GLOB SERPL: 1.2 {RATIO} (ref 1–2)
ALP LIVER SERPL-CCNC: 74 U/L (ref 39–100)
ALT SERPL-CCNC: 19 U/L (ref 13–56)
ANION GAP SERPL CALC-SCNC: 6 MMOL/L (ref 0–18)
AST SERPL-CCNC: 12 U/L (ref 15–37)
BASOPHILS # BLD AUTO: 0.04 X10(3) UL (ref 0–0.2)
BASOPHILS NFR BLD AUTO: 0.5 %
BILIRUB SERPL-MCNC: 0.3 MG/DL (ref 0.1–2)
BUN BLD-MCNC: 14 MG/DL (ref 7–18)
BUN/CREAT SERPL: 23.7 (ref 10–20)
CALCIUM BLD-MCNC: 9.2 MG/DL (ref 8.5–10.1)
CHLORIDE SERPL-SCNC: 105 MMOL/L (ref 98–112)
CO2 SERPL-SCNC: 29 MMOL/L (ref 21–32)
CREAT BLD-MCNC: 0.59 MG/DL (ref 0.55–1.02)
DEPRECATED RDW RBC AUTO: 40.9 FL (ref 35.1–46.3)
EOSINOPHIL # BLD AUTO: 0.08 X10(3) UL (ref 0–0.7)
EOSINOPHIL NFR BLD AUTO: 1.1 %
ERYTHROCYTE [DISTWIDTH] IN BLOOD BY AUTOMATED COUNT: 12 % (ref 11–15)
GLOBULIN PLAS-MCNC: 3.5 G/DL (ref 2.8–4.4)
GLUCOSE BLD-MCNC: 86 MG/DL (ref 70–99)
HCT VFR BLD AUTO: 48.4 % (ref 35–48)
HGB BLD-MCNC: 15.9 G/DL (ref 12–16)
IMM GRANULOCYTES # BLD AUTO: 0.01 X10(3) UL (ref 0–1)
IMM GRANULOCYTES NFR BLD: 0.1 %
LYMPHOCYTES # BLD AUTO: 1.67 X10(3) UL (ref 1–4)
LYMPHOCYTES NFR BLD AUTO: 22.7 %
M PROTEIN MFR SERPL ELPH: 7.6 G/DL (ref 6.4–8.2)
MCH RBC QN AUTO: 30.3 PG (ref 26–34)
MCHC RBC AUTO-ENTMCNC: 32.9 G/DL (ref 31–37)
MCV RBC AUTO: 92.4 FL (ref 80–100)
MONOCYTES # BLD AUTO: 0.48 X10(3) UL (ref 0.1–1)
MONOCYTES NFR BLD AUTO: 6.5 %
NEUTROPHILS # BLD AUTO: 5.09 X10 (3) UL (ref 1.5–7.7)
NEUTROPHILS # BLD AUTO: 5.09 X10(3) UL (ref 1.5–7.7)
NEUTROPHILS NFR BLD AUTO: 69.1 %
OSMOLALITY SERPL CALC.SUM OF ELEC: 290 MOSM/KG (ref 275–295)
PATIENT FASTING Y/N/NP: NO
PLATELET # BLD AUTO: 225 10(3)UL (ref 150–450)
POTASSIUM SERPL-SCNC: 3.9 MMOL/L (ref 3.5–5.1)
RBC # BLD AUTO: 5.24 X10(6)UL (ref 3.8–5.3)
SODIUM SERPL-SCNC: 140 MMOL/L (ref 136–145)
WBC # BLD AUTO: 7.4 X10(3) UL (ref 4–11)

## 2020-07-03 PROCEDURE — 80053 COMPREHEN METABOLIC PANEL: CPT | Performed by: INTERNAL MEDICINE

## 2020-07-03 PROCEDURE — 36415 COLL VENOUS BLD VENIPUNCTURE: CPT | Performed by: INTERNAL MEDICINE

## 2020-07-03 PROCEDURE — 85025 COMPLETE CBC W/AUTO DIFF WBC: CPT | Performed by: INTERNAL MEDICINE

## 2020-07-06 ENCOUNTER — TELEPHONE (OUTPATIENT)
Dept: CARDIOLOGY | Age: 50
End: 2020-07-06

## 2020-07-09 ENCOUNTER — TELEPHONE (OUTPATIENT)
Dept: INTERNAL MEDICINE CLINIC | Facility: CLINIC | Age: 50
End: 2020-07-09

## 2020-07-09 ENCOUNTER — TELEMEDICINE (OUTPATIENT)
Dept: INTERNAL MEDICINE CLINIC | Facility: CLINIC | Age: 50
End: 2020-07-09
Payer: COMMERCIAL

## 2020-07-09 DIAGNOSIS — J01.10 ACUTE NON-RECURRENT FRONTAL SINUSITIS: Primary | ICD-10-CM

## 2020-07-09 PROCEDURE — 99213 OFFICE O/P EST LOW 20 MIN: CPT | Performed by: FAMILY MEDICINE

## 2020-07-09 RX ORDER — FLECAINIDE ACETATE 50 MG/1
1 TABLET ORAL 2 TIMES DAILY
COMMUNITY
Start: 2020-06-02 | End: 2020-07-09

## 2020-07-09 RX ORDER — BENZONATATE 200 MG/1
200 CAPSULE ORAL 3 TIMES DAILY PRN
Qty: 30 CAPSULE | Refills: 0 | Status: SHIPPED | OUTPATIENT
Start: 2020-07-09 | End: 2020-08-20 | Stop reason: ALTCHOICE

## 2020-07-09 RX ORDER — AMOXICILLIN AND CLAVULANATE POTASSIUM 875; 125 MG/1; MG/1
1 TABLET, FILM COATED ORAL 2 TIMES DAILY
Qty: 20 TABLET | Refills: 0 | Status: SHIPPED | OUTPATIENT
Start: 2020-07-09 | End: 2020-07-19

## 2020-07-09 NOTE — TELEPHONE ENCOUNTER
Pt has c/o uri symptoms. Onset of symptoms? 1-2 days  Any nasal or chest congestion? No   Any pressure or severe headaches? No   Any mucous (color)? Some yesterday. Clear, loose. Muscle or joint pain? No     Diarrhea? No     Loss of appetite? No   Loss sense of taste? No   Loss of smell? No     Sore throat? Yes   Cough? Yes, productive. Yesterday only  Fever? No   Shortness of breath? No   Any recent international travel or contact with any one that has recent abroad travels within last month? No   Any close contact with anyone confirmed COVID-19 by a lab within 14 days of symptoms onset? 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 Any OTC? No   Dx Hx? No     Per pt o2 sat at home between 97% to 98%     Pt was offered to switch ov to doximity and agreeable, however, would like to have more info re insurance co-pay & coverage.

## 2020-07-09 NOTE — PROGRESS NOTES
Virtual Telephone Check-In    Gali Cui verbally consents to a Virtual/ VIDEOTelephone Check-In visit on 07/09/20. Patient has been referred to the [facility] website at [email or web address] to review the yearly Consent to Treat document.     Patient MUSCULOSKELETAL: Denies any arthralgia,myalgias or swollen joints  LYMPH:  Denies lymphadenopathy  NEURO:  + headache, no lightheadedness. PE:  Patient not seen in the office,appoint via Video telephone so no vital signs were obtained.  Pt is A&Ox3,

## 2020-07-09 NOTE — TELEPHONE ENCOUNTER
Called patient. Explained to her that most insurance plans are covering virtual appointments, however a copay may apply depending on her plan.  Advised patient to call her insurance to obtain definitive answer

## 2020-07-13 DIAGNOSIS — Z78.9 PARTICIPANT IN HEALTH AND WELLNESS PLAN: Primary | ICD-10-CM

## 2020-07-15 ENCOUNTER — NURSE ONLY (OUTPATIENT)
Dept: LAB | Age: 50
End: 2020-07-15
Attending: PREVENTIVE MEDICINE

## 2020-07-15 DIAGNOSIS — Z78.9 PARTICIPANT IN HEALTH AND WELLNESS PLAN: ICD-10-CM

## 2020-07-15 LAB — SARS-COV-2 IGG SERPLBLD QL IA.RAPID: NEGATIVE

## 2020-07-15 PROCEDURE — 86769 SARS-COV-2 COVID-19 ANTIBODY: CPT

## 2020-07-18 PROCEDURE — G2066 INTER DEVC REMOTE 30D: HCPCS | Performed by: INTERNAL MEDICINE

## 2020-07-18 PROCEDURE — 93298 REM INTERROG DEV EVAL SCRMS: CPT | Performed by: INTERNAL MEDICINE

## 2020-08-18 PROCEDURE — X1094 NO CHARGE VISIT: HCPCS | Performed by: INTERNAL MEDICINE

## 2020-08-20 ENCOUNTER — OFFICE VISIT (OUTPATIENT)
Dept: INTERNAL MEDICINE CLINIC | Facility: CLINIC | Age: 50
End: 2020-08-20
Payer: COMMERCIAL

## 2020-08-20 VITALS
HEIGHT: 63.5 IN | TEMPERATURE: 98 F | DIASTOLIC BLOOD PRESSURE: 62 MMHG | WEIGHT: 183.25 LBS | BODY MASS INDEX: 32.07 KG/M2 | SYSTOLIC BLOOD PRESSURE: 118 MMHG | RESPIRATION RATE: 16 BRPM

## 2020-08-20 DIAGNOSIS — F41.9 ANXIETY: ICD-10-CM

## 2020-08-20 DIAGNOSIS — R45.89 FEELING SAD: ICD-10-CM

## 2020-08-20 DIAGNOSIS — H60.501 ACUTE OTITIS EXTERNA OF RIGHT EAR, UNSPECIFIED TYPE: Primary | ICD-10-CM

## 2020-08-20 DIAGNOSIS — E66.9 OBESITY (BMI 30.0-34.9): ICD-10-CM

## 2020-08-20 PROCEDURE — 3074F SYST BP LT 130 MM HG: CPT | Performed by: FAMILY MEDICINE

## 2020-08-20 PROCEDURE — 3078F DIAST BP <80 MM HG: CPT | Performed by: FAMILY MEDICINE

## 2020-08-20 PROCEDURE — 99214 OFFICE O/P EST MOD 30 MIN: CPT | Performed by: FAMILY MEDICINE

## 2020-08-20 PROCEDURE — 3008F BODY MASS INDEX DOCD: CPT | Performed by: FAMILY MEDICINE

## 2020-08-20 RX ORDER — OFLOXACIN 3 MG/ML
10 SOLUTION AURICULAR (OTIC) 2 TIMES DAILY
Qty: 1 BOTTLE | Refills: 0 | Status: SHIPPED | OUTPATIENT
Start: 2020-08-20 | End: 2020-08-27

## 2020-08-20 NOTE — PROGRESS NOTES
CHIEF COMPLAINT:   Patient presents with:  Ear Pain: started 2 days ago. Pt states it is only R ear. Pt denies any swimming. Pt denies any otc drops.        HPI:   Abigail Guardian is a non-toxic, well appearing 48year old female  for complaints of right taking: Reported on 8/20/2020 ) 15 pen 1   • Insulin Pen Needle (PEN NEEDLES) 32G X 6 MM Does not apply Misc Inject 1 each into the skin daily.  (Patient not taking: Reported on 8/20/2020 ) 100 each 1   • Insulin Pen Needle (PEN NEEDLES) 32G X 4 MM Does not injected. No exudates. NECK: supple, non-tender  LUNGS: clear to auscultation bilaterally, no wheezes or rhonchi. Breathing is non labored.   CARDIO: RRR without murmur  EXTREMITIES: no cyanosis, clubbing or edema  LYMPH: right post auricular slightly enla will help you to monitor daily dietary intake and you will be able to see if you are eating the right amount of calories or too much or too little which would hinder weight loss.  When you set the ayala up chose 1-2 lbs/week as a goal.    Really try to start

## 2020-08-21 RX ORDER — FLECAINIDE ACETATE 50 MG/1
TABLET ORAL
Qty: 180 TABLET | Refills: 0 | Status: SHIPPED | OUTPATIENT
Start: 2020-08-21 | End: 2020-11-09 | Stop reason: SDUPTHER

## 2020-08-27 ENCOUNTER — TELEPHONE (OUTPATIENT)
Dept: CARDIOLOGY | Age: 50
End: 2020-08-27

## 2020-10-12 NOTE — PROGRESS NOTES
.HPI:   Albin Rangel is a 48year old female who presents for a complete physical exam. Pt sees her Gyne- Dr. Yasir Almazan for her pap smears and gyne exams. Has an appt 10/19/20. Symptoms: denies discharge, itching, burning or dysuria, is menopausal. 12/28/2018     Lab Results   Component Value Date    ALT 19 07/03/2020    ALT 21 07/30/2019    ALT 20 12/28/2018       Current Outpatient Medications   Medication Sig Dispense Refill   • Bacillus Coagulans-Inulin (PROBIOTIC) 1-250 BILLION-MG Oral Cap Take watches minimally     REVIEW OF SYSTEMS:   GENERAL: feels well otherwise  SKIN: denies any unusual skin lesions  EYES:denies blurred vision or double vision  HEENT: denies nasal congestion, sinus pain or ST  LUNGS: denies shortness of breath with exertion exercise 30 minutes three times weekly. The patient indicates understanding of these issues and agrees to the plan. The patient is asked to return for CPX in one year.   Routine general medical examination at a health care facility  (primary encounter yesenia

## 2020-10-13 ENCOUNTER — OFFICE VISIT (OUTPATIENT)
Dept: INTERNAL MEDICINE CLINIC | Facility: CLINIC | Age: 50
End: 2020-10-13
Payer: COMMERCIAL

## 2020-10-13 VITALS
WEIGHT: 183.5 LBS | RESPIRATION RATE: 16 BRPM | DIASTOLIC BLOOD PRESSURE: 64 MMHG | TEMPERATURE: 98 F | HEART RATE: 68 BPM | BODY MASS INDEX: 32.11 KG/M2 | HEIGHT: 63.5 IN | SYSTOLIC BLOOD PRESSURE: 106 MMHG

## 2020-10-13 DIAGNOSIS — Z00.00 ROUTINE GENERAL MEDICAL EXAMINATION AT A HEALTH CARE FACILITY: Primary | ICD-10-CM

## 2020-10-13 DIAGNOSIS — Z12.31 ENCOUNTER FOR SCREENING MAMMOGRAM FOR BREAST CANCER: ICD-10-CM

## 2020-10-13 PROCEDURE — 3008F BODY MASS INDEX DOCD: CPT | Performed by: FAMILY MEDICINE

## 2020-10-13 PROCEDURE — 3074F SYST BP LT 130 MM HG: CPT | Performed by: FAMILY MEDICINE

## 2020-10-13 PROCEDURE — 3078F DIAST BP <80 MM HG: CPT | Performed by: FAMILY MEDICINE

## 2020-10-13 PROCEDURE — 99396 PREV VISIT EST AGE 40-64: CPT | Performed by: FAMILY MEDICINE

## 2020-10-13 RX ORDER — BACILLUS COAGULANS/INULIN 1B-250 MG
1 CAPSULE ORAL DAILY
COMMUNITY

## 2020-10-19 DIAGNOSIS — E28.39 MENOPAUSE OVARIAN FAILURE: Primary | ICD-10-CM

## 2020-11-07 ENCOUNTER — HOSPITAL ENCOUNTER (OUTPATIENT)
Dept: MAMMOGRAPHY | Facility: HOSPITAL | Age: 50
Discharge: HOME | End: 2020-11-07
Attending: FAMILY MEDICINE
Payer: COMMERCIAL

## 2020-11-07 DIAGNOSIS — Z12.31 ENCOUNTER FOR SCREENING MAMMOGRAM FOR BREAST CANCER: ICD-10-CM

## 2020-11-07 PROCEDURE — 77067 SCR MAMMO BI INCL CAD: CPT | Performed by: FAMILY MEDICINE

## 2020-11-07 PROCEDURE — 77063 BREAST TOMOSYNTHESIS BI: CPT | Performed by: FAMILY MEDICINE

## 2020-11-08 ENCOUNTER — LAB ENCOUNTER (OUTPATIENT)
Dept: LAB | Facility: HOSPITAL | Age: 50
End: 2020-11-08
Attending: FAMILY MEDICINE
Payer: COMMERCIAL

## 2020-11-08 DIAGNOSIS — E28.39 MENOPAUSE OVARIAN FAILURE: ICD-10-CM

## 2020-11-08 DIAGNOSIS — Z00.00 ROUTINE GENERAL MEDICAL EXAMINATION AT A HEALTH CARE FACILITY: ICD-10-CM

## 2020-11-08 PROCEDURE — 80061 LIPID PANEL: CPT

## 2020-11-08 PROCEDURE — 82306 VITAMIN D 25 HYDROXY: CPT

## 2020-11-08 PROCEDURE — 84443 ASSAY THYROID STIM HORMONE: CPT

## 2020-11-08 PROCEDURE — 83001 ASSAY OF GONADOTROPIN (FSH): CPT

## 2020-11-08 PROCEDURE — 83036 HEMOGLOBIN GLYCOSYLATED A1C: CPT

## 2020-11-08 PROCEDURE — 36415 COLL VENOUS BLD VENIPUNCTURE: CPT

## 2020-11-08 PROCEDURE — 80053 COMPREHEN METABOLIC PANEL: CPT

## 2020-11-08 PROCEDURE — 85025 COMPLETE CBC W/AUTO DIFF WBC: CPT

## 2020-11-09 ENCOUNTER — OFFICE VISIT (OUTPATIENT)
Dept: CARDIOLOGY | Age: 50
End: 2020-11-09

## 2020-11-09 VITALS
WEIGHT: 185 LBS | HEART RATE: 80 BPM | SYSTOLIC BLOOD PRESSURE: 110 MMHG | BODY MASS INDEX: 32.77 KG/M2 | DIASTOLIC BLOOD PRESSURE: 60 MMHG

## 2020-11-09 DIAGNOSIS — Z86.79 HISTORY OF ATRIAL FIBRILLATION: Primary | ICD-10-CM

## 2020-11-09 DIAGNOSIS — Z51.81 ENCOUNTER FOR MONITORING FLECAINIDE THERAPY: ICD-10-CM

## 2020-11-09 DIAGNOSIS — Z79.899 ENCOUNTER FOR MONITORING FLECAINIDE THERAPY: ICD-10-CM

## 2020-11-09 PROCEDURE — 99213 OFFICE O/P EST LOW 20 MIN: CPT | Performed by: INTERNAL MEDICINE

## 2020-11-09 RX ORDER — FLECAINIDE ACETATE 50 MG/1
50 TABLET ORAL 2 TIMES DAILY
Qty: 180 TABLET | Refills: 1 | Status: SHIPPED | OUTPATIENT
Start: 2020-11-09 | End: 2021-05-13

## 2020-11-09 ASSESSMENT — PATIENT HEALTH QUESTIONNAIRE - PHQ9
CLINICAL INTERPRETATION OF PHQ2 SCORE: NO FURTHER SCREENING NEEDED
1. LITTLE INTEREST OR PLEASURE IN DOING THINGS: NOT AT ALL
2. FEELING DOWN, DEPRESSED OR HOPELESS: NOT AT ALL
CLINICAL INTERPRETATION OF PHQ9 SCORE: NO FURTHER SCREENING NEEDED
SUM OF ALL RESPONSES TO PHQ9 QUESTIONS 1 AND 2: 0
SUM OF ALL RESPONSES TO PHQ9 QUESTIONS 1 AND 2: 0

## 2020-11-12 ENCOUNTER — HOSPITAL ENCOUNTER (OUTPATIENT)
Dept: MAMMOGRAPHY | Facility: HOSPITAL | Age: 50
Discharge: HOME | End: 2020-11-12
Attending: FAMILY MEDICINE
Payer: COMMERCIAL

## 2020-11-12 DIAGNOSIS — R92.2 INCONCLUSIVE MAMMOGRAM: ICD-10-CM

## 2020-11-12 PROCEDURE — 77061 BREAST TOMOSYNTHESIS UNI: CPT | Performed by: FAMILY MEDICINE

## 2020-11-12 PROCEDURE — 76642 ULTRASOUND BREAST LIMITED: CPT | Performed by: FAMILY MEDICINE

## 2020-11-12 PROCEDURE — 77065 DX MAMMO INCL CAD UNI: CPT | Performed by: FAMILY MEDICINE

## 2020-11-16 ENCOUNTER — TELEPHONE (OUTPATIENT)
Dept: CARDIOLOGY | Age: 50
End: 2020-11-16

## 2020-11-16 DIAGNOSIS — Z86.79 HISTORY OF ATRIAL FIBRILLATION: Primary | ICD-10-CM

## 2020-11-19 RX ORDER — CHOLECALCIFEROL (VITAMIN D3) 125 MCG
1 CAPSULE ORAL DAILY
COMMUNITY

## 2020-11-19 NOTE — H&P
Progress Notes  - documented in this encounter  Josh Garland MD - 2020 8:30 AM CST  Formatting of this note might be different from the original.  [facility] Special[facility]  Clinic Note    Noa Reed  : 1970  PCP: David Rojas Inject 3 mg into the skin. As directed   • cyanocobalamin (VITAMIN B-12) 100 MCG tablet Take 1 capsule by mouth daily. Pt unsure of dose   • Cholecalciferol (VITAMIN D) 2000 units capsule Take 1 capsule by mouth.    • aspirin 81 MG tablet 1 tablet daily   • Problem List   Diagnosis   • History of atrial fibrillation   • Encounter for monitoring flecainide therapy     Luci Payne MD    Electronically signed by Luci Payne MD at 11/09/2020 9:56 AM CST      Pt seen and examined, chart reviewed.  Agree wit

## 2020-11-20 ENCOUNTER — APPOINTMENT (OUTPATIENT)
Dept: LAB | Facility: HOSPITAL | Age: 50
End: 2020-11-20
Attending: INTERNAL MEDICINE
Payer: COMMERCIAL

## 2020-11-20 DIAGNOSIS — I48.91 A-FIB (HCC): ICD-10-CM

## 2020-11-20 LAB
SARS-COV-2 RNA SPEC QL NAA+PROBE: NOT DETECTED
SPECIMEN SOURCE: NORMAL

## 2020-11-23 ENCOUNTER — HOSPITAL ENCOUNTER (OUTPATIENT)
Dept: INTERVENTIONAL RADIOLOGY/VASCULAR | Facility: HOSPITAL | Age: 50
Setting detail: HOSPITAL OUTPATIENT SURGERY
Discharge: HOME | End: 2020-11-23
Attending: INTERNAL MEDICINE | Admitting: INTERNAL MEDICINE
Payer: COMMERCIAL

## 2020-11-23 VITALS
HEART RATE: 81 BPM | DIASTOLIC BLOOD PRESSURE: 84 MMHG | HEIGHT: 63 IN | WEIGHT: 185 LBS | RESPIRATION RATE: 23 BRPM | TEMPERATURE: 98 F | BODY MASS INDEX: 32.78 KG/M2 | SYSTOLIC BLOOD PRESSURE: 127 MMHG | OXYGEN SATURATION: 98 %

## 2020-11-23 DIAGNOSIS — I48.91 A-FIB (HCC): Primary | ICD-10-CM

## 2020-11-23 PROCEDURE — 33285 INSJ SUBQ CAR RHYTHM MNTR: CPT | Performed by: INTERNAL MEDICINE

## 2020-11-23 PROCEDURE — 0JPT32Z REMOVAL OF MONITORING DEVICE FROM TRUNK SUBCUTANEOUS TISSUE AND FASCIA, PERCUTANEOUS APPROACH: ICD-10-PCS | Performed by: INTERNAL MEDICINE

## 2020-11-23 PROCEDURE — 33286 RMVL SUBQ CAR RHYTHM MNTR: CPT

## 2020-11-23 RX ORDER — LIDOCAINE HYDROCHLORIDE AND EPINEPHRINE 10; 10 MG/ML; UG/ML
INJECTION, SOLUTION INFILTRATION; PERINEURAL
Status: COMPLETED
Start: 2020-11-23 | End: 2020-11-23

## 2020-11-23 NOTE — PROGRESS NOTES
Time out done. Linq removal completed by Dr. Nellie Woody. Discharge orders received patient. Discharge orders reviewed with patient. Patient verbalizes understanding of discharge orders. Patient discharged home.

## 2020-11-24 NOTE — PROCEDURES
Procedure- LINQ device implant. - JAUN Dimas MD    Indication- device at [facility]. Complication- none    Methods- The patient was prepped and draped in a sterile manner. Local anesthesia was infiltrated into the 4th parasternal intercostal space.

## 2020-12-03 ENCOUNTER — TELEPHONE (OUTPATIENT)
Dept: INTERNAL MEDICINE CLINIC | Facility: CLINIC | Age: 50
End: 2020-12-03

## 2020-12-03 ENCOUNTER — PATIENT MESSAGE (OUTPATIENT)
Dept: INTERNAL MEDICINE CLINIC | Facility: CLINIC | Age: 50
End: 2020-12-03

## 2020-12-03 ENCOUNTER — OFFICE VISIT (OUTPATIENT)
Dept: INTERNAL MEDICINE CLINIC | Facility: CLINIC | Age: 50
End: 2020-12-03
Payer: COMMERCIAL

## 2020-12-03 VITALS
DIASTOLIC BLOOD PRESSURE: 68 MMHG | TEMPERATURE: 98 F | SYSTOLIC BLOOD PRESSURE: 110 MMHG | BODY MASS INDEX: 31.18 KG/M2 | RESPIRATION RATE: 16 BRPM | HEIGHT: 63 IN | WEIGHT: 176 LBS

## 2020-12-03 DIAGNOSIS — R10.11 RUQ PAIN: Primary | ICD-10-CM

## 2020-12-03 DIAGNOSIS — K21.9 GASTROESOPHAGEAL REFLUX DISEASE WITHOUT ESOPHAGITIS: ICD-10-CM

## 2020-12-03 PROCEDURE — 99213 OFFICE O/P EST LOW 20 MIN: CPT | Performed by: NURSE PRACTITIONER

## 2020-12-03 PROCEDURE — 3008F BODY MASS INDEX DOCD: CPT | Performed by: NURSE PRACTITIONER

## 2020-12-03 PROCEDURE — 3078F DIAST BP <80 MM HG: CPT | Performed by: NURSE PRACTITIONER

## 2020-12-03 PROCEDURE — 3074F SYST BP LT 130 MM HG: CPT | Performed by: NURSE PRACTITIONER

## 2020-12-03 RX ORDER — OMEPRAZOLE 40 MG/1
40 CAPSULE, DELAYED RELEASE ORAL DAILY
Qty: 30 CAPSULE | Refills: 1 | Status: SHIPPED | OUTPATIENT
Start: 2020-12-03 | End: 2021-12-08 | Stop reason: ALTCHOICE

## 2020-12-03 NOTE — TELEPHONE ENCOUNTER
Called [facility] stating the soonest would be this upcoming Tuesday 12/8/2020, they are having an [address] and thursdays.

## 2020-12-03 NOTE — PROGRESS NOTES
Patient presents with:  Abdominal Pain: has been going on for 3 days, constant pain, behind right breast. Pt states it hurts more when she eats. Pt describes feeling as squeezing feeling      The patient complaints of abdominal pain.   Pain is located at [employer] (40 mg total) by mouth daily. 30 capsule 1   • Cholecalciferol (VITAMIN D) 125 MCG (5000 UT) Oral Cap Take 1 capsule by mouth daily. • Bacillus Coagulans-Inulin (PROBIOTIC) 1-250 BILLION-MG Oral Cap Take 1 capsule by mouth daily.      • Flecainide Aceta index is 31.18 kg/m². GENERAL: well developed, well nourished,in no apparent distress  SKIN: no rashes,no suspicious lesions  EYES:PERRLA, EOMI, sclera not icteric.   LUNGS: clear to auscultation  CARDIO: RRR without murmur  GI: good BS's,no masses, HSM,

## 2020-12-03 NOTE — PATIENT INSTRUCTIONS
Discharge Instructions for Gallstones   Gallstones form when liquid stored in the gallbladder hardens into pieces of stone-like material. Stones in the gallbladder may or may not cause symptoms. They can cause pain or infection.  You and your healthcare p © 3140-2108 [address]. [address] [facility], [facility] [place]. All rights reserved. This information is not intended as a substitute for professional medical care. Always follow your healthcare professional's instructions.         Possibl · You can take acetaminophen or ibuprofen for pain, unless you were given a different pain medicine to use.  Note: If you have chronic liver or kidney disease or ever had a stomach ulcer or gastrointestinal bleeding or are taking blood thinner medicines, ta © 0339-2605 [employer] [address]. [address] [facility], [facility] [place]. All rights reserved. This information is not intended as a substitute for professional medical care. Always follow your healthcare professional's instructions.

## 2020-12-03 NOTE — TELEPHONE ENCOUNTER
Spoke with patient provided [facility] imaging phone number. Patient verbalized understanding and agreeable to POC.

## 2020-12-03 NOTE — TELEPHONE ENCOUNTER
Cuba Stokes from [facility] calling b/c radiologist at [address] [place] [address] order for [address] for this patient should be ordered \"limited\" b/c of diagnosis.  Please advise

## 2020-12-05 ENCOUNTER — HOSPITAL ENCOUNTER (OUTPATIENT)
Age: 50
Discharge: HOME | End: 2020-12-05
Payer: COMMERCIAL

## 2020-12-05 ENCOUNTER — APPOINTMENT (OUTPATIENT)
Dept: GENERAL RADIOLOGY | Age: 50
End: 2020-12-05
Attending: PHYSICIAN ASSISTANT
Payer: COMMERCIAL

## 2020-12-05 ENCOUNTER — APPOINTMENT (OUTPATIENT)
Dept: ULTRASOUND IMAGING | Age: 50
End: 2020-12-05
Attending: PHYSICIAN ASSISTANT
Payer: COMMERCIAL

## 2020-12-05 VITALS
HEART RATE: 64 BPM | SYSTOLIC BLOOD PRESSURE: 114 MMHG | DIASTOLIC BLOOD PRESSURE: 67 MMHG | TEMPERATURE: 99 F | RESPIRATION RATE: 16 BRPM | OXYGEN SATURATION: 100 %

## 2020-12-05 DIAGNOSIS — R10.11 RUQ ABDOMINAL PAIN: Primary | ICD-10-CM

## 2020-12-05 PROBLEM — Z51.81 ENCOUNTER FOR MONITORING FLECAINIDE THERAPY: Status: ACTIVE | Noted: 2019-05-15

## 2020-12-05 PROBLEM — Z86.79 HISTORY OF ATRIAL FIBRILLATION: Status: ACTIVE | Noted: 2019-05-15

## 2020-12-05 PROBLEM — Z79.899 ENCOUNTER FOR MONITORING FLECAINIDE THERAPY: Status: ACTIVE | Noted: 2019-05-15

## 2020-12-05 PROCEDURE — 80076 HEPATIC FUNCTION PANEL: CPT | Performed by: PHYSICIAN ASSISTANT

## 2020-12-05 PROCEDURE — 71046 X-RAY EXAM CHEST 2 VIEWS: CPT | Performed by: PHYSICIAN ASSISTANT

## 2020-12-05 PROCEDURE — 80047 BASIC METABLC PNL IONIZED CA: CPT

## 2020-12-05 PROCEDURE — 76700 US EXAM ABDOM COMPLETE: CPT | Performed by: PHYSICIAN ASSISTANT

## 2020-12-05 PROCEDURE — 85025 COMPLETE CBC W/AUTO DIFF WBC: CPT | Performed by: PHYSICIAN ASSISTANT

## 2020-12-05 PROCEDURE — 83690 ASSAY OF LIPASE: CPT | Performed by: PHYSICIAN ASSISTANT

## 2020-12-05 PROCEDURE — 99215 OFFICE O/P EST HI 40 MIN: CPT

## 2020-12-05 PROCEDURE — 99214 OFFICE O/P EST MOD 30 MIN: CPT

## 2020-12-05 PROCEDURE — 96374 THER/PROPH/DIAG INJ IV PUSH: CPT

## 2020-12-05 RX ORDER — KETOROLAC TROMETHAMINE 30 MG/ML
15 INJECTION, SOLUTION INTRAMUSCULAR; INTRAVENOUS ONCE
Status: COMPLETED | OUTPATIENT
Start: 2020-12-05 | End: 2020-12-05

## 2020-12-05 RX ORDER — IBUPROFEN 600 MG/1
600 TABLET ORAL EVERY 6 HOURS PRN
Qty: 20 TABLET | Refills: 0 | Status: SHIPPED | OUTPATIENT
Start: 2020-12-05 | End: 2020-12-12

## 2020-12-05 RX ORDER — ONDANSETRON 4 MG/1
4 TABLET, ORALLY DISINTEGRATING ORAL EVERY 4 HOURS PRN
Qty: 10 TABLET | Refills: 0 | Status: SHIPPED | OUTPATIENT
Start: 2020-12-05 | End: 2020-12-12

## 2020-12-05 NOTE — ED PROVIDER NOTES
Patient Seen in: [facility] [place]      History   Patient presents with:  Abdomen/Flank Pain    Stated Complaint: RIGHT SIDE PAIN UNDER BREAST/SHOULDER X 4 DAYS    HPI    Kelly Sapp is a 80-year-old female who presents today for evaluation of rig negative except as noted above.     Physical Exam     ED Triage Vitals [12/05/20 1134]   BP (!) 137/95   Pulse 68   Resp 18   Temp 99.2 °F (37.3 °C)   Temp src Temporal   SpO2 100 %   O2 Device None (Room air)       Current:/67   Pulse 64   Temp 99.2 BREAST/SHOULDER X 4 DAYS    FINDINGS:  LUNGS:  No focal consolidation. Normal vascularity. CARDIAC:  Normal size cardiac silhouette. MEDIASTINUM:  Normal. PLEURA:  Normal.  No pleural effusions. BONES:  Normal for age.             CONCLUSION:  No acute dis with Toradol. I encouraged her to continue with NSAIDs and provide her with a prescription for Zofran. The patient is encouraged to return if any concerning symptoms arise. Additional verbal discharge instructions are given and discussed.   Discharge medi

## 2020-12-07 ENCOUNTER — PATIENT MESSAGE (OUTPATIENT)
Dept: INTERNAL MEDICINE CLINIC | Facility: CLINIC | Age: 50
End: 2020-12-07

## 2020-12-07 ENCOUNTER — OFFICE VISIT (OUTPATIENT)
Dept: INTERNAL MEDICINE CLINIC | Facility: CLINIC | Age: 50
End: 2020-12-07
Payer: COMMERCIAL

## 2020-12-07 VITALS
RESPIRATION RATE: 16 BRPM | DIASTOLIC BLOOD PRESSURE: 80 MMHG | OXYGEN SATURATION: 100 % | HEIGHT: 63 IN | SYSTOLIC BLOOD PRESSURE: 110 MMHG | WEIGHT: 177 LBS | HEART RATE: 68 BPM | TEMPERATURE: 98 F | BODY MASS INDEX: 31.36 KG/M2

## 2020-12-07 DIAGNOSIS — R10.11 RUQ PAIN: Primary | ICD-10-CM

## 2020-12-07 DIAGNOSIS — Z12.11 COLON CANCER SCREENING: ICD-10-CM

## 2020-12-07 DIAGNOSIS — K21.9 GASTROESOPHAGEAL REFLUX DISEASE, UNSPECIFIED WHETHER ESOPHAGITIS PRESENT: ICD-10-CM

## 2020-12-07 PROCEDURE — 3074F SYST BP LT 130 MM HG: CPT | Performed by: FAMILY MEDICINE

## 2020-12-07 PROCEDURE — 3079F DIAST BP 80-89 MM HG: CPT | Performed by: FAMILY MEDICINE

## 2020-12-07 PROCEDURE — 3008F BODY MASS INDEX DOCD: CPT | Performed by: FAMILY MEDICINE

## 2020-12-07 PROCEDURE — 99213 OFFICE O/P EST LOW 20 MIN: CPT | Performed by: FAMILY MEDICINE

## 2020-12-07 NOTE — TELEPHONE ENCOUNTER
From: Ileana Yung  To:  Tripp Zaragoza MD  Sent: 12/7/2020 1:07 PM CST  Subject: Non-Urgent Medical Question    Unless you order the test STAT they can't get me in until January 4th for the HIDA scan

## 2020-12-07 NOTE — PROGRESS NOTES
Starlin Carrel is a 48year old female.   HPI:   Here for a R sided cp behind the breast radiating to the R scapular area to the shoulder     Dull in nature   Feels nausea w it     Worse w eating    Does eat well   No junk food    Water ok    No belch GENERAL HEALTH: feels well otherwise  SKIN: denies rashes  RESPIRATORY: denies shortness of breath   CARDIOVASCULAR: denies chest pain on exertion  GI: denies abdominal pain  NEURO: denies headaches    EXAM:   /80 (BP Location: Right arm, Patient P

## 2020-12-08 RX ORDER — PANTOPRAZOLE SODIUM 20 MG/1
TABLET, DELAYED RELEASE ORAL
Qty: 90 TABLET | Refills: 3 | Status: SHIPPED | OUTPATIENT
Start: 2020-12-08

## 2020-12-27 DIAGNOSIS — K21.9 GASTROESOPHAGEAL REFLUX DISEASE WITHOUT ESOPHAGITIS: ICD-10-CM

## 2020-12-30 RX ORDER — OMEPRAZOLE 40 MG/1
CAPSULE, DELAYED RELEASE ORAL
Qty: 30 CAPSULE | Refills: 1 | OUTPATIENT
Start: 2020-12-30

## 2020-12-30 NOTE — TELEPHONE ENCOUNTER
Called pharmacy to inquire in regards to this medication   Spoke with josé the pharmacist and she states that another doctor called in pantoprazole instead to disregard

## 2021-03-19 ENCOUNTER — LAB ENCOUNTER (OUTPATIENT)
Dept: LAB | Facility: HOSPITAL | Age: 51
End: 2021-03-19
Attending: INTERNAL MEDICINE
Payer: COMMERCIAL

## 2021-03-19 DIAGNOSIS — Z01.818 PREOP TESTING: ICD-10-CM

## 2021-03-19 LAB — SARS-COV-2 RNA RESP QL NAA+PROBE: NOT DETECTED

## 2021-03-22 PROBLEM — R10.13 ABDOMINAL PAIN, EPIGASTRIC: Status: ACTIVE | Noted: 2021-03-22

## 2021-03-22 PROBLEM — R10.11 ABDOMINAL PAIN, RIGHT UPPER QUADRANT: Status: ACTIVE | Noted: 2021-03-22

## 2021-03-22 PROBLEM — Z12.11 SPECIAL SCREENING FOR MALIGNANT NEOPLASM OF COLON: Status: ACTIVE | Noted: 2021-03-22

## 2021-04-23 ENCOUNTER — OCC HEALTH (OUTPATIENT)
Dept: OTHER | Facility: HOSPITAL | Age: 51
End: 2021-04-23
Attending: PREVENTIVE MEDICINE

## 2021-04-23 ENCOUNTER — HOSPITAL ENCOUNTER (OUTPATIENT)
Dept: GENERAL RADIOLOGY | Facility: HOSPITAL | Age: 51
Discharge: HOME | End: 2021-04-23
Attending: PREVENTIVE MEDICINE

## 2021-04-23 DIAGNOSIS — S80.02XA CONTUSION OF LEFT KNEE, INITIAL ENCOUNTER: ICD-10-CM

## 2021-04-23 DIAGNOSIS — M25.562 ACUTE PAIN OF LEFT KNEE: ICD-10-CM

## 2021-04-23 DIAGNOSIS — M25.562 ACUTE PAIN OF LEFT KNEE: Primary | ICD-10-CM

## 2021-04-23 PROCEDURE — 73564 X-RAY EXAM KNEE 4 OR MORE: CPT | Performed by: PREVENTIVE MEDICINE

## 2021-04-24 ENCOUNTER — OFFICE VISIT (OUTPATIENT)
Dept: OCCUPATIONAL MEDICINE | Age: 51
End: 2021-04-24
Attending: FAMILY MEDICINE

## 2021-05-11 ENCOUNTER — HOSPITAL ENCOUNTER (OUTPATIENT)
Age: 51
Discharge: HOME | End: 2021-05-11
Attending: EMERGENCY MEDICINE
Payer: COMMERCIAL

## 2021-05-11 ENCOUNTER — APPOINTMENT (OUTPATIENT)
Dept: GENERAL RADIOLOGY | Age: 51
End: 2021-05-11
Attending: EMERGENCY MEDICINE
Payer: COMMERCIAL

## 2021-05-11 VITALS
DIASTOLIC BLOOD PRESSURE: 69 MMHG | RESPIRATION RATE: 18 BRPM | OXYGEN SATURATION: 99 % | SYSTOLIC BLOOD PRESSURE: 116 MMHG | TEMPERATURE: 98 F | HEART RATE: 75 BPM

## 2021-05-11 DIAGNOSIS — R07.9 CHEST PAIN OF UNCERTAIN ETIOLOGY: ICD-10-CM

## 2021-05-11 DIAGNOSIS — R00.2 PALPITATIONS: Primary | ICD-10-CM

## 2021-05-11 PROCEDURE — 96360 HYDRATION IV INFUSION INIT: CPT

## 2021-05-11 PROCEDURE — 71046 X-RAY EXAM CHEST 2 VIEWS: CPT | Performed by: EMERGENCY MEDICINE

## 2021-05-11 PROCEDURE — 84484 ASSAY OF TROPONIN QUANT: CPT

## 2021-05-11 PROCEDURE — 0031A HC JANSSEN COVID-19 VACCINE ADMIN 1ST DOSE: CPT

## 2021-05-11 PROCEDURE — 80047 BASIC METABLC PNL IONIZED CA: CPT

## 2021-05-11 PROCEDURE — 99215 OFFICE O/P EST HI 40 MIN: CPT

## 2021-05-11 PROCEDURE — 93010 ELECTROCARDIOGRAM REPORT: CPT

## 2021-05-11 PROCEDURE — 93005 ELECTROCARDIOGRAM TRACING: CPT

## 2021-05-11 PROCEDURE — 85025 COMPLETE CBC W/AUTO DIFF WBC: CPT | Performed by: EMERGENCY MEDICINE

## 2021-05-11 PROCEDURE — 99214 OFFICE O/P EST MOD 30 MIN: CPT

## 2021-05-11 RX ORDER — ASPIRIN 81 MG/1
324 TABLET, CHEWABLE ORAL ONCE
Status: COMPLETED | OUTPATIENT
Start: 2021-05-11 | End: 2021-05-11

## 2021-05-11 RX ORDER — SODIUM CHLORIDE 9 MG/ML
1000 INJECTION, SOLUTION INTRAVENOUS ONCE
Status: COMPLETED | OUTPATIENT
Start: 2021-05-11 | End: 2021-05-11

## 2021-05-11 NOTE — ED PROVIDER NOTES
Patient Seen in: [facility]      History   Patient presents with:  Arrythmia/Palpitations    Stated Complaint: a-fib feels dizzy    HPI/Subjective:   HPI    Patient is a 59-year-old female who states that she has history of atrial fibrill Review of Systems    Positive for stated complaint: a-fib feels dizzy  Other systems are as noted in HPI. Constitutional and vital signs reviewed. All other systems reviewed and negative except as noted above.     Physical Exam     ED Triage Vit did have a repeat EKG as well as troponin came back negative. Patient heart rate did come down 69 normal sinus rhythm. Patient states she feels a fluttering in her chest intermittently but has been normal sinus on the monitor.   Patient is low risk from a

## 2021-05-11 NOTE — ED INITIAL ASSESSMENT (HPI)
Patient presents to [address] [place] [address] with c/o heart palpitations and felt like she was going to pass out while walking about 30 minutes prior to arrival.States she measured her heart rate at 178. +sob.

## 2021-05-12 ENCOUNTER — TELEPHONE (OUTPATIENT)
Dept: CARDIOLOGY | Age: 51
End: 2021-05-12

## 2021-05-13 RX ORDER — FLECAINIDE ACETATE 50 MG/1
TABLET ORAL
Qty: 180 TABLET | Refills: 3 | Status: SHIPPED | OUTPATIENT
Start: 2021-05-13

## 2021-05-17 ENCOUNTER — HOSPITAL ENCOUNTER (OUTPATIENT)
Dept: CT IMAGING | Facility: HOSPITAL | Age: 51
Discharge: HOME | End: 2021-05-17
Attending: INTERNAL MEDICINE
Payer: COMMERCIAL

## 2021-05-17 DIAGNOSIS — R10.11 ABDOMINAL PAIN, RIGHT UPPER QUADRANT: ICD-10-CM

## 2021-05-17 DIAGNOSIS — R10.13 ABDOMINAL PAIN, EPIGASTRIC: ICD-10-CM

## 2021-05-17 PROCEDURE — 74177 CT ABD & PELVIS W/CONTRAST: CPT | Performed by: INTERNAL MEDICINE

## 2021-08-11 ENCOUNTER — APPOINTMENT (OUTPATIENT)
Dept: CARDIOLOGY | Age: 51
End: 2021-08-11

## 2021-08-18 ENCOUNTER — IMMUNIZATION (OUTPATIENT)
Dept: LAB | Facility: HOSPITAL | Age: 51
End: 2021-08-18
Attending: EMERGENCY MEDICINE
Payer: COMMERCIAL

## 2021-08-18 DIAGNOSIS — Z23 NEED FOR VACCINATION: Primary | ICD-10-CM

## 2021-08-18 PROCEDURE — 0001A SARSCOV2 VAC 30MCG/0.3ML IM: CPT

## 2021-09-09 ENCOUNTER — IMMUNIZATION (OUTPATIENT)
Dept: LAB | Facility: HOSPITAL | Age: 51
End: 2021-09-09
Attending: EMERGENCY MEDICINE
Payer: COMMERCIAL

## 2021-09-09 DIAGNOSIS — Z23 NEED FOR VACCINATION: Primary | ICD-10-CM

## 2021-09-09 PROCEDURE — 0002A SARSCOV2 VAC 30MCG/0.3ML IM: CPT

## 2021-09-27 ENCOUNTER — APPOINTMENT (OUTPATIENT)
Dept: GENERAL RADIOLOGY | Age: 51
End: 2021-09-27
Attending: NURSE PRACTITIONER
Payer: COMMERCIAL

## 2021-09-27 ENCOUNTER — TELEMEDICINE (OUTPATIENT)
Dept: INTERNAL MEDICINE CLINIC | Facility: CLINIC | Age: 51
End: 2021-09-27

## 2021-09-27 ENCOUNTER — HOSPITAL ENCOUNTER (OUTPATIENT)
Age: 51
Discharge: HOME | End: 2021-09-27
Payer: COMMERCIAL

## 2021-09-27 VITALS
SYSTOLIC BLOOD PRESSURE: 116 MMHG | TEMPERATURE: 99 F | BODY MASS INDEX: 31.89 KG/M2 | HEIGHT: 63 IN | RESPIRATION RATE: 17 BRPM | OXYGEN SATURATION: 100 % | WEIGHT: 180 LBS | DIASTOLIC BLOOD PRESSURE: 80 MMHG | HEART RATE: 72 BPM

## 2021-09-27 DIAGNOSIS — R07.9 CHEST PAIN OF UNCERTAIN ETIOLOGY: Primary | ICD-10-CM

## 2021-09-27 DIAGNOSIS — R06.89 DECREASED BREATH SOUNDS: Primary | ICD-10-CM

## 2021-09-27 DIAGNOSIS — R05.9 COUGH: ICD-10-CM

## 2021-09-27 LAB
#MXD IC: 0.6 X10ˆ3/UL (ref 0.1–1)
ATRIAL RATE: 64 BPM
BUN BLD-MCNC: 12 MG/DL (ref 7–18)
CHLORIDE BLD-SCNC: 103 MMOL/L (ref 98–112)
CO2 BLD-SCNC: 28 MMOL/L (ref 21–32)
CREAT BLD-MCNC: 0.6 MG/DL
DDIMER WHOLE BLOOD: <200 NG/ML DDU (ref ?–400)
GLUCOSE BLD-MCNC: 89 MG/DL (ref 70–99)
HCT VFR BLD AUTO: 43.6 %
HCT VFR BLD CALC: 42 %
HGB BLD-MCNC: 14.2 G/DL
ISTAT IONIZED CALCIUM FOR CHEM 8: 1.2 MMOL/L (ref 1.12–1.32)
LYMPHOCYTES # BLD AUTO: 1.6 X10ˆ3/UL (ref 1–4)
LYMPHOCYTES NFR BLD AUTO: 33.7 %
MCH RBC QN AUTO: 30.2 PG (ref 26–34)
MCHC RBC AUTO-ENTMCNC: 32.6 G/DL (ref 31–37)
MCV RBC AUTO: 92.8 FL (ref 80–100)
MIXED CELL %: 11.8 %
NEUTROPHILS # BLD AUTO: 2.5 X10ˆ3/UL (ref 1.5–7.7)
NEUTROPHILS NFR BLD AUTO: 54.5 %
P AXIS: 32 DEGREES
P-R INTERVAL: 138 MS
PLATELET # BLD AUTO: 241 X10ˆ3/UL (ref 150–450)
POTASSIUM BLD-SCNC: 4.6 MMOL/L (ref 3.6–5.1)
Q-T INTERVAL: 390 MS
QRS DURATION: 86 MS
QTC CALCULATION (BEZET): 402 MS
R AXIS: 2 DEGREES
RBC # BLD AUTO: 4.7 X10ˆ6/UL
SODIUM BLD-SCNC: 139 MMOL/L (ref 136–145)
T AXIS: 15 DEGREES
TROPONIN I BLD-MCNC: <0.02 NG/ML
VENTRICULAR RATE: 64 BPM
WBC # BLD AUTO: 4.7 X10ˆ3/UL (ref 4–11)

## 2021-09-27 PROCEDURE — 93005 ELECTROCARDIOGRAM TRACING: CPT

## 2021-09-27 PROCEDURE — 84484 ASSAY OF TROPONIN QUANT: CPT

## 2021-09-27 PROCEDURE — 80047 BASIC METABLC PNL IONIZED CA: CPT

## 2021-09-27 PROCEDURE — 99214 OFFICE O/P EST MOD 30 MIN: CPT

## 2021-09-27 PROCEDURE — 71046 X-RAY EXAM CHEST 2 VIEWS: CPT | Performed by: NURSE PRACTITIONER

## 2021-09-27 PROCEDURE — 85378 FIBRIN DEGRADE SEMIQUANT: CPT | Performed by: NURSE PRACTITIONER

## 2021-09-27 PROCEDURE — 99215 OFFICE O/P EST HI 40 MIN: CPT

## 2021-09-27 PROCEDURE — 99212 OFFICE O/P EST SF 10 MIN: CPT | Performed by: FAMILY MEDICINE

## 2021-09-27 PROCEDURE — 93010 ELECTROCARDIOGRAM REPORT: CPT

## 2021-09-27 PROCEDURE — 36415 COLL VENOUS BLD VENIPUNCTURE: CPT

## 2021-09-27 PROCEDURE — 85025 COMPLETE CBC W/AUTO DIFF WBC: CPT | Performed by: NURSE PRACTITIONER

## 2021-09-27 RX ORDER — NAPROXEN 500 MG/1
500 TABLET ORAL 2 TIMES DAILY PRN
Qty: 20 TABLET | Refills: 0 | Status: SHIPPED | OUTPATIENT
Start: 2021-09-27 | End: 2021-10-04

## 2021-09-27 NOTE — ED QUICK NOTES
Attempted to started a PIV- unable to thread IV to right hand. Provider aware- butterfly draw to left hand with success.

## 2021-09-27 NOTE — ED PROVIDER NOTES
Patient Seen in: [facility] [place]      History   Patient presents with:  Pain    Stated Complaint: lung pain right side    Subjective:   HPI  79-year-old female with history of atrial fibrillation presents the immediate care complaining of righ 98.7 °F (37.1 °C) (Temporal)   Resp 17   Ht 160 cm (5' 3\")   Wt 81.6 kg   LMP 10/15/2015   SpO2 100%   BMI 31.89 kg/m²         Physical Exam  Vitals and nursing note reviewed. Constitutional:       Appearance: Normal appearance. She is well-developed.  Rupert Prophet chest radiograph performed 5/11/2021.     Dictated by (CST): Mildred Nolan MD on 9/27/2021 at 9:52 AM     Finalized by (CST): Mildred Nolan MD on 9/27/2021 at 9:53 AM              MDM    Upon initially entering the room patient stated that she did not want an EKG mouth 2 (two) times daily as needed., Normal, Disp-20 tablet, R-0

## 2021-09-27 NOTE — ED QUICK NOTES
Provider Dione Low)- went into the room to speak about her lab values and patient opened the door and left. Provider states to RN that patient told her she was \"condescending'. RN unable to provide discharge instruction to patient.

## 2021-09-27 NOTE — PROGRESS NOTES
Virtual Video Check-In     This visit is conducted using Telemedicine with live, interactive video and audio.     Sohail Drake, who has verified his/her identification by name and , verbally consents to a Virtual/Telephone Check-In visit on  Take 1 capsule by mouth daily. • Bacillus Coagulans-Inulin (PROBIOTIC) 1-250 BILLION-MG Oral Cap Take 1 capsule by mouth daily. • Flecainide Acetate 50 MG Oral Tab Take 1 tablet by mouth 2 (two) times daily.      • aspirin 81 MG Oral Chew Tab Chew 8 [facility]    ASSESSMENT/PLAN:  Decreased breath sounds  (primary encounter diagnosis)  Cough    No orders of the defined types were placed in this encounter.       Meds & Refills for this Visit:  Requested Prescriptions      No prescriptions requested or orde

## 2021-10-15 ENCOUNTER — NURSE ONLY (OUTPATIENT)
Dept: LAB | Facility: HOSPITAL | Age: 51
End: 2021-10-15
Attending: PREVENTIVE MEDICINE
Payer: COMMERCIAL

## 2021-10-15 ENCOUNTER — TELEPHONE (OUTPATIENT)
Dept: INTERNAL MEDICINE CLINIC | Facility: HOSPITAL | Age: 51
End: 2021-10-15

## 2021-10-15 DIAGNOSIS — Z20.822 CLOSE EXPOSURE TO COVID-19 VIRUS: Primary | ICD-10-CM

## 2021-10-15 DIAGNOSIS — Z20.822 CLOSE EXPOSURE TO COVID-19 VIRUS: ICD-10-CM

## 2021-10-15 NOTE — TELEPHONE ENCOUNTER
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 Any recent travel: Yes []     No [x]    If yes, location:     What shift do you work? Days  When was the last shift you worked?  10/11/2021  When are you next scheduled to work? 10/16/2021    Did you have close contact with someone on y exposure). [] Asymptomatic AND vaccinated or COVID infection in past 3 months: May work and continue to monitor symptoms for the                                       next 14 days. Test w/ [employer] 3-5 days after exposure.

## 2021-10-15 NOTE — TELEPHONE ENCOUNTER
Results and RTW guidelines:    COVID RESULT:    [] Viewed by employee in [address] RTW plan and instructions as indicated on triage call. Manager notified. Estimated RTW date:   [x] Discussed with employee   [] Unable to reach by phone.   Sent via [employer] worsen                - If outside testing completed, bring a copy of result to RTW appointment      Notes: Today is her daughter's last day of quarantine and her son tested negative.   She was having what she calls \" older lady occasional sniffles on an

## 2021-10-19 ENCOUNTER — OFFICE VISIT (OUTPATIENT)
Dept: PODIATRY CLINIC | Facility: CLINIC | Age: 51
End: 2021-10-19
Payer: COMMERCIAL

## 2021-10-19 DIAGNOSIS — B35.1 ONYCHOMYCOSIS: ICD-10-CM

## 2021-10-19 DIAGNOSIS — M72.2 PLANTAR FASCIITIS OF RIGHT FOOT: Primary | ICD-10-CM

## 2021-10-19 DIAGNOSIS — M79.671 INFLAMMATORY HEEL PAIN, RIGHT: ICD-10-CM

## 2021-10-19 DIAGNOSIS — M77.31 CALCANEAL SPUR OF RIGHT FOOT: ICD-10-CM

## 2021-10-19 PROCEDURE — 20550 NJX 1 TENDON SHEATH/LIGAMENT: CPT | Performed by: PODIATRIST

## 2021-10-19 RX ORDER — TRIAMCINOLONE ACETONIDE 40 MG/ML
20 INJECTION, SUSPENSION INTRA-ARTICULAR; INTRAMUSCULAR ONCE
Status: COMPLETED | OUTPATIENT
Start: 2021-10-19 | End: 2021-10-19

## 2021-10-19 NOTE — PROGRESS NOTES
Per Dr. Rodas Pock draw up 0.5ml of 0.5% Marcaine and 0.5ml of Kenalog 40 for injection to right heel.

## 2021-10-24 ENCOUNTER — PATIENT MESSAGE (OUTPATIENT)
Dept: PODIATRY CLINIC | Facility: CLINIC | Age: 51
End: 2021-10-24

## 2021-10-24 DIAGNOSIS — B35.1 ONYCHOMYCOSIS: Primary | ICD-10-CM

## 2021-10-24 NOTE — PROGRESS NOTES
Kerri Kuhn is a 46year old female. Patient presents with: Foot Pain: bilateral foot and  toe pain, pt rates pain 3/10 today in office.   Nail, Ingrown: left great toe         HPI:   Patient returns to the clinic with a chief complaint of bilateral 11   • Presence of other cardiac implants and grafts    • Stress    • Ventricular tachycardia (HCC)    • Wears glasses       Past Surgical History:   Procedure Laterality Date   •       x2   • COLONOSCOPY        Family History   Problem Relat there is no deficits no   4. Musculoskeletal: Patient has good muscle strength but has pain on palpation of the medial tubercle of calcaneus at the attachment of the plantar fascia on both heels but the right is much worse than the left.   X-rays were taken

## 2021-11-12 NOTE — TELEPHONE ENCOUNTER
Called [facility] [phone],  Per automated system has PPO plus plan effective 1/1/2017, patient not subject to pre-existing. Spoke with Yo Escobar, Is an EPO plan, we are a preferred provider. No exclusion other than flat feet. No Prior Auth required.    Fu

## 2021-12-03 ENCOUNTER — TELEPHONE (OUTPATIENT)
Dept: INTERNAL MEDICINE CLINIC | Facility: CLINIC | Age: 51
End: 2021-12-03

## 2021-12-03 ENCOUNTER — LAB ENCOUNTER (OUTPATIENT)
Dept: LAB | Facility: HOSPITAL | Age: 51
End: 2021-12-03
Attending: FAMILY MEDICINE
Payer: COMMERCIAL

## 2021-12-03 DIAGNOSIS — E55.9 VITAMIN D DEFICIENCY: Primary | ICD-10-CM

## 2021-12-03 DIAGNOSIS — Z00.00 LABORATORY EXAMINATION ORDERED AS PART OF A ROUTINE GENERAL MEDICAL EXAMINATION: ICD-10-CM

## 2021-12-03 DIAGNOSIS — E55.9 VITAMIN D DEFICIENCY: ICD-10-CM

## 2021-12-03 DIAGNOSIS — B35.1 ONYCHOMYCOSIS: ICD-10-CM

## 2021-12-03 PROCEDURE — 80061 LIPID PANEL: CPT

## 2021-12-03 PROCEDURE — 82248 BILIRUBIN DIRECT: CPT

## 2021-12-03 PROCEDURE — 36415 COLL VENOUS BLD VENIPUNCTURE: CPT

## 2021-12-03 PROCEDURE — 84443 ASSAY THYROID STIM HORMONE: CPT

## 2021-12-03 PROCEDURE — 83036 HEMOGLOBIN GLYCOSYLATED A1C: CPT

## 2021-12-03 PROCEDURE — 85025 COMPLETE CBC W/AUTO DIFF WBC: CPT

## 2021-12-03 PROCEDURE — 82306 VITAMIN D 25 HYDROXY: CPT

## 2021-12-03 PROCEDURE — 80053 COMPREHEN METABOLIC PANEL: CPT

## 2021-12-03 NOTE — TELEPHONE ENCOUNTER
Patient walked into office and dropped off forms that need to be completed by Charlette Ibrahim. Forms placed in Nidia's fax folder.

## 2021-12-06 NOTE — TELEPHONE ENCOUNTER
Per Thiago, Pt will need an appointment. Pt has not been seen since 12/2020 and is due for Px. Lab were completed but pt is due for Px. We do not have Recent vitals from our office. Sent pt [employer] message saying she needs Px. Form is in Paper work folder on desk.

## 2021-12-07 NOTE — PROGRESS NOTES
HPI:   Antoine Cowart is a 46year old female who presents for a complete physical exam. Symptoms: denies discharge, itching, burning or dysuria, is menopausal. Patient complains of :none.    Regular SBE- yes,Sexually active- no,  Contraception- menopaus 08/14/2019     Lab Results   Component Value Date    LDL 75 12/03/2021    LDL 79 11/08/2020    LDL 46 08/14/2019     Lab Results   Component Value Date    AST 19 12/03/2021    AST 14 (L) 12/05/2020    AST 12 (L) 11/08/2020     Lab Results   Component Value Currently      Alcohol/week: 0.0 standard drinks      Comment: rarely    Drug use: No    Occ: PCT. : yes. Children: 2.    Exercise: minimal.  Diet: intermittent fasting     REVIEW OF SYSTEMS:   GENERAL: feels well otherwise  SKIN: denies any unusual patient indicates understanding of these issues and agrees to the plan. The patient is asked to return for CPX in one year. 1. Encounter for screening mammogram for breast cancer    - [facility] AFIA 2D+3D SCREENING BILAT (CPT=77067/03655); Future    2.  Routine

## 2021-12-08 ENCOUNTER — OFFICE VISIT (OUTPATIENT)
Dept: INTERNAL MEDICINE CLINIC | Facility: CLINIC | Age: 51
End: 2021-12-08
Payer: COMMERCIAL

## 2021-12-08 ENCOUNTER — TELEPHONE (OUTPATIENT)
Dept: PODIATRY CLINIC | Facility: CLINIC | Age: 51
End: 2021-12-08

## 2021-12-08 VITALS
HEIGHT: 63 IN | SYSTOLIC BLOOD PRESSURE: 118 MMHG | WEIGHT: 184 LBS | HEART RATE: 76 BPM | TEMPERATURE: 97 F | BODY MASS INDEX: 32.6 KG/M2 | RESPIRATION RATE: 16 BRPM | DIASTOLIC BLOOD PRESSURE: 76 MMHG

## 2021-12-08 DIAGNOSIS — Z00.00 ROUTINE GENERAL MEDICAL EXAMINATION AT A HEALTH CARE FACILITY: Primary | ICD-10-CM

## 2021-12-08 DIAGNOSIS — Z12.31 ENCOUNTER FOR SCREENING MAMMOGRAM FOR BREAST CANCER: ICD-10-CM

## 2021-12-08 PROCEDURE — 3074F SYST BP LT 130 MM HG: CPT | Performed by: FAMILY MEDICINE

## 2021-12-08 PROCEDURE — 3078F DIAST BP <80 MM HG: CPT | Performed by: FAMILY MEDICINE

## 2021-12-08 PROCEDURE — 3008F BODY MASS INDEX DOCD: CPT | Performed by: FAMILY MEDICINE

## 2021-12-08 PROCEDURE — 99396 PREV VISIT EST AGE 40-64: CPT | Performed by: FAMILY MEDICINE

## 2021-12-08 RX ORDER — TERBINAFINE HYDROCHLORIDE 250 MG/1
250 TABLET ORAL DAILY
Qty: 45 TABLET | Refills: 0 | Status: SHIPPED | OUTPATIENT
Start: 2021-12-08

## 2021-12-08 NOTE — TELEPHONE ENCOUNTER
Spoke to patient and relayed Dr. Beckie Rush message as shown below. rx sent to pharmacy. Patient verbalized understanding and had no further questions at this time.

## 2021-12-08 NOTE — TELEPHONE ENCOUNTER
----- Message from Eloy Valdez DPM sent at 12/3/2021 10:59 AM CST -----  Hepatic function panel is normal please call in the following prescription:   Lamisil 250 mg tablets  Dispense 45, no refill  Instructions take 1 tablet daily p.o.   Then please

## 2022-01-12 ENCOUNTER — NURSE ONLY (OUTPATIENT)
Dept: LAB | Facility: HOSPITAL | Age: 52
End: 2022-01-12
Attending: PREVENTIVE MEDICINE
Payer: COMMERCIAL

## 2022-01-12 ENCOUNTER — TELEPHONE (OUTPATIENT)
Dept: INTERNAL MEDICINE CLINIC | Facility: HOSPITAL | Age: 52
End: 2022-01-12

## 2022-01-12 DIAGNOSIS — Z20.822 SUSPECTED COVID-19 VIRUS INFECTION: Primary | ICD-10-CM

## 2022-01-12 DIAGNOSIS — Z20.822 SUSPECTED COVID-19 VIRUS INFECTION: ICD-10-CM

## 2022-01-12 LAB — SARS-COV-2 RNA RESP QL NAA+PROBE: NOT DETECTED

## 2022-01-12 NOTE — TELEPHONE ENCOUNTER
Outside Covid Testing done    Results and RTW guidelines:    COVID RESULT reported:      Test type:    [] Rapid outside         [x] PCR outside       [] Home Test    Date of test:1/12/22    Test location: [facility]- test pending          [] Result viewed in Epi Manager/Supervisor/team or clinical lead: Celeste Ruth     Position:  [] MD     [] RN     [] Respiratory Therapist     [x] PCT     [] PSR      [] BHA     [] MA [] Other     If non-employed enter email address:     HAVE YOU RECEIVED THE COVID-19 Vaccine?  Chante Garcia

## 2022-03-02 ENCOUNTER — APPOINTMENT (OUTPATIENT)
Dept: GENERAL RADIOLOGY | Facility: HOSPITAL | Age: 52
End: 2022-03-02
Payer: COMMERCIAL

## 2022-03-02 ENCOUNTER — HOSPITAL ENCOUNTER (EMERGENCY)
Facility: HOSPITAL | Age: 52
Discharge: HOME | End: 2022-03-02
Attending: EMERGENCY MEDICINE
Payer: COMMERCIAL

## 2022-03-02 VITALS
BODY MASS INDEX: 32.78 KG/M2 | DIASTOLIC BLOOD PRESSURE: 81 MMHG | OXYGEN SATURATION: 100 % | WEIGHT: 185 LBS | TEMPERATURE: 98 F | HEIGHT: 63 IN | SYSTOLIC BLOOD PRESSURE: 119 MMHG | RESPIRATION RATE: 18 BRPM | HEART RATE: 76 BPM

## 2022-03-02 DIAGNOSIS — R07.89 CHEST PAIN, ATYPICAL: Primary | ICD-10-CM

## 2022-03-02 DIAGNOSIS — M54.12 CERVICAL RADICULOPATHY: ICD-10-CM

## 2022-03-02 LAB
ALBUMIN SERPL-MCNC: 4 G/DL (ref 3.4–5)
ALBUMIN/GLOB SERPL: 1.1 {RATIO} (ref 1–2)
ALP LIVER SERPL-CCNC: 91 U/L
ALT SERPL-CCNC: 42 U/L
ANION GAP SERPL CALC-SCNC: 1 MMOL/L (ref 0–18)
AST SERPL-CCNC: 25 U/L (ref 15–37)
BASOPHILS # BLD AUTO: 0.05 X10(3) UL (ref 0–0.2)
BASOPHILS NFR BLD AUTO: 0.6 %
BILIRUB SERPL-MCNC: 0.4 MG/DL (ref 0.1–2)
BUN BLD-MCNC: 12 MG/DL (ref 7–18)
CALCIUM BLD-MCNC: 9.1 MG/DL (ref 8.5–10.1)
CHLORIDE SERPL-SCNC: 106 MMOL/L (ref 98–112)
CO2 SERPL-SCNC: 30 MMOL/L (ref 21–32)
CREAT BLD-MCNC: 0.72 MG/DL
EOSINOPHIL # BLD AUTO: 0.09 X10(3) UL (ref 0–0.7)
EOSINOPHIL NFR BLD AUTO: 1.1 %
GLOBULIN PLAS-MCNC: 3.8 G/DL (ref 2.8–4.4)
GLUCOSE BLD-MCNC: 85 MG/DL (ref 70–99)
HCT VFR BLD AUTO: 44 %
HGB BLD-MCNC: 15.2 G/DL
IMM GRANULOCYTES # BLD AUTO: 0.03 X10(3) UL (ref 0–1)
IMM GRANULOCYTES NFR BLD: 0.4 %
LYMPHOCYTES # BLD AUTO: 2.3 X10(3) UL (ref 1–4)
LYMPHOCYTES NFR BLD AUTO: 27.3 %
MCH RBC QN AUTO: 31.1 PG (ref 26–34)
MCHC RBC AUTO-ENTMCNC: 34.5 G/DL (ref 31–37)
MCV RBC AUTO: 90.2 FL
MONOCYTES # BLD AUTO: 0.66 X10(3) UL (ref 0.1–1)
NEUTROPHILS # BLD AUTO: 5.31 X10 (3) UL (ref 1.5–7.7)
NEUTROPHILS # BLD AUTO: 5.31 X10(3) UL (ref 1.5–7.7)
NEUTROPHILS NFR BLD AUTO: 62.8 %
OSMOLALITY SERPL CALC.SUM OF ELEC: 283 MOSM/KG (ref 275–295)
PLATELET # BLD AUTO: 233 10(3)UL (ref 150–450)
POTASSIUM SERPL-SCNC: 3.9 MMOL/L (ref 3.5–5.1)
PROT SERPL-MCNC: 7.8 G/DL (ref 6.4–8.2)
RBC # BLD AUTO: 4.88 X10(6)UL
SODIUM SERPL-SCNC: 137 MMOL/L (ref 136–145)
TROPONIN I HIGH SENSITIVITY: 4 NG/L
WBC # BLD AUTO: 8.4 X10(3) UL (ref 4–11)

## 2022-03-02 PROCEDURE — 84484 ASSAY OF TROPONIN QUANT: CPT | Performed by: EMERGENCY MEDICINE

## 2022-03-02 PROCEDURE — 80053 COMPREHEN METABOLIC PANEL: CPT | Performed by: EMERGENCY MEDICINE

## 2022-03-02 PROCEDURE — 85025 COMPLETE CBC W/AUTO DIFF WBC: CPT | Performed by: EMERGENCY MEDICINE

## 2022-03-02 PROCEDURE — 99284 EMERGENCY DEPT VISIT MOD MDM: CPT

## 2022-03-02 PROCEDURE — 36415 COLL VENOUS BLD VENIPUNCTURE: CPT

## 2022-03-02 PROCEDURE — 93010 ELECTROCARDIOGRAM REPORT: CPT

## 2022-03-02 PROCEDURE — 93005 ELECTROCARDIOGRAM TRACING: CPT

## 2022-03-02 PROCEDURE — 71045 X-RAY EXAM CHEST 1 VIEW: CPT | Performed by: EMERGENCY MEDICINE

## 2022-03-02 NOTE — ED INITIAL ASSESSMENT (HPI)
Pt arrives with complaints of chest pain, numbness and tingling of her left arm, bilatera facial tingling and lip tingling since 1130 this morning. Pt states that the pain onset while she was doing nothing. Pt has a hx of afib and has had runs of vtach in the past. Pt AOx4 and ambulatory upon arrival. Chest pain is located left anterior chest and is reproducible with palpation.

## 2022-03-03 ENCOUNTER — OFFICE VISIT (OUTPATIENT)
Dept: PODIATRY CLINIC | Facility: CLINIC | Age: 52
End: 2022-03-03
Payer: COMMERCIAL

## 2022-03-03 DIAGNOSIS — M79.674 PAIN OF TOE OF RIGHT FOOT: ICD-10-CM

## 2022-03-03 DIAGNOSIS — L84 HELOMA DURUM: ICD-10-CM

## 2022-03-03 DIAGNOSIS — M20.41 HAMMER TOE OF RIGHT FOOT: ICD-10-CM

## 2022-03-03 DIAGNOSIS — B35.1 ONYCHOMYCOSIS: Primary | ICD-10-CM

## 2022-03-03 PROCEDURE — 99213 OFFICE O/P EST LOW 20 MIN: CPT | Performed by: PODIATRIST

## 2022-03-04 LAB
ATRIAL RATE: 79 BPM
P AXIS: 46 DEGREES
P-R INTERVAL: 130 MS
Q-T INTERVAL: 368 MS
QRS DURATION: 84 MS
QTC CALCULATION (BEZET): 421 MS
R AXIS: 21 DEGREES
T AXIS: 12 DEGREES
VENTRICULAR RATE: 79 BPM

## 2022-06-23 ENCOUNTER — OFFICE VISIT (OUTPATIENT)
Dept: PODIATRY CLINIC | Facility: CLINIC | Age: 52
End: 2022-06-23
Payer: COMMERCIAL

## 2022-06-23 VITALS — SYSTOLIC BLOOD PRESSURE: 132 MMHG | DIASTOLIC BLOOD PRESSURE: 68 MMHG

## 2022-06-23 DIAGNOSIS — M20.41 HAMMER TOE OF RIGHT FOOT: ICD-10-CM

## 2022-06-23 DIAGNOSIS — L84 HELOMA DURUM: ICD-10-CM

## 2022-06-23 DIAGNOSIS — M19.072 ARTHROSIS OF LEFT MIDFOOT: ICD-10-CM

## 2022-06-23 DIAGNOSIS — B35.1 ONYCHOMYCOSIS: Primary | ICD-10-CM

## 2022-06-23 DIAGNOSIS — M79.674 PAIN OF TOE OF RIGHT FOOT: ICD-10-CM

## 2022-06-23 DIAGNOSIS — M89.8X7 EXOSTOSIS OF LEFT FOOT: ICD-10-CM

## 2022-06-23 PROCEDURE — 99213 OFFICE O/P EST LOW 20 MIN: CPT | Performed by: PODIATRIST

## 2022-06-23 PROCEDURE — 3078F DIAST BP <80 MM HG: CPT | Performed by: PODIATRIST

## 2022-06-23 PROCEDURE — 3075F SYST BP GE 130 - 139MM HG: CPT | Performed by: PODIATRIST

## 2022-06-26 ENCOUNTER — TELEPHONE (OUTPATIENT)
Dept: PODIATRY CLINIC | Facility: CLINIC | Age: 52
End: 2022-06-26

## 2022-06-26 DIAGNOSIS — M19.072 ARTHROSIS OF FOOT, LEFT: ICD-10-CM

## 2022-06-26 DIAGNOSIS — M77.52 BONE SPUR OF LEFT FOOT: Primary | ICD-10-CM

## 2022-06-26 NOTE — TELEPHONE ENCOUNTER
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

## 2022-06-27 NOTE — TELEPHONE ENCOUNTER
Called patient this date and left a message for her to call me back directly to schedule at [phone]

## 2022-07-28 NOTE — TELEPHONE ENCOUNTER
Emre Smart from [facility] radiology called with stat US pelvis results. Please review pt waiting. Other

## 2022-10-27 ENCOUNTER — IMMUNIZATION (OUTPATIENT)
Dept: LAB | Facility: HOSPITAL | Age: 52
End: 2022-10-27
Attending: PREVENTIVE MEDICINE
Payer: COMMERCIAL

## 2022-10-27 DIAGNOSIS — Z23 NEED FOR VACCINATION: Primary | ICD-10-CM

## 2022-10-27 PROCEDURE — 90471 IMMUNIZATION ADMIN: CPT

## 2022-12-13 ENCOUNTER — APPOINTMENT (OUTPATIENT)
Dept: GENERAL RADIOLOGY | Facility: HOSPITAL | Age: 52
End: 2022-12-13
Attending: EMERGENCY MEDICINE
Payer: COMMERCIAL

## 2022-12-13 ENCOUNTER — APPOINTMENT (OUTPATIENT)
Dept: GENERAL RADIOLOGY | Facility: HOSPITAL | Age: 52
End: 2022-12-13
Payer: COMMERCIAL

## 2022-12-13 ENCOUNTER — HOSPITAL ENCOUNTER (EMERGENCY)
Facility: HOSPITAL | Age: 52
Discharge: HOME | End: 2022-12-13
Attending: EMERGENCY MEDICINE
Payer: COMMERCIAL

## 2022-12-13 VITALS
BODY MASS INDEX: 33.66 KG/M2 | RESPIRATION RATE: 19 BRPM | TEMPERATURE: 98 F | DIASTOLIC BLOOD PRESSURE: 77 MMHG | SYSTOLIC BLOOD PRESSURE: 122 MMHG | WEIGHT: 190 LBS | OXYGEN SATURATION: 97 % | HEART RATE: 75 BPM | HEIGHT: 63 IN

## 2022-12-13 DIAGNOSIS — R00.2 PALPITATIONS: Primary | ICD-10-CM

## 2022-12-13 DIAGNOSIS — R07.89 CHEST PAIN, ATYPICAL: ICD-10-CM

## 2022-12-13 LAB
ALBUMIN SERPL-MCNC: 3.8 G/DL (ref 3.4–5)
ALBUMIN/GLOB SERPL: 1 {RATIO} (ref 1–2)
ALP LIVER SERPL-CCNC: 94 U/L
ALT SERPL-CCNC: 27 U/L
ANION GAP SERPL CALC-SCNC: 7 MMOL/L (ref 0–18)
AST SERPL-CCNC: 18 U/L (ref 15–37)
ATRIAL RATE: 90 BPM
BASOPHILS # BLD AUTO: 0.04 X10(3) UL (ref 0–0.2)
BASOPHILS NFR BLD AUTO: 0.7 %
BILIRUB SERPL-MCNC: 0.4 MG/DL (ref 0.1–2)
BILIRUB UR QL STRIP.AUTO: NEGATIVE
BUN BLD-MCNC: 12 MG/DL (ref 7–18)
CALCIUM BLD-MCNC: 9.6 MG/DL (ref 8.5–10.1)
CHLORIDE SERPL-SCNC: 105 MMOL/L (ref 98–112)
CLARITY UR REFRACT.AUTO: CLEAR
CO2 SERPL-SCNC: 27 MMOL/L (ref 21–32)
COLOR UR AUTO: COLORLESS
CREAT BLD-MCNC: 0.68 MG/DL
EOSINOPHIL # BLD AUTO: 0.05 X10(3) UL (ref 0–0.7)
EOSINOPHIL NFR BLD AUTO: 0.9 %
ERYTHROCYTE [DISTWIDTH] IN BLOOD BY AUTOMATED COUNT: 12 %
GFR SERPLBLD BASED ON 1.73 SQ M-ARVRAT: 105 ML/MIN/1.73M2 (ref 60–?)
GLOBULIN PLAS-MCNC: 3.9 G/DL (ref 2.8–4.4)
GLUCOSE BLD-MCNC: 90 MG/DL (ref 70–99)
GLUCOSE UR STRIP.AUTO-MCNC: NEGATIVE MG/DL
HCT VFR BLD AUTO: 44.4 %
HGB BLD-MCNC: 15.2 G/DL
IMM GRANULOCYTES # BLD AUTO: 0.02 X10(3) UL (ref 0–1)
IMM GRANULOCYTES NFR BLD: 0.4 %
KETONES UR STRIP.AUTO-MCNC: NEGATIVE MG/DL
LYMPHOCYTES # BLD AUTO: 1.65 X10(3) UL (ref 1–4)
LYMPHOCYTES NFR BLD AUTO: 29.8 %
MCH RBC QN AUTO: 31.5 PG (ref 26–34)
MCHC RBC AUTO-ENTMCNC: 34.2 G/DL (ref 31–37)
MCV RBC AUTO: 92.1 FL
MONOCYTES # BLD AUTO: 0.37 X10(3) UL (ref 0.1–1)
MONOCYTES NFR BLD AUTO: 6.7 %
NEUTROPHILS # BLD AUTO: 3.41 X10 (3) UL (ref 1.5–7.7)
NEUTROPHILS # BLD AUTO: 3.41 X10(3) UL (ref 1.5–7.7)
NEUTROPHILS NFR BLD AUTO: 61.5 %
NITRITE UR QL STRIP.AUTO: NEGATIVE
OSMOLALITY SERPL CALC.SUM OF ELEC: 287 MOSM/KG (ref 275–295)
P AXIS: 56 DEGREES
P-R INTERVAL: 126 MS
PH UR STRIP.AUTO: 7 [PH] (ref 5–8)
PLATELET # BLD AUTO: 231 10(3)UL (ref 150–450)
POTASSIUM SERPL-SCNC: 3.6 MMOL/L (ref 3.5–5.1)
PROT SERPL-MCNC: 7.7 G/DL (ref 6.4–8.2)
PROT UR STRIP.AUTO-MCNC: NEGATIVE MG/DL
Q-T INTERVAL: 372 MS
QRS DURATION: 84 MS
QTC CALCULATION (BEZET): 455 MS
R AXIS: 22 DEGREES
RBC # BLD AUTO: 4.82 X10(6)UL
SODIUM SERPL-SCNC: 139 MMOL/L (ref 136–145)
SP GR UR STRIP.AUTO: 1 (ref 1–1.03)
T AXIS: 44 DEGREES
TROPONIN I HIGH SENSITIVITY: 4 NG/L
TROPONIN I HIGH SENSITIVITY: <3 NG/L
UROBILINOGEN UR STRIP.AUTO-MCNC: <2 MG/DL
VENTRICULAR RATE: 90 BPM
WBC # BLD AUTO: 5.5 X10(3) UL (ref 4–11)

## 2022-12-13 PROCEDURE — 87086 URINE CULTURE/COLONY COUNT: CPT | Performed by: EMERGENCY MEDICINE

## 2022-12-13 PROCEDURE — 99285 EMERGENCY DEPT VISIT HI MDM: CPT

## 2022-12-13 PROCEDURE — 84484 ASSAY OF TROPONIN QUANT: CPT

## 2022-12-13 PROCEDURE — 99284 EMERGENCY DEPT VISIT MOD MDM: CPT

## 2022-12-13 PROCEDURE — 81001 URINALYSIS AUTO W/SCOPE: CPT | Performed by: EMERGENCY MEDICINE

## 2022-12-13 PROCEDURE — 71045 X-RAY EXAM CHEST 1 VIEW: CPT | Performed by: EMERGENCY MEDICINE

## 2022-12-13 PROCEDURE — 85025 COMPLETE CBC W/AUTO DIFF WBC: CPT

## 2022-12-13 PROCEDURE — 36415 COLL VENOUS BLD VENIPUNCTURE: CPT

## 2022-12-13 PROCEDURE — 93005 ELECTROCARDIOGRAM TRACING: CPT

## 2022-12-13 PROCEDURE — 84484 ASSAY OF TROPONIN QUANT: CPT | Performed by: EMERGENCY MEDICINE

## 2022-12-13 PROCEDURE — 80053 COMPREHEN METABOLIC PANEL: CPT | Performed by: EMERGENCY MEDICINE

## 2022-12-13 PROCEDURE — 80053 COMPREHEN METABOLIC PANEL: CPT

## 2022-12-13 PROCEDURE — 99292 CRITICAL CARE ADDL 30 MIN: CPT

## 2022-12-13 PROCEDURE — 85025 COMPLETE CBC W/AUTO DIFF WBC: CPT | Performed by: EMERGENCY MEDICINE

## 2022-12-13 NOTE — ED INITIAL ASSESSMENT (HPI)
Pt states chest pain and heart felt like it was skipping a beat yesterday. Pt states pain has continued. Pt also states arm and neck pain.

## 2023-05-24 ENCOUNTER — OFFICE VISIT (OUTPATIENT)
Dept: NEUROLOGY | Facility: CLINIC | Age: 53
End: 2023-05-24
Payer: COMMERCIAL

## 2023-05-24 ENCOUNTER — LAB ENCOUNTER (OUTPATIENT)
Dept: LAB | Age: 53
End: 2023-05-24
Attending: Other
Payer: COMMERCIAL

## 2023-05-24 VITALS
SYSTOLIC BLOOD PRESSURE: 112 MMHG | RESPIRATION RATE: 16 BRPM | WEIGHT: 224 LBS | BODY MASS INDEX: 40 KG/M2 | DIASTOLIC BLOOD PRESSURE: 71 MMHG | HEART RATE: 61 BPM

## 2023-05-24 DIAGNOSIS — R41.3 MEMORY LOSS: ICD-10-CM

## 2023-05-24 DIAGNOSIS — G43.009 MIGRAINE WITHOUT AURA AND WITHOUT STATUS MIGRAINOSUS, NOT INTRACTABLE: ICD-10-CM

## 2023-05-24 DIAGNOSIS — G43.009 MIGRAINE WITHOUT AURA AND WITHOUT STATUS MIGRAINOSUS, NOT INTRACTABLE: Primary | ICD-10-CM

## 2023-05-24 LAB
ERYTHROCYTE [SEDIMENTATION RATE] IN BLOOD: 17 MM/HR
TSI SER-ACNC: 0.94 MIU/ML (ref 0.36–3.74)
VIT B12 SERPL-MCNC: >2000 PG/ML (ref 193–986)

## 2023-05-24 PROCEDURE — 84443 ASSAY THYROID STIM HORMONE: CPT

## 2023-05-24 PROCEDURE — 3078F DIAST BP <80 MM HG: CPT | Performed by: OTHER

## 2023-05-24 PROCEDURE — 99204 OFFICE O/P NEW MOD 45 MIN: CPT | Performed by: OTHER

## 2023-05-24 PROCEDURE — 3074F SYST BP LT 130 MM HG: CPT | Performed by: OTHER

## 2023-05-24 PROCEDURE — 85652 RBC SED RATE AUTOMATED: CPT

## 2023-05-24 PROCEDURE — 82607 VITAMIN B-12: CPT

## 2023-05-24 RX ORDER — LORAZEPAM 1 MG/1
1 TABLET ORAL ONCE
Qty: 1 TABLET | Refills: 0 | Status: SHIPPED | OUTPATIENT
Start: 2023-05-24 | End: 2023-05-24

## 2023-05-24 RX ORDER — NICOTINE POLACRILEX 4 MG/1
GUM, CHEWING ORAL
COMMUNITY

## 2023-05-24 NOTE — PROGRESS NOTES
Pt reports decline in short-term memory, notes difficulty with word finding. Changes present for the last year and are worsening. Denies getting lost driving or leaving stove unattended. Pt states reports she has headaches \"all time\" for the last year, 4 days per week. Present for the last year.

## 2023-06-14 ENCOUNTER — PATIENT MESSAGE (OUTPATIENT)
Dept: NEUROLOGY | Facility: CLINIC | Age: 53
End: 2023-06-14

## 2023-06-14 NOTE — TELEPHONE ENCOUNTER
MRI 06/13/2023    IMPRESSION:   1. 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 This finding reflects aberrant venous drainage of normal brain parenchyma and is of doubtful clinical significance. The developmental venous anomaly was not discernible on the 2018   non-contrast brain CT. The remainder of the brain MRI is normal.     2. Minimal age-indeterminate bilateral mastoid air cell inflammatory changes.

## 2023-06-14 NOTE — TELEPHONE ENCOUNTER
From: Morelia Daily  To: Navya Dunn DO  Sent: 6/14/2023 4:14 PM CDT  Subject: MRI    Is there any information on my MRI results from 6/13 that would shed light on my recent issues?   Anne Ashford

## 2023-09-15 ENCOUNTER — HOSPITAL ENCOUNTER (OUTPATIENT)
Dept: CT IMAGING | Age: 53
Discharge: HOME | End: 2023-09-15
Attending: FAMILY MEDICINE
Payer: COMMERCIAL

## 2023-09-15 ENCOUNTER — TELEPHONE (OUTPATIENT)
Dept: INTERNAL MEDICINE CLINIC | Facility: CLINIC | Age: 53
End: 2023-09-15

## 2023-09-15 ENCOUNTER — LAB ENCOUNTER (OUTPATIENT)
Dept: LAB | Age: 53
End: 2023-09-15
Attending: FAMILY MEDICINE
Payer: COMMERCIAL

## 2023-09-15 ENCOUNTER — OFFICE VISIT (OUTPATIENT)
Dept: INTERNAL MEDICINE CLINIC | Facility: CLINIC | Age: 53
End: 2023-09-15
Payer: COMMERCIAL

## 2023-09-15 VITALS
OXYGEN SATURATION: 94 % | HEIGHT: 63 IN | TEMPERATURE: 98 F | BODY MASS INDEX: 38.52 KG/M2 | WEIGHT: 217.38 LBS | SYSTOLIC BLOOD PRESSURE: 102 MMHG | HEART RATE: 72 BPM | DIASTOLIC BLOOD PRESSURE: 80 MMHG

## 2023-09-15 DIAGNOSIS — R10.32 ABDOMINAL PAIN, LEFT LOWER QUADRANT: ICD-10-CM

## 2023-09-15 DIAGNOSIS — E55.9 VITAMIN D DEFICIENCY: ICD-10-CM

## 2023-09-15 DIAGNOSIS — R10.32 ABDOMINAL PAIN, LEFT LOWER QUADRANT: Primary | ICD-10-CM

## 2023-09-15 DIAGNOSIS — Z12.31 VISIT FOR SCREENING MAMMOGRAM: ICD-10-CM

## 2023-09-15 PROBLEM — Z12.11 SPECIAL SCREENING FOR MALIGNANT NEOPLASM OF COLON: Status: RESOLVED | Noted: 2021-03-22 | Resolved: 2023-09-15

## 2023-09-15 PROBLEM — R10.13 ABDOMINAL PAIN, EPIGASTRIC: Status: RESOLVED | Noted: 2021-03-22 | Resolved: 2023-09-15

## 2023-09-15 LAB
ALBUMIN SERPL-MCNC: 3.7 G/DL (ref 3.4–5)
ALBUMIN/GLOB SERPL: 1 {RATIO} (ref 1–2)
ALP LIVER SERPL-CCNC: 95 U/L
ALT SERPL-CCNC: 21 U/L
ANION GAP SERPL CALC-SCNC: 4 MMOL/L (ref 0–18)
AST SERPL-CCNC: 12 U/L (ref 15–37)
BASOPHILS # BLD AUTO: 0.03 X10(3) UL (ref 0–0.2)
BASOPHILS NFR BLD AUTO: 0.6 %
BILIRUB SERPL-MCNC: 0.4 MG/DL (ref 0.1–2)
BUN BLD-MCNC: 11 MG/DL (ref 7–18)
CALCIUM BLD-MCNC: 9 MG/DL (ref 8.5–10.1)
CHLORIDE SERPL-SCNC: 110 MMOL/L (ref 98–112)
CO2 SERPL-SCNC: 27 MMOL/L (ref 21–32)
CREAT BLD-MCNC: 0.76 MG/DL
EGFRCR SERPLBLD CKD-EPI 2021: 94 ML/MIN/1.73M2 (ref 60–?)
EOSINOPHIL # BLD AUTO: 0.06 X10(3) UL (ref 0–0.7)
EOSINOPHIL NFR BLD AUTO: 1.1 %
ERYTHROCYTE [DISTWIDTH] IN BLOOD BY AUTOMATED COUNT: 12.2 %
FASTING STATUS PATIENT QL REPORTED: NO
GLOBULIN PLAS-MCNC: 3.8 G/DL (ref 2.8–4.4)
GLUCOSE BLD-MCNC: 103 MG/DL (ref 70–99)
HCT VFR BLD AUTO: 45.6 %
HGB BLD-MCNC: 15.6 G/DL
IMM GRANULOCYTES # BLD AUTO: 0.01 X10(3) UL (ref 0–1)
IMM GRANULOCYTES NFR BLD: 0.2 %
LYMPHOCYTES # BLD AUTO: 1.6 X10(3) UL (ref 1–4)
LYMPHOCYTES NFR BLD AUTO: 30.5 %
MCH RBC QN AUTO: 30.4 PG (ref 26–34)
MCHC RBC AUTO-ENTMCNC: 34.2 G/DL (ref 31–37)
MCV RBC AUTO: 88.9 FL
MONOCYTES # BLD AUTO: 0.42 X10(3) UL (ref 0.1–1)
MONOCYTES NFR BLD AUTO: 8 %
NEUTROPHILS # BLD AUTO: 3.12 X10 (3) UL (ref 1.5–7.7)
NEUTROPHILS # BLD AUTO: 3.12 X10(3) UL (ref 1.5–7.7)
NEUTROPHILS NFR BLD AUTO: 59.6 %
OSMOLALITY SERPL CALC.SUM OF ELEC: 292 MOSM/KG (ref 275–295)
PLATELET # BLD AUTO: 231 10(3)UL (ref 150–450)
POTASSIUM SERPL-SCNC: 3.5 MMOL/L (ref 3.5–5.1)
PROT SERPL-MCNC: 7.5 G/DL (ref 6.4–8.2)
RBC # BLD AUTO: 5.13 X10(6)UL
SODIUM SERPL-SCNC: 141 MMOL/L (ref 136–145)
VIT D+METAB SERPL-MCNC: 41 NG/ML (ref 30–100)
WBC # BLD AUTO: 5.2 X10(3) UL (ref 4–11)

## 2023-09-15 PROCEDURE — 3008F BODY MASS INDEX DOCD: CPT | Performed by: FAMILY MEDICINE

## 2023-09-15 PROCEDURE — 36415 COLL VENOUS BLD VENIPUNCTURE: CPT

## 2023-09-15 PROCEDURE — 80053 COMPREHEN METABOLIC PANEL: CPT

## 2023-09-15 PROCEDURE — 99214 OFFICE O/P EST MOD 30 MIN: CPT | Performed by: FAMILY MEDICINE

## 2023-09-15 PROCEDURE — 3074F SYST BP LT 130 MM HG: CPT | Performed by: FAMILY MEDICINE

## 2023-09-15 PROCEDURE — 74176 CT ABD & PELVIS W/O CONTRAST: CPT | Performed by: FAMILY MEDICINE

## 2023-09-15 PROCEDURE — 85025 COMPLETE CBC W/AUTO DIFF WBC: CPT

## 2023-09-15 PROCEDURE — 3079F DIAST BP 80-89 MM HG: CPT | Performed by: FAMILY MEDICINE

## 2023-09-15 PROCEDURE — 82306 VITAMIN D 25 HYDROXY: CPT

## 2023-09-15 RX ORDER — METOPROLOL SUCCINATE 25 MG/1
12.5 TABLET, EXTENDED RELEASE ORAL DAILY
COMMUNITY
Start: 2023-08-30

## 2023-09-15 RX ORDER — LORAZEPAM 1 MG/1
TABLET ORAL
COMMUNITY
Start: 2023-05-24

## 2023-09-27 DIAGNOSIS — G43.009 MIGRAINE WITHOUT AURA AND WITHOUT STATUS MIGRAINOSUS, NOT INTRACTABLE: Primary | ICD-10-CM

## 2023-09-27 NOTE — TELEPHONE ENCOUNTER
Medication: : TOPIRAMATE 25 MG      Date of last refill: 9/5/24 (#30/0)  [place]      Last office visit: 9/5/2023  Due back to clinic per last office note:  3 months  Date next office visit scheduled:    Future Appointments   Date Time Provider Tonia Villagomez   10/9/2023 10:20 AM LEXI BRITT [facility]1 [facility]   1/4/2024  1:00 PM DO VASQUEZ Heaton [address] [place] [address] note recommendation:    1. Migraines w/o aura  - Topamax 25mg PO QHS  - MRI brain shows a small DVA but nothing else remarkable     2. Memory loss  - MRI Brain reviewed  - MOCA 25/30  - Declines any referral to neuropsychology for memory testing  - B12, TSH, SED were WNL       Return in about 3 months (around 12/5/2023).

## 2023-09-28 RX ORDER — TOPIRAMATE 25 MG/1
25 TABLET ORAL NIGHTLY
Qty: 90 TABLET | Refills: 0 | Status: SHIPPED | OUTPATIENT
Start: 2023-09-28

## 2023-10-09 ENCOUNTER — HOSPITAL ENCOUNTER (OUTPATIENT)
Dept: MAMMOGRAPHY | Age: 53
Discharge: HOME | End: 2023-10-09
Attending: FAMILY MEDICINE
Payer: COMMERCIAL

## 2023-10-09 DIAGNOSIS — Z12.31 VISIT FOR SCREENING MAMMOGRAM: ICD-10-CM

## 2023-10-09 PROCEDURE — 77067 SCR MAMMO BI INCL CAD: CPT | Performed by: FAMILY MEDICINE

## 2023-10-09 PROCEDURE — 77063 BREAST TOMOSYNTHESIS BI: CPT | Performed by: FAMILY MEDICINE

## 2023-10-30 ENCOUNTER — TELEMEDICINE (OUTPATIENT)
Dept: INTERNAL MEDICINE CLINIC | Facility: CLINIC | Age: 53
End: 2023-10-30

## 2023-10-30 DIAGNOSIS — J01.90 ACUTE NON-RECURRENT SINUSITIS, UNSPECIFIED LOCATION: Primary | ICD-10-CM

## 2023-10-30 PROCEDURE — 99213 OFFICE O/P EST LOW 20 MIN: CPT | Performed by: FAMILY MEDICINE

## 2023-10-30 RX ORDER — BENZONATATE 200 MG/1
200 CAPSULE ORAL 3 TIMES DAILY PRN
Qty: 30 CAPSULE | Refills: 0 | Status: SHIPPED | OUTPATIENT
Start: 2023-10-30

## 2023-10-30 RX ORDER — AMOXICILLIN AND CLAVULANATE POTASSIUM 875; 125 MG/1; MG/1
1 TABLET, FILM COATED ORAL 2 TIMES DAILY
Qty: 20 TABLET | Refills: 0 | Status: SHIPPED | OUTPATIENT
Start: 2023-10-30 | End: 2023-11-09

## 2023-10-30 NOTE — PROGRESS NOTES
Virtual Video Check-In     This visit is conducted using Telemedicine with live, interactive video and audio. Roya Cardona, who has verified his/her identification by name and , verbally consents to a Virtual/Telephone Check-In visit on 10/30/23. Patient confirms that he/she is in the state of [place] at the time of service. Patient understands and accepts financial responsibility for any deductible, co-insurance and/or co-pays associated with this service. TIME: 6 minutes      HPI: The patient wishes to address some  upper respiratory symptoms. Has had them for the past week. Patient reports sore throat, congestion, yellow colored nasal discharge, dry cough, OTC cold meds have not been helping, prior history of sinusitis, PND,swollen gland,sinus pressure, denies fever      ROS:  GENERAL:  Denies fever, chills,weight change, fatigue  SKIN: Denies rashes, skin lesions  EYES: Denies blurred vision or double vision  HENT: see HPI  CHEST: Denies chest pain, or palpitations  LUNGS: Denies shortness of breath,wheezing,+ cough  GI: Denies abdominal pain, N/V/C/D.   MUSCULOSKELETAL: Denies any arthralgia,myalgias or swollen joints  LYMPH:  Denies lymphadenopathy  NEURO:  Denies headaches and lightheadedness. PSYCH: denies depression or anxiety    ALLERGIES:  No Known Allergies    Current Outpatient Medications   Medication Sig Dispense Refill    benzonatate 200 MG Oral Cap Take 1 capsule (200 mg total) by mouth 3 (three) times daily as needed for cough. 30 capsule 0    amoxicillin clavulanate 875-125 MG Oral Tab Take 1 tablet by mouth 2 (two) times daily for 10 days. 20 tablet 0    topiramate 25 MG Oral Tab Take 1 tablet (25 mg total) by mouth nightly. 90 tablet 0    metoprolol succinate ER 25 MG Oral Tablet 24 Hr Take 0.5 tablets (12.5 mg total) by mouth daily. ondansetron (ZOFRAN) 4 mg tablet Take 1 tablet (4 mg total) by mouth every 8 (eight) hours as needed for Nausea.  20 tablet 0    Zinc 50 MG Oral Cap Take by mouth. Omeprazole 20 MG Oral Tab EC Take by mouth.      metoprolol tartrate 25 MG Oral Tab Take 0.5 tablets (12.5 mg total) by mouth 2 (two) times daily. (Patient not taking: Reported on 9/15/2023)      Cholecalciferol (VITAMIN D) 125 MCG (5000 UT) Oral Cap Take 1 capsule (5,000 Units total) by mouth daily. Bacillus Coagulans-Inulin (PROBIOTIC) 1-250 BILLION-MG Oral Cap Take 1 capsule by mouth daily. Flecainide Acetate 50 MG Oral Tab Take 1 tablet (50 mg total) by mouth 2 (two) times daily. aspirin 81 MG Oral Chew Tab Chew 1 tablet (81 mg total) by mouth daily. Cyanocobalamin (VITAMIN B-12 OR) Take 1 capsule by mouth daily. Magnesium Oxide 400 (240 MG) MG Oral Tab Take 1 tablet (400 mg total) by mouth daily.   3       Past Medical History:   Diagnosis Date    A-fib ([facility] [address]     Anxiety     Constipation     Heart palpitations     Obesity, unspecified 11    KARMA (obstructive sleep apnea) 10/30/17-[facility]    AHI 5 REM AHI 10 Supine AHI 29 non-supine AHI 1 Sao2 Theodore 90%     Pain in joint, shoulder region 11    right    Palpitations 11    Presence of other cardiac implants and grafts     Stress     Ventricular tachycardia (HCC)     Wears glasses       Past Surgical History:   Procedure Laterality Date          x2    COLONOSCOPY        Social History     Socioeconomic History    Marital status:    Tobacco Use    Smoking status: Never    Smokeless tobacco: Never   Vaping Use    Vaping Use: Never used   Substance and Sexual Activity    Alcohol use: Not Currently     Alcohol/week: 0.0 standard drinks of alcohol     Comment: rarely    Drug use: No    Sexual activity: Yes     Partners: Male     Birth control/protection: Condom     Comment: ablation    Other Topics Concern    Caffeine Concern No    Stress Concern Yes     Comment: pretty high daily    Special Diet No    Exercise No       Family History   Problem Relation Age of Onset    Cancer Father colon?    Diabetes Father     Crohn's Disease Father     Hypertension Father     Ulcerative Colitis Mother     Ulcerative Colitis Son     Cancer Paternal Grandmother         mandibular cancer    Diabetes Sister     Heart Attack Sister         PE:  No vital signs were taken for this video visit. GENERAL: well developed, well nourished, in no acute distress  SKIN: no rashes on visible parts of skin  HEENT: normocephalic, atraumatic, conjunctiva clear. Pt has audible congestion as she speaks  LUNGS: regular breathing rate and effort with no audible respiratory distress. Congested cough noted. NEURO: A&Ox3  PSYCH: pleasant mood and affect, appropriate judgement and insight    ASSESSMENT/PLAN:    Acute non-recurrent sinusitis, unspecified location  (primary encounter diagnosis)    No orders of the defined types were placed in this encounter. Meds & Refills for this Visit:  Requested Prescriptions     Signed Prescriptions Disp Refills    benzonatate 200 MG Oral Cap 30 capsule 0     Sig: Take 1 capsule (200 mg total) by mouth 3 (three) times daily as needed for cough. amoxicillin clavulanate 875-125 MG Oral Tab 20 tablet 0     Sig: Take 1 tablet by mouth 2 (two) times daily for 10 days. Imaging & Consults:  None    - start Augmentin, take with food.  -Sleep with head elevated at nightime if coughing    -Push fluids, tea with honey and plenty of rest   -Limit dairy, to avoid increased congestion  -OTC cough medicine such as Robitussin DM or tessalon  -OTC Tylenol/Ibuprofen   -Soothing cough drops   -Coricidin as needed  Call if symptoms worsen or do not improve. Patient verbalizes understanding of the treatment plan. *Please note that the following visit was completed using two-way, real-time interactive audio and/or video communication.   This has been done in good adriana to provide continuity of care in the best interest of the provider-patient relationship, due to the ongoing public health crisis/national emergency and because of restrictions of visitation. There are limitations of this visit as physical exam could not be fully performed. Every conscious effort was taken to allow for sufficient and adequate time. Included in this visit, time may have been spent reviewing labs, medications, radiology tests and decision-making. Appropriate medical decision-making and tests are ordered as detailed in the plan of care above. Coding/billing information is submitted for this visit based on complexity of care and/or time spent for the visit.       Start Time: 10:35 am   End Time: 10 : 41 am    Nichole CLINTON

## 2023-11-01 ENCOUNTER — OFFICE VISIT (OUTPATIENT)
Dept: FAMILY MEDICINE CLINIC | Facility: CLINIC | Age: 53
End: 2023-11-01
Payer: COMMERCIAL

## 2023-11-01 VITALS
WEIGHT: 217 LBS | BODY MASS INDEX: 38 KG/M2 | OXYGEN SATURATION: 98 % | TEMPERATURE: 97 F | DIASTOLIC BLOOD PRESSURE: 84 MMHG | HEART RATE: 82 BPM | SYSTOLIC BLOOD PRESSURE: 116 MMHG | RESPIRATION RATE: 18 BRPM

## 2023-11-01 DIAGNOSIS — J98.01 BRONCHOSPASM: Primary | ICD-10-CM

## 2023-11-01 DIAGNOSIS — J04.0 LARYNGITIS: ICD-10-CM

## 2023-11-01 DIAGNOSIS — R05.1 ACUTE COUGH: ICD-10-CM

## 2023-11-01 PROCEDURE — 3079F DIAST BP 80-89 MM HG: CPT

## 2023-11-01 PROCEDURE — 99213 OFFICE O/P EST LOW 20 MIN: CPT

## 2023-11-01 PROCEDURE — 3074F SYST BP LT 130 MM HG: CPT

## 2023-11-01 RX ORDER — ALBUTEROL SULFATE 90 UG/1
2 AEROSOL, METERED RESPIRATORY (INHALATION) EVERY 4 HOURS PRN
Qty: 1 EACH | Refills: 0 | Status: SHIPPED | OUTPATIENT
Start: 2023-11-01 | End: 2023-11-08

## 2023-11-01 RX ORDER — PREDNISONE 20 MG/1
40 TABLET ORAL DAILY
Qty: 10 TABLET | Refills: 0 | Status: SHIPPED | OUTPATIENT
Start: 2023-11-01 | End: 2023-11-06

## 2024-02-28 ENCOUNTER — OFFICE VISIT (OUTPATIENT)
Dept: FAMILY MEDICINE CLINIC | Facility: CLINIC | Age: 54
End: 2024-02-28
Payer: COMMERCIAL

## 2024-02-28 VITALS
OXYGEN SATURATION: 99 % | WEIGHT: 200 LBS | RESPIRATION RATE: 16 BRPM | TEMPERATURE: 97 F | BODY MASS INDEX: 35.44 KG/M2 | HEART RATE: 69 BPM | DIASTOLIC BLOOD PRESSURE: 80 MMHG | SYSTOLIC BLOOD PRESSURE: 112 MMHG | HEIGHT: 63 IN

## 2024-02-28 DIAGNOSIS — J06.9 UPPER RESPIRATORY TRACT INFECTION, UNSPECIFIED TYPE: Primary | ICD-10-CM

## 2024-02-28 DIAGNOSIS — H65.93 FLUID LEVEL BEHIND TYMPANIC MEMBRANE OF BOTH EARS: ICD-10-CM

## 2024-02-28 PROCEDURE — 99213 OFFICE O/P EST LOW 20 MIN: CPT | Performed by: NURSE PRACTITIONER

## 2024-02-29 NOTE — PROGRESS NOTES
CHIEF COMPLAINT:     Chief Complaint   Patient presents with    Ear Problem     Since sunday Sore throat, runny nose, ear pain right side  OTC none       HPI:   Angelique Mccloud is a 54 year old female who presents for upper respiratory symptoms for  4 days. Patient reports sore throat, ear pain, runny nose . Symptoms have been worsening since onset.  Treating symptoms with nothing.      Current Outpatient Medications   Medication Sig Dispense Refill    benzonatate 200 MG Oral Cap Take 1 capsule (200 mg total) by mouth 3 (three) times daily as needed for cough. 30 capsule 0    topiramate 25 MG Oral Tab Take 1 tablet (25 mg total) by mouth nightly. 90 tablet 0    metoprolol succinate ER 25 MG Oral Tablet 24 Hr Take 0.5 tablets (12.5 mg total) by mouth daily.      ondansetron (ZOFRAN) 4 mg tablet Take 1 tablet (4 mg total) by mouth every 8 (eight) hours as needed for Nausea. (Patient not taking: Reported on 11/1/2023) 20 tablet 0    Zinc 50 MG Oral Cap Take by mouth.      Omeprazole 20 MG Oral Tab EC Take by mouth.      metoprolol tartrate 25 MG Oral Tab Take 0.5 tablets (12.5 mg total) by mouth 2 (two) times daily. (Patient not taking: Reported on 9/15/2023)      Cholecalciferol (VITAMIN D) 125 MCG (5000 UT) Oral Cap Take 1 capsule (5,000 Units total) by mouth daily.      Bacillus Coagulans-Inulin (PROBIOTIC) 1-250 BILLION-MG Oral Cap Take 1 capsule by mouth daily.      Flecainide Acetate 50 MG Oral Tab Take 1 tablet (50 mg total) by mouth 2 (two) times daily.      aspirin 81 MG Oral Chew Tab Chew 1 tablet (81 mg total) by mouth daily.      Cyanocobalamin (VITAMIN B-12 OR) Take 1 capsule by mouth daily.        Magnesium Oxide 400 (240 MG) MG Oral Tab Take 1 tablet (400 mg total) by mouth daily.  3      Past Medical History:   Diagnosis Date    A-fib (HCC)     Anxiety     Constipation     Heart palpitations     Obesity, unspecified 9/23/11    KARMA (obstructive sleep apnea) 10/30/17-[facility]    AHI 5 REM AHI 10 Supine  AHI 29 non-supine AHI 1 Sao2 Theodore 90%     Pain in joint, shoulder region 11    right    Palpitations 11    Presence of other cardiac implants and grafts     Stress     Ventricular tachycardia (HCC)     Wears glasses       Past Surgical History:   Procedure Laterality Date          x2    COLONOSCOPY           Social History     Socioeconomic History    Marital status:    Tobacco Use    Smoking status: Never    Smokeless tobacco: Never   Vaping Use    Vaping Use: Never used   Substance and Sexual Activity    Alcohol use: Not Currently     Alcohol/week: 0.0 standard drinks of alcohol     Comment: rarely    Drug use: No    Sexual activity: Yes     Partners: Male     Birth control/protection: Condom     Comment: ablation    Other Topics Concern    Caffeine Concern No    Stress Concern Yes     Comment: pretty high daily    Special Diet No    Exercise No         REVIEW OF SYSTEMS:   GENERAL: normal appetite  SKIN: no rashes or abnormal skin lesions  HEENT: See HPI  LUNGS: See HPI  CARDIOVASCULAR: denies chest pain or palpitations   GI: denies N/V/C or abdominal pain      EXAM:   /80   Pulse 69   Temp 96.7 °F (35.9 °C)   Resp 16   Ht 5' 3\" (1.6 m)   Wt 200 lb (90.7 kg)   LMP 2013   SpO2 99%   BMI 35.43 kg/m²   GENERAL: well developed, well nourished,in no apparent distress  SKIN: no rashes,no suspicious lesions  HEAD: atraumatic, normocephalic.  no tenderness on palpation of  sinuses  EYES: conjunctiva clear, EOM intact  EARS: TM's pearly, pos bulging, no retraction,pos fluid, bony landmarks visible  NOSE: Nostrils patent, clear nasal discharge, nasal mucosa red and inflamed   THROAT: Oral mucosa pink, moist. Posterior pharynx is not erythematous. no exudates. Tonsils 2/4.    NECK: Supple, non-tender  LUNGS: clear to auscultation bilaterally, no wheezes or rhonchi. Breathing is non labored.  CARDIO: RRR without murmur  EXTREMITIES: no cyanosis, clubbing or edema  LYMPH:  no  lymphadenopathy.        ASSESSMENT AND PLAN:   Angelique Mccloud is a 54 year old female who presents with upper respiratory symptoms that are consistent with    ASSESSMENT:   Encounter Diagnoses   Name Primary?    Upper respiratory tract infection, unspecified type Yes    Fluid level behind tympanic membrane of both ears        PLAN: Meds as below.  Comfort care as described in Patient Instructions  Educated on viral vs bacterial  Educated on supportive measures: ibuprofen/tylenol, hydration, delsym for cough if needed  Educated on s/s of worsening sx and when to seek higher level of care  Follow up with pcp if not improving      Meds & Refills for this Visit:  Requested Prescriptions      No prescriptions requested or ordered in this encounter     Risks, benefits, and side effects of medication explained and discussed.    The patient indicates understanding of these issues and agrees to the plan.  The patient is asked to f/u with PCP if sx's persist or worsen.  There are no Patient Instructions on file for this visit.

## 2024-03-04 ENCOUNTER — TELEPHONE (OUTPATIENT)
Dept: FAMILY MEDICINE CLINIC | Facility: CLINIC | Age: 54
End: 2024-03-04

## 2024-03-04 NOTE — TELEPHONE ENCOUNTER
Patient calling reporting worsening ear pain.  Would like an Rx called in.  Discussed need to be rechecked in clinic. Discussed clinic hours.

## 2024-03-05 ENCOUNTER — OFFICE VISIT (OUTPATIENT)
Dept: INTERNAL MEDICINE CLINIC | Facility: CLINIC | Age: 54
End: 2024-03-05
Payer: COMMERCIAL

## 2024-03-05 VITALS
BODY MASS INDEX: 39.45 KG/M2 | HEIGHT: 63 IN | WEIGHT: 222.63 LBS | DIASTOLIC BLOOD PRESSURE: 80 MMHG | OXYGEN SATURATION: 98 % | HEART RATE: 72 BPM | RESPIRATION RATE: 16 BRPM | SYSTOLIC BLOOD PRESSURE: 134 MMHG | TEMPERATURE: 97 F

## 2024-03-05 DIAGNOSIS — H69.91 ACUTE DYSFUNCTION OF RIGHT EUSTACHIAN TUBE: Primary | ICD-10-CM

## 2024-03-05 PROCEDURE — 99213 OFFICE O/P EST LOW 20 MIN: CPT | Performed by: FAMILY MEDICINE

## 2024-03-05 RX ORDER — METHYLPREDNISOLONE 4 MG/1
TABLET ORAL
Qty: 1 EACH | Refills: 0 | Status: SHIPPED | OUTPATIENT
Start: 2024-03-05

## 2024-03-05 NOTE — PROGRESS NOTES
CHIEF COMPLAINT:     Chief Complaint   Patient presents with    Ear Pain     R ear pain, went into [facility] on Tuesday but no help.       HPI:   Angelique Mccloud is a non-toxic, well appearing 54 year old   for complaints of right ear pain. Has had for 1  weeks.  Pt was seen at the [facility] on 2/28/24 and she was told she had a viral illness and it would get better on its own. Pt has not gotten better and that is why she is here today.Parent/Patient reports history of ear infections. Home treatment includes tylenol.  Parent/Patient denies decreased hearing.  Parent/Patient denies drainage. Parent/Patient reports occ use of Q-tips to clean the ears.  Patient/parent denies recent upper respiratory symptoms. Patient/parent denies fever. .     Current Outpatient Medications   Medication Sig Dispense Refill    methylPREDNISolone (MEDROL) 4 MG Oral Tablet Therapy Pack As directed. 1 each 0    Zinc 50 MG Oral Cap Take by mouth.      metoprolol tartrate 25 MG Oral Tab Take 0.5 tablets (12.5 mg total) by mouth 2 (two) times daily.      Cholecalciferol (VITAMIN D) 125 MCG (5000 UT) Oral Cap Take 1 capsule (5,000 Units total) by mouth daily.      Bacillus Coagulans-Inulin (PROBIOTIC) 1-250 BILLION-MG Oral Cap Take 1 capsule by mouth daily.      Flecainide Acetate 50 MG Oral Tab Take 1 tablet (50 mg total) by mouth 2 (two) times daily.      aspirin 81 MG Oral Chew Tab Chew 1 tablet (81 mg total) by mouth daily.      Cyanocobalamin (VITAMIN B-12 OR) Take 1 capsule by mouth daily.        Magnesium Oxide 400 (240 MG) MG Oral Tab Take 1 tablet (400 mg total) by mouth daily.  3    topiramate 25 MG Oral Tab Take 1 tablet (25 mg total) by mouth nightly. (Patient not taking: Reported on 3/5/2024) 90 tablet 0    metoprolol succinate ER 25 MG Oral Tablet 24 Hr Take 0.5 tablets (12.5 mg total) by mouth daily.      Omeprazole 20 MG Oral Tab EC Take by mouth. (Patient not taking: Reported on 3/5/2024)        Past Medical History:   Diagnosis Date     A-fib (HCC)     Anxiety     Constipation     Heart palpitations     Obesity, unspecified 9/23/11    KARMA (obstructive sleep apnea) 10/30/17-[facility]    AHI 5 REM AHI 10 Supine AHI 29 non-supine AHI 1 Sao2 Theodore 90%     Pain in joint, shoulder region 9/23/11    right    Palpitations 1/31/11    Presence of other cardiac implants and grafts     Stress     Ventricular tachycardia (HCC)     Wears glasses       Social History:  Social History     Socioeconomic History    Marital status:    Tobacco Use    Smoking status: Never    Smokeless tobacco: Never   Vaping Use    Vaping Use: Never used   Substance and Sexual Activity    Alcohol use: Not Currently     Alcohol/week: 0.0 standard drinks of alcohol     Comment: rarely    Drug use: No    Sexual activity: Yes     Partners: Male     Birth control/protection: Condom     Comment: ablation    Other Topics Concern    Caffeine Concern No    Stress Concern Yes     Comment: pretty high daily    Special Diet No    Exercise No        REVIEW OF SYSTEMS:   GENERAL:  normal activity level.  normal appetite.  + sleep disturbances due to the ear pain.  SKIN: no unusual skin lesions or rashes  EYES: No scleral injection/erythema.  No eye discharge.   HENT: See HPI.   LUNGS: Denies shortness of breath, or wheezing.  GI: No N/V/C/D.  NEURO: denies headaches or gait disturbances    EXAM:   /80 (BP Location: Left arm, Patient Position: Sitting, Cuff Size: adult)   Pulse 72   Temp 97.2 °F (36.2 °C) (Temporal)   Resp 16   Ht 5' 3\" (1.6 m)   Wt 222 lb 9.6 oz (101 kg)   LMP 03/19/2013   SpO2 98%   BMI 39.43 kg/m²   GENERAL: well developed, well nourished,in no apparent distress  SKIN: no rashes,no suspicious lesions  HEAD: atraumatic, normocephalic  EYES: conjunctiva clear, EOM intact  EARS: Tragus non tender on palpation bilaterally. External auditory canals healthy. Right TM: dull, no bulging, no retraction,+ effusion.  Left TM: clear, no bulging, no retraction,no  effusion.  NOSE: nostrils patent, no nasal discharge, nasal mucosa pink and noninflamed  THROAT: oral mucosa pink, moist. Posterior pharynx is not erythematous or injected. No exudates.  NECK: supple, non-tender  LUNGS: clear to auscultation bilaterally, no wheezes or rhonchi. Breathing is non labored.  CARDIO: RRR without murmur  LYMPH: no lymphadenopathy.      ASSESSMENT AND PLAN:   Angelique Mccloud is a 54 year old female who presents with:    ASSESSMENT:  Encounter Diagnosis   Name Primary?    Acute dysfunction of right eustachian tube Yes       PLAN: Meds as listed below.  Comfort measures as described in Patient Instructions    Meds & Refills for this Visit:  Requested Prescriptions     Signed Prescriptions Disp Refills    methylPREDNISolone (MEDROL) 4 MG Oral Tablet Therapy Pack 1 each 0     Sig: As directed.     Eustation tube dysfunction: discussed various treatments. Pt given a medrol dose pk, and Flonase nasal spray to decreased swelling.   Call or return if s/sx worsen. Pt may then have to see ENT- Dr. Whitley.    Patient/Parent voiced understand and is in agreement with treatment plan.

## 2024-04-07 ENCOUNTER — HOSPITAL ENCOUNTER (OUTPATIENT)
Age: 54
Discharge: HOME | End: 2024-04-07
Payer: COMMERCIAL

## 2024-04-07 ENCOUNTER — APPOINTMENT (OUTPATIENT)
Dept: GENERAL RADIOLOGY | Age: 54
End: 2024-04-07
Attending: NURSE PRACTITIONER
Payer: COMMERCIAL

## 2024-04-07 VITALS
HEIGHT: 63 IN | WEIGHT: 210 LBS | DIASTOLIC BLOOD PRESSURE: 72 MMHG | BODY MASS INDEX: 37.21 KG/M2 | HEART RATE: 72 BPM | TEMPERATURE: 97 F | OXYGEN SATURATION: 97 % | RESPIRATION RATE: 16 BRPM | SYSTOLIC BLOOD PRESSURE: 115 MMHG

## 2024-04-07 DIAGNOSIS — S89.91XA INJURY OF RIGHT KNEE, INITIAL ENCOUNTER: Primary | ICD-10-CM

## 2024-04-07 PROCEDURE — 99213 OFFICE O/P EST LOW 20 MIN: CPT | Performed by: NURSE PRACTITIONER

## 2024-04-07 PROCEDURE — A6449 LT COMPRES BAND >=3" <5"/YD: HCPCS | Performed by: NURSE PRACTITIONER

## 2024-04-07 PROCEDURE — 73560 X-RAY EXAM OF KNEE 1 OR 2: CPT | Performed by: NURSE PRACTITIONER

## 2024-04-07 NOTE — ED INITIAL ASSESSMENT (HPI)
Pt with c/o right knee pain. States she was walking and stepped to the side and her knee pain started. Feels like her inner knee has fluid. Has radiating pain up and down her leg with walking.

## 2024-04-07 NOTE — ED PROVIDER NOTES
Patient Seen in: [facility]      History     Chief Complaint   Patient presents with    Knee Pain     Stated Complaint: right knee pain    Subjective:   54-year-old female presents today with injury to the right knee.  States stepped wrong causing her to twist her knee.  Now has pain and swelling especially with walking.  Decreased range of motion due to pain.  No gross deformities.  No other injuries or concerns.  The patient's medication list, past medical history and social history elements as listed in today's nurse's notes were reviewed and agreed (except as otherwise stated in the HPI).  The patient's family history reviewed and determined to be noncontributory to the presenting problem            Objective:   Past Medical History:   Diagnosis Date    A-fib (HCC)     Anxiety     Constipation     Heart palpitations     Obesity, unspecified 11    KARMA (obstructive sleep apnea) 10/30/17-[facility]    AHI 5 REM AHI 10 Supine AHI 29 non-supine AHI 1 Sao2 Theodore 90%     Pain in joint, shoulder region 11    right    Palpitations 11    Presence of other cardiac implants and grafts     Stress     Ventricular tachycardia (HCC)     Wears glasses               Past Surgical History:   Procedure Laterality Date          x2    COLONOSCOPY                  Social History     Socioeconomic History    Marital status:    Tobacco Use    Smoking status: Never    Smokeless tobacco: Never   Vaping Use    Vaping Use: Never used   Substance and Sexual Activity    Alcohol use: Not Currently     Alcohol/week: 0.0 standard drinks of alcohol     Comment: rarely    Drug use: No    Sexual activity: Yes     Partners: Male     Birth control/protection: Condom     Comment: ablation    Other Topics Concern    Caffeine Concern No    Stress Concern Yes     Comment: pretty high daily    Special Diet No    Exercise No              Review of Systems    Positive for stated complaint: right knee pain  Other  systems are as noted in HPI.  Constitutional and vital signs reviewed.      All other systems reviewed and negative except as noted above.    Physical Exam     ED Triage Vitals [04/07/24 0819]   /72   Pulse 72   Resp 16   Temp 97.3 °F (36.3 °C)   Temp src Temporal   SpO2 97 %   O2 Device None (Room air)       Current:/72   Pulse 72   Temp 97.3 °F (36.3 °C) (Temporal)   Resp 16   Ht 160 cm (5' 3\")   Wt 95.3 kg   LMP 03/19/2013   SpO2 97%   BMI 37.20 kg/m²         Physical Exam  Vitals and nursing note reviewed.   Constitutional:       Appearance: Normal appearance.   HENT:      Head: Normocephalic.      Mouth/Throat:      Mouth: Mucous membranes are moist.   Cardiovascular:      Rate and Rhythm: Normal rate.   Pulmonary:      Effort: Pulmonary effort is normal.   Musculoskeletal:      Comments: Pain with palpation to the medial aspect of the right knee.  Decreased flexion on exam.  No gross deformities or swelling.  Skin is warm dry no color and intact.  CMS intact.   Skin:     General: Skin is warm and dry.   Neurological:      Mental Status: She is alert and oriented to person, place, and time.               ED Course   Labs Reviewed - No data to display               XR KNEE (1 OR 2 VIEWS), RIGHT (CPT=73560)    Result Date: 4/7/2024  PROCEDURE:  XR KNEE (1 OR 2 VIEWS), RIGHT (CPT=73560)  COMPARISON:  None.  INDICATIONS:  right knee pain  PATIENT STATED HISTORY: (As transcribed by Technologist)  Patient states she was walking and stepped to the side and tweaked her right knee. Patient has pain to medial right knee.   FINDINGS:  No evidence of acute displaced fracture or dislocation.  Normal mineralization.  Unremarkable soft tissues.  Mild tricompartmental osteoarthritic changes noted.            CONCLUSION:  No evidence of acute displaced fracture or dislocation in the right knee.  Degenerative changes as above.  LOCATION:  [facility]   Dictated by (CST): Heath Resendiz MD on 4/07/2024 at 8:37 AM      Finalized by (CST): Heath Resendiz MD on 4/07/2024 at 8:37 AM         MDM     Please note that this report has been produced using speech recognition software and may contain errors related to that system including, but not limited to, errors in grammar, punctuation, and spelling, as well as words and phrases that possibly may have been recognized inappropriately.  If there are any questions or concerns, contact the dictating provider for clarification.        Note to patient: The 21st Century Cures Act makes medical notes like these available to patients in the interest of transparency. However, this is a medical document intended as peer to peer communication. It is written in medical language and may contain abbreviations or verbiage that are unfamiliar. It may appear blunt or direct. Medical documents are intended to carry relevant information, facts as evident, and the clinical opinion of the practitioner.                                   Medical Decision Making      Disposition and Plan     Clinical Impression:  1. Injury of right knee, initial encounter         Disposition:  Discharge  4/7/2024  8:44 am    Follow-up:  Ata Mcdonough MD  [address] [id number] [phone]    Schedule an appointment as soon as possible for a visit             Medications Prescribed:  Current Discharge Medication List

## 2024-08-11 ENCOUNTER — OFFICE VISIT (OUTPATIENT)
Dept: FAMILY MEDICINE CLINIC | Facility: CLINIC | Age: 54
End: 2024-08-11
Payer: COMMERCIAL

## 2024-08-11 VITALS
TEMPERATURE: 97 F | HEART RATE: 78 BPM | SYSTOLIC BLOOD PRESSURE: 120 MMHG | OXYGEN SATURATION: 96 % | RESPIRATION RATE: 18 BRPM | DIASTOLIC BLOOD PRESSURE: 89 MMHG

## 2024-08-11 DIAGNOSIS — R05.9 COUGH, UNSPECIFIED TYPE: Primary | ICD-10-CM

## 2024-08-11 DIAGNOSIS — U07.1 COVID-19: ICD-10-CM

## 2024-08-11 LAB
OPERATOR ID: ABNORMAL
POCT LOT NUMBER: ABNORMAL
RAPID SARS-COV-2 BY PCR: DETECTED

## 2024-08-11 RX ORDER — BENZONATATE 200 MG/1
200 CAPSULE ORAL 3 TIMES DAILY PRN
Qty: 20 CAPSULE | Refills: 0 | Status: SHIPPED | OUTPATIENT
Start: 2024-08-11 | End: 2024-08-18

## 2024-08-11 NOTE — PROGRESS NOTES
CHIEF COMPLAINT:     Chief Complaint   Patient presents with    Cough     Cough, fever, chills, body ache, temp - Entered by patient  Started yesterday        HPI:   Angelique Mccloud is a 54 year old female who presents for upper respiratory symptoms for  1 days. Patient reports cough, fever, chills, body aches. Symptoms have been persistent since onset.  Treating symptoms with otc  Exposed to covid      Current Outpatient Medications   Medication Sig Dispense Refill    methylPREDNISolone (MEDROL) 4 MG Oral Tablet Therapy Pack As directed. (Patient not taking: Reported on 4/7/2024) 1 each 0    topiramate 25 MG Oral Tab Take 1 tablet (25 mg total) by mouth nightly. (Patient not taking: Reported on 3/5/2024) 90 tablet 0    metoprolol succinate ER 25 MG Oral Tablet 24 Hr Take 0.5 tablets (12.5 mg total) by mouth daily.      Zinc 50 MG Oral Cap Take by mouth.      Omeprazole 20 MG Oral Tab EC Take by mouth. (Patient not taking: Reported on 3/5/2024)      metoprolol tartrate 25 MG Oral Tab Take 0.5 tablets (12.5 mg total) by mouth 2 (two) times daily.      Cholecalciferol (VITAMIN D) 125 MCG (5000 UT) Oral Cap Take 1 capsule (5,000 Units total) by mouth daily.      Bacillus Coagulans-Inulin (PROBIOTIC) 1-250 BILLION-MG Oral Cap Take 1 capsule by mouth daily.      Flecainide Acetate 50 MG Oral Tab Take 1 tablet (50 mg total) by mouth 2 (two) times daily.      aspirin 81 MG Oral Chew Tab Chew 1 tablet (81 mg total) by mouth daily.      Cyanocobalamin (VITAMIN B-12 OR) Take 1 capsule by mouth daily.        Magnesium Oxide 400 (240 MG) MG Oral Tab Take 1 tablet (400 mg total) by mouth daily.  3      Past Medical History:    A-fib (HCC)    Anxiety    Constipation    Heart palpitations    Obesity, unspecified    KARMA (obstructive sleep apnea)    AHI 5 REM AHI 10 Supine AHI 29 non-supine AHI 1 Sao2 Theodore 90%     Pain in joint, shoulder region    right    Palpitations    Presence of other cardiac implants and grafts    Stress     Ventricular tachycardia (HCC)    Wears glasses      Past Surgical History:   Procedure Laterality Date          x2    Colonoscopy           Social History     Socioeconomic History    Marital status:    Tobacco Use    Smoking status: Never    Smokeless tobacco: Never   Vaping Use    Vaping status: Never Used   Substance and Sexual Activity    Alcohol use: Not Currently     Alcohol/week: 0.0 standard drinks of alcohol     Comment: rarely    Drug use: No    Sexual activity: Yes     Partners: Male     Birth control/protection: Condom     Comment: ablation    Other Topics Concern    Caffeine Concern No    Stress Concern Yes     Comment: pretty high daily    Special Diet No    Exercise No     Social Determinants of Health     Physical Activity: Sufficiently Active (5/15/2019)    Received from [employer]    Exercise Vital Sign     Days of Exercise per Week: 4 days     Minutes of Exercise per Session: 60 min         REVIEW OF SYSTEMS:   GENERAL: decreased appetite  SKIN: no rashes or abnormal skin lesions  HEENT: See HPI  LUNGS: See HPI  CARDIOVASCULAR: denies chest pain or palpitations   GI: denies N/V/C or abdominal pain      EXAM:   /89   Pulse 78   Temp 97.2 °F (36.2 °C)   Resp 18   LMP 2013   SpO2 96%   GENERAL: well developed, well nourished,in no apparent distress  SKIN: no rashes,  HEAD: atraumatic, normocephalic.  no tenderness on palpation of sinuses  EYES: conjunctiva clear, EOM intact  EARS: TM's grey, no bulging, no retraction, no fluid, bony landmarks visible  NOSE: Nostrils patent, no nasal discharge,   NECK: Supple, non-tender  LUNGS: clear to auscultation bilaterally, no wheezes or rhonchi. Breathing is non labored.  CARDIO: RRR without murmur  EXTREMITIES: no cyanosis, clubbing or edema  LYMPH:  no cervical lymphadenopathy.        ASSESSMENT AND PLAN:   Angelique Mccloud is a 54 year old female who presents with upper respiratory  symptoms that are consistent with    ASSESSMENT:   Encounter Diagnoses   Name Primary?    Cough, unspecified type Yes    COVID-19        PLAN:   Covid rapid +  Pt defers paxlovid  Rest, push fluids, supportive care  Reveiwed CDC guidelines  Comfort care as described in Patient Instructions  The patient indicates understanding of these issues and agrees to the plan.  The patient is asked to f/u with PCP if sx's persist or worsen.  There are no Patient Instructions on file for this visit.

## 2024-08-13 ENCOUNTER — TELEPHONE (OUTPATIENT)
Dept: INTERNAL MEDICINE CLINIC | Facility: HOSPITAL | Age: 54
End: 2024-08-13

## 2024-08-13 NOTE — TELEPHONE ENCOUNTER
Outside Covid Testing done    Results and RTW guidelines:    COVID RESULT reported:      Test type:    [x] Rapid outside         [] PCR outside       [] Home Test    Date of test: 08/11/2024     Test location: [facility]         [] Result viewed in Epic with verbal consent received from employee     [x] Results per Employee Covid Dashboard    [] Employee instructed to email copy of result to [email or web address]       [] Discussed with employee     [] Unable to reach by phone.  Sent via Viro[employer] message    [x] Positive    - Employee should quarantine at home for at least 5 days (day 1 is day after sx onset) , follow the CDC guidelines for cleaning and                              quarantining; see CDC.gov   -This employee may RTW on day 6 if asymptomatic or mildly symptomatic (with improving symptoms).  Call Employee Health on day 5 if unable to return on                      day 6 after symptom onset.    -This employee needs to call Employee Health on day 5 after symptom onset.  The employee needs to be cleared by Employee Health.  - If Employee is still experiencing severe symptoms must make a RTW appt with Employee Health, Employee will not be cleared if:    1. Has consistent cough, shortness of breath or fatigue that restricts your physical activities    2. Is still feeling \"unwell\"    3. Within 15 days of hospitalization for COVID    4. Within 20 days of intubation for COVID    5. Still has a fever, vomiting or diarrhea   - Keep communication open with management about RTW and if symptoms worsen    - Monitor sx and temperature    - Employee should quarantine at home for at least 5 days from sx onset, follow the CDC guidelines for cleaning and quarantining; see CDC.gov                  - Notify PCP of result                 - Seek emergent care with worsening symptoms        Notes:     RTW PLAN:    [x]  If COVID positive results, off work minimum of 5 days from positive test or onset of  symptoms (day 0)        On day 5, if asymptomatic or mildly symptomatic (with improving symptoms) may return to work day 6          On day 5, if symptomatic, call Employee Health for RTW screening        []  COVID positive result - call Employee Health on day 5 after symptom onset.  The employee needs to be cleared by Employee Health to RTW.  [] RTW immediately, continue to monitor for sx  [] RTW when sx improve; must be fever free for 24 hours w/o medications, Diarrhea/Vomiting free for 24 hours w/o medications  [] Alinity ordered; continue to monitor sx and call for new/worsening sx.  Discuss RTW guidelines with manager             [] Rapid ordered to confirm home negative.   [] May continue to work  [x] Follow up with PCP  [] Home until further instruction from hotline with Alinity results  INSTRUCTIONS PROVIDED:  [x]  Plan as noted above  []  Length of time to obtain results  []  May return to work if views negative in My Chart and  remains fever, vomiting, and diarrhea free  []  May continue to work if remains asymptomatic   [x]  Quarantine instructions  [x]  Masking protocol  []  S/S of worsening infection/condition and importance of prompt medical re-evaluation including when to seek emergency care  [] If symptoms develop, stay home and call hotline for rapid test order    Estimated RTW date:  08/16/2024  [x] Manager notified        [x] EH  []NICHOLAS   [] [facility]  Manager : Devroa Bowen    [x] Direct Patient Care  []Indirect Patient Contact   [] Non-Clinical/No Patient Contact    High Risk Area:    [] Yes   [x] No    For Direct Patient Care ONLY: Have you been fitted with an N95 mask? [x] Yes  []No      HAVE YOU RECEIVED THE COVID-19 Vaccine? Yes [x]    No []          If yes, date(s) received:  08/18/2021 09/09/2021           Which vaccine:  Pfizer [x]     Moderna []    J&J []      SYMPTOMS (reported via dashboard):  [] asymptomatic  [x] symptomatic  [] GI symptoms only    Symptom onset date: 08/10/2024    Fever   >  100F             Yes [x]      Cough                          Yes [x]      Shortness of breath  Yes []      Congestion                 Yes [x]      Runny nose                Yes [x]        Loss of Smell              Yes []        Loss of Taste             Yes []       Sore throat                 Yes [x]       Fatigue                        Yes [x]       Body Aches                Yes [x]        Chills                           Yes [x]        Headache                   Yes [x]             GI symptoms             Yes []     No [x]                     Nausea   []          Vomiting            []                                    Diarrhea  []          Upset stomach []      Employee reported COVID Exposure?  Yes []     No [x]    Date of exposure:   []  Coworker                       [] patient                        [] Family/friend    PPE:   [] N95 Mask/PAPR  [] Standard Mask  [] Eyewear  [] None    Within 6 feet for >15 minutes? [] Yes []  No    Is this a true exposure? []  Yes []  No    When was the last shift you worked?: 08/09/2024    Employee has a history of Covid?  Yes []     No [x]   If Yes, when:    Notes:

## 2024-08-14 ENCOUNTER — TELEMEDICINE (OUTPATIENT)
Dept: INTERNAL MEDICINE CLINIC | Facility: CLINIC | Age: 54
End: 2024-08-14
Payer: COMMERCIAL

## 2024-08-14 DIAGNOSIS — U07.1 COVID: ICD-10-CM

## 2024-08-14 DIAGNOSIS — J01.90 ACUTE NON-RECURRENT SINUSITIS, UNSPECIFIED LOCATION: Primary | ICD-10-CM

## 2024-08-14 PROCEDURE — 99213 OFFICE O/P EST LOW 20 MIN: CPT | Performed by: FAMILY MEDICINE

## 2024-08-14 RX ORDER — AMOXICILLIN AND CLAVULANATE POTASSIUM 875; 125 MG/1; MG/1
1 TABLET, FILM COATED ORAL 2 TIMES DAILY
Qty: 20 TABLET | Refills: 0 | Status: SHIPPED | OUTPATIENT
Start: 2024-08-14 | End: 2024-08-24

## 2024-08-14 RX ORDER — METHYLPREDNISOLONE 4 MG/1
TABLET ORAL
Qty: 1 EACH | Refills: 0 | Status: SHIPPED | OUTPATIENT
Start: 2024-08-14

## 2024-08-14 NOTE — PROGRESS NOTES
Virtual Video Check-In     This visit is conducted using Telemedicine with live, interactive video and audio.    Angelique Mccloud, who has verified his/her identification by name and , verbally consents to a Virtual/Telephone Check-In visit on 24.  Patient confirms that he/she is in the [facility] at the time of service.  Patient understands and accepts financial responsibility for any deductible, co-insurance and/or co-pays associated with this service.    TIME: 9 minutes      HPI: The patient wishes to address some covid/ respiratory symptoms. Has had them for 4  days. Patient reports congestion, yellow colored nasal discharge, fever with Tmax to 102, cough with yellow colored sputum, ear pain, OTC cold meds have not been helping, PND, hoarseness,headaches, sore throat,fatigue, and dizziness. Patient tested for covid on . [facility] . Pt was given Tessalon. Pt unable to take Paxlovid because she is on Flecainide for her A-fib.  Pt feels her symptoms are worsening and going into a sinus infection with all the yellow mucus she has been having.      ROS:  GENERAL:+ fever, chills, fatigue  SKIN: Denies rashes, skin lesions  EYES: Denies blurred vision or double vision  HENT: see HPI  CHEST: Denies chest pain, or palpitations  LUNGS: Denies shortness of breath,wheezing,+ cough  GI: Denies abdominal pain, N/V/C/D.   MUSCULOSKELETAL: Denies any arthralgia,myalgias or swollen joints  LYMPH:  Denies lymphadenopathy  NEURO:  + headaches and lightheadedness.   PSYCH: denies depression or anxiety    ALLERGIES:  No Known Allergies    Current Outpatient Medications   Medication Sig Dispense Refill    amoxicillin clavulanate 875-125 MG Oral Tab Take 1 tablet by mouth 2 (two) times daily for 10 days. 20 tablet 0    methylPREDNISolone (MEDROL) 4 MG Oral Tablet Therapy Pack As directed. 1 each 0    benzonatate 200 MG Oral Cap Take 1 capsule (200 mg total) by mouth 3 (three) times daily as  needed for cough. 20 capsule 0    methylPREDNISolone (MEDROL) 4 MG Oral Tablet Therapy Pack As directed. (Patient not taking: Reported on 2024) 1 each 0    topiramate 25 MG Oral Tab Take 1 tablet (25 mg total) by mouth nightly. (Patient not taking: Reported on 3/5/2024) 90 tablet 0    metoprolol succinate ER 25 MG Oral Tablet 24 Hr Take 0.5 tablets (12.5 mg total) by mouth daily.      Zinc 50 MG Oral Cap Take by mouth.      Omeprazole 20 MG Oral Tab EC Take by mouth. (Patient not taking: Reported on 3/5/2024)      metoprolol tartrate 25 MG Oral Tab Take 0.5 tablets (12.5 mg total) by mouth 2 (two) times daily.      Cholecalciferol (VITAMIN D) 125 MCG (5000 UT) Oral Cap Take 1 capsule (5,000 Units total) by mouth daily.      Bacillus Coagulans-Inulin (PROBIOTIC) 1-250 BILLION-MG Oral Cap Take 1 capsule by mouth daily.      Flecainide Acetate 50 MG Oral Tab Take 1 tablet (50 mg total) by mouth 2 (two) times daily.      aspirin 81 MG Oral Chew Tab Chew 1 tablet (81 mg total) by mouth daily.      Cyanocobalamin (VITAMIN B-12 OR) Take 1 capsule by mouth daily.        Magnesium Oxide 400 (240 MG) MG Oral Tab Take 1 tablet (400 mg total) by mouth daily.  3       Past Medical History:    A-fib (HCC)    Anxiety    Constipation    Heart palpitations    Obesity, unspecified    KARMA (obstructive sleep apnea)    AHI 5 REM AHI 10 Supine AHI 29 non-supine AHI 1 Sao2 Theodore 90%     Pain in joint, shoulder region    right    Palpitations    Presence of other cardiac implants and grafts    Stress    Ventricular tachycardia (HCC)    Wears glasses      Past Surgical History:   Procedure Laterality Date          x2    Colonoscopy        Social History     Socioeconomic History    Marital status:    Tobacco Use    Smoking status: Never    Smokeless tobacco: Never   Vaping Use    Vaping status: Never Used   Substance and Sexual Activity    Alcohol use: Not Currently     Alcohol/week: 0.0 standard drinks of alcohol      Comment: rarely    Drug use: No    Sexual activity: Yes     Partners: Male     Birth control/protection: Condom     Comment: ablation    Other Topics Concern    Caffeine Concern No    Stress Concern Yes     Comment: pretty high daily    Special Diet No    Exercise No     Social Determinants of Health     Physical Activity: Sufficiently Active (5/15/2019)    Received from [email or web address], [email or web address]    Exercise Vital Sign     Days of Exercise per Week: 4 days     Minutes of Exercise per Session: 60 min       Family History   Problem Relation Age of Onset    Cancer Father         colon?    Diabetes Father     Crohn's Disease Father     Hypertension Father     Ulcerative Colitis Mother     Ulcerative Colitis Son     Cancer Paternal Grandmother         mandibular cancer    Diabetes Sister     Heart Attack Sister         PE:  No vital signs were taken for this video visit.  GENERAL: well developed, well nourished, in no acute distress  SKIN: no rashes on visible parts of skin  HEENT: normocephalic, atraumatic, conjunctiva clear. Pt has audible congestion as she speaks.  LUNGS: regular breathing rate and effort with no audible respiratory distress. Hacky cough noted.  NEURO: A&Ox3  PSYCH: pleasant mood and affect, appropriate judgement and insight    ASSESSMENT/PLAN:    Encounter Diagnoses   Name Primary?    COVID     Acute non-recurrent sinusitis, unspecified location Yes       No orders of the defined types were placed in this encounter.      Meds & Refills for this Visit:  Requested Prescriptions     Signed Prescriptions Disp Refills    amoxicillin clavulanate 875-125 MG Oral Tab 20 tablet 0     Sig: Take 1 tablet by mouth 2 (two) times daily for 10 days.    methylPREDNISolone (MEDROL) 4 MG Oral Tablet Therapy Pack 1 each 0     Sig: As directed.       Imaging & Consults:  None    SINUSITIS  - rest, fluids, start the Augmentin and medrol dose adin. Pt to take the medications with food.    COVID  1.  Self quarantine at home for the next 5 days or until 24 hours fever free without fever reducing medications; whichever is longer. Ok to Return to work on day 6 but needs to wear a mask for the next 5 days. ( Or per work protocol)  2. Take acetaminophen  for fever or body aches.   3. Stay hydrated by increasing your water intake, tea with lemon and honey.  4. Follow a bland diet (bananas, rice, applesauce, toast) avoiding spicy, greasy foods, dairy or caffeine if diarrhea occurs.   5. Disinfect your hand frequently with soap and water for at least 20 seconds or an alcohol based  with at least 60% alcohol.   6. Disinfect frequently touched surfaces often.   7. If you should develop shortness of breath,wheezing to where you cannot complete a sentence or there is any blue discoloration to your lips go straight to the emergency room.   8. If you are not having any improvement, worsening of symptoms, questions or concerns please call the office.      *Please note that the following visit was completed using two-way, real-time interactive audio and/or video communication.  This has been done in good adriana to provide continuity of care in the best interest of the provider-patient relationship, due to the ongoing public health crisis/national emergency and because of restrictions of visitation.  There are limitations of this visit as physical exam could not be fully performed.  Every conscious effort was taken to allow for sufficient and adequate time.  Included in this visit, time may have been spent reviewing labs, medications, radiology tests and decision-making.  Appropriate medical decision-making and tests are ordered as detailed in the plan of care above.  Coding/billing information is submitted for this visit based on complexity of care and/or time spent for the visit.      Start Time: 10:13 AM  End Time: 10:22 AM    Zayda CLINTON

## 2024-08-29 ENCOUNTER — LAB ENCOUNTER (OUTPATIENT)
Dept: LAB | Facility: HOSPITAL | Age: 54
End: 2024-08-29
Attending: FAMILY MEDICINE
Payer: COMMERCIAL

## 2024-08-29 DIAGNOSIS — R19.7 DIARRHEA, UNSPECIFIED TYPE: ICD-10-CM

## 2024-08-29 PROCEDURE — 87493 C DIFF AMPLIFIED PROBE: CPT

## 2024-08-30 LAB — C DIFF TOX B STL QL: NEGATIVE

## 2024-10-29 ENCOUNTER — TELEPHONE (OUTPATIENT)
Dept: INTERNAL MEDICINE CLINIC | Facility: HOSPITAL | Age: 54
End: 2024-10-29

## 2024-10-29 DIAGNOSIS — Z20.822 EXPOSURE TO COVID-19 VIRUS: Primary | ICD-10-CM

## 2024-12-16 ENCOUNTER — APPOINTMENT (OUTPATIENT)
Dept: GENERAL RADIOLOGY | Facility: HOSPITAL | Age: 54
End: 2024-12-16
Attending: EMERGENCY MEDICINE
Payer: COMMERCIAL

## 2024-12-16 ENCOUNTER — APPOINTMENT (OUTPATIENT)
Dept: CV DIAGNOSTICS | Facility: HOSPITAL | Age: 54
End: 2024-12-16
Attending: EMERGENCY MEDICINE
Payer: COMMERCIAL

## 2024-12-16 ENCOUNTER — HOSPITAL ENCOUNTER (EMERGENCY)
Facility: HOSPITAL | Age: 54
Discharge: HOME | End: 2024-12-16
Attending: EMERGENCY MEDICINE
Payer: COMMERCIAL

## 2024-12-16 VITALS
OXYGEN SATURATION: 100 % | SYSTOLIC BLOOD PRESSURE: 122 MMHG | WEIGHT: 227 LBS | HEART RATE: 67 BPM | RESPIRATION RATE: 17 BRPM | TEMPERATURE: 98 F | BODY MASS INDEX: 40 KG/M2 | DIASTOLIC BLOOD PRESSURE: 76 MMHG

## 2024-12-16 DIAGNOSIS — R07.9 CHEST PAIN OF UNCERTAIN ETIOLOGY: Primary | ICD-10-CM

## 2024-12-16 LAB
ALBUMIN SERPL-MCNC: 4.4 G/DL (ref 3.2–4.8)
ALBUMIN/GLOB SERPL: 1.6 {RATIO} (ref 1–2)
ALP LIVER SERPL-CCNC: 104 U/L
ALT SERPL-CCNC: 17 U/L
ANION GAP SERPL CALC-SCNC: 10 MMOL/L (ref 0–18)
AST SERPL-CCNC: 22 U/L (ref ?–34)
ATRIAL RATE: 71 BPM
ATRIAL RATE: 98 BPM
BASOPHILS # BLD AUTO: 0.03 X10(3) UL (ref 0–0.2)
BASOPHILS NFR BLD AUTO: 0.6 %
BILIRUB SERPL-MCNC: 0.5 MG/DL (ref 0.3–1.2)
BUN BLD-MCNC: 8 MG/DL (ref 9–23)
CALCIUM BLD-MCNC: 9.5 MG/DL (ref 8.7–10.4)
CHLORIDE SERPL-SCNC: 108 MMOL/L (ref 98–112)
CO2 SERPL-SCNC: 25 MMOL/L (ref 21–32)
CREAT BLD-MCNC: 0.73 MG/DL
D DIMER PPP FEU-MCNC: 0.49 UG/ML FEU (ref ?–0.54)
EGFRCR SERPLBLD CKD-EPI 2021: 98 ML/MIN/1.73M2 (ref 60–?)
EOSINOPHIL # BLD AUTO: 0.07 X10(3) UL (ref 0–0.7)
EOSINOPHIL NFR BLD AUTO: 1.3 %
ERYTHROCYTE [DISTWIDTH] IN BLOOD BY AUTOMATED COUNT: 12.4 %
GLOBULIN PLAS-MCNC: 2.8 G/DL (ref 2–3.5)
GLUCOSE BLD-MCNC: 94 MG/DL (ref 70–99)
GLUCOSE BLD-MCNC: 99 MG/DL (ref 70–99)
HCT VFR BLD AUTO: 45.1 %
HGB BLD-MCNC: 15.1 G/DL
IMM GRANULOCYTES # BLD AUTO: 0.01 X10(3) UL (ref 0–1)
IMM GRANULOCYTES NFR BLD: 0.2 %
LYMPHOCYTES # BLD AUTO: 1.49 X10(3) UL (ref 1–4)
LYMPHOCYTES NFR BLD AUTO: 28.5 %
MCH RBC QN AUTO: 30.1 PG (ref 26–34)
MCHC RBC AUTO-ENTMCNC: 33.5 G/DL (ref 31–37)
MCV RBC AUTO: 90 FL
MONOCYTES # BLD AUTO: 0.38 X10(3) UL (ref 0.1–1)
MONOCYTES NFR BLD AUTO: 7.3 %
NEUTROPHILS # BLD AUTO: 3.25 X10 (3) UL (ref 1.5–7.7)
NEUTROPHILS # BLD AUTO: 3.25 X10(3) UL (ref 1.5–7.7)
NEUTROPHILS NFR BLD AUTO: 62.1 %
OSMOLALITY SERPL CALC.SUM OF ELEC: 294 MOSM/KG (ref 275–295)
P AXIS: 68 DEGREES
P AXIS: 69 DEGREES
P-R INTERVAL: 144 MS
P-R INTERVAL: 146 MS
PLATELET # BLD AUTO: 261 10(3)UL (ref 150–450)
POTASSIUM SERPL-SCNC: 3.7 MMOL/L (ref 3.5–5.1)
PROT SERPL-MCNC: 7.2 G/DL (ref 5.7–8.2)
Q-T INTERVAL: 362 MS
Q-T INTERVAL: 396 MS
QRS DURATION: 86 MS
QRS DURATION: 92 MS
QTC CALCULATION (BEZET): 430 MS
QTC CALCULATION (BEZET): 462 MS
R AXIS: -8 DEGREES
R AXIS: 9 DEGREES
RBC # BLD AUTO: 5.01 X10(6)UL
SODIUM SERPL-SCNC: 143 MMOL/L (ref 136–145)
T AXIS: 16 DEGREES
T AXIS: 41 DEGREES
TROPONIN I SERPL HS-MCNC: <3 NG/L
TROPONIN I SERPL HS-MCNC: <3 NG/L
VENTRICULAR RATE: 71 BPM
VENTRICULAR RATE: 98 BPM
WBC # BLD AUTO: 5.2 X10(3) UL (ref 4–11)

## 2024-12-16 PROCEDURE — 71045 X-RAY EXAM CHEST 1 VIEW: CPT | Performed by: EMERGENCY MEDICINE

## 2024-12-16 PROCEDURE — 99284 EMERGENCY DEPT VISIT MOD MDM: CPT

## 2024-12-16 PROCEDURE — 96360 HYDRATION IV INFUSION INIT: CPT

## 2024-12-16 PROCEDURE — 99285 EMERGENCY DEPT VISIT HI MDM: CPT

## 2024-12-16 PROCEDURE — 85379 FIBRIN DEGRADATION QUANT: CPT | Performed by: EMERGENCY MEDICINE

## 2024-12-16 PROCEDURE — 93350 STRESS TTE ONLY: CPT | Performed by: EMERGENCY MEDICINE

## 2024-12-16 PROCEDURE — 93005 ELECTROCARDIOGRAM TRACING: CPT

## 2024-12-16 PROCEDURE — 93018 CV STRESS TEST I&R ONLY: CPT | Performed by: EMERGENCY MEDICINE

## 2024-12-16 PROCEDURE — 85025 COMPLETE CBC W/AUTO DIFF WBC: CPT | Performed by: EMERGENCY MEDICINE

## 2024-12-16 PROCEDURE — 93017 CV STRESS TEST TRACING ONLY: CPT | Performed by: EMERGENCY MEDICINE

## 2024-12-16 PROCEDURE — 96361 HYDRATE IV INFUSION ADD-ON: CPT

## 2024-12-16 PROCEDURE — 80053 COMPREHEN METABOLIC PANEL: CPT | Performed by: EMERGENCY MEDICINE

## 2024-12-16 PROCEDURE — 82962 GLUCOSE BLOOD TEST: CPT

## 2024-12-16 PROCEDURE — 84484 ASSAY OF TROPONIN QUANT: CPT | Performed by: EMERGENCY MEDICINE

## 2024-12-16 PROCEDURE — 93010 ELECTROCARDIOGRAM REPORT: CPT

## 2024-12-16 RX ORDER — SODIUM CHLORIDE 9 MG/ML
125 INJECTION, SOLUTION INTRAVENOUS CONTINUOUS
Status: DISCONTINUED | OUTPATIENT
Start: 2024-12-16 | End: 2024-12-16

## 2024-12-16 RX ORDER — NITROGLYCERIN 0.4 MG/1
0.4 TABLET SUBLINGUAL ONCE
Status: COMPLETED | OUTPATIENT
Start: 2024-12-16 | End: 2024-12-16

## 2024-12-16 RX ORDER — ASPIRIN 81 MG/1
324 TABLET, CHEWABLE ORAL ONCE
Status: COMPLETED | OUTPATIENT
Start: 2024-12-16 | End: 2024-12-16

## 2024-12-16 NOTE — PROGRESS NOTES
Pt completed stress echo, pt walked for 6:59 minutes achieving 82% APMHR, pt c/o 1/10 left sided chest pressure at rest with no change during exercise, pt escorted back to ER via wheelchair. RN and [facility] Notified. Echo pending.

## 2024-12-16 NOTE — DISCHARGE INSTRUCTIONS
Follow-up for further evaluation with primary physician and cardiology.  Call for an appointment.  Return if syncope, heart rate above 110, palpitations, new or worse symptoms.  Prilosec over-the-counter as discussed.  Continue present medications

## 2024-12-16 NOTE — CONSULTS
Cardiology Consultation    Angelique Mccloud Patient Status:  Emergency    1970 MRN [id number]   [facility] EMERGENCY DEPARTMENT Attending Jose Guy MD   Hosp Day # 0 PCP Lu Rock MD     Reason for Consultation:  chest pain      History of Present Illness:  Angelique Mccloud is a a(n) 54 year old female. Very nice obese woman, here with her daughter.  She has PAF, ablated in 2018 by Dr. Pringle.  She has symptomatic PAC's, remains on flecainide and metoprolol.  She has chronic intermittent chest pains, but last evening she developed a more diffuse left sided chest pain and periscapular pain.  She slept all night and woke this morning, still with ongoing sx's.  She received SL NTG in the ER and had transient bradycardia and hypotension, responding to saline bolus.  Still with mild chest pain.  Two troponins are normal.  EKG's w/o ischemia.    History:  Past Medical History:    A-fib (HCC)    Anxiety    Constipation    Heart palpitations    Obesity, unspecified    KARMA (obstructive sleep apnea)    AHI 5 REM AHI 10 Supine AHI 29 non-supine AHI 1 Sao2 Theodore 90%     Pain in joint, shoulder region    right    Palpitations    Presence of other cardiac implants and grafts    Stress    Ventricular tachycardia (HCC)    Wears glasses     Past Surgical History:   Procedure Laterality Date          x2    Colonoscopy       Family History   Problem Relation Age of Onset    Cancer Father         colon?    Diabetes Father     Crohn's Disease Father     Hypertension Father     Ulcerative Colitis Mother     Ulcerative Colitis Son     Cancer Paternal Grandmother         mandibular cancer    Diabetes Sister     Heart Attack Sister          Allergies:  Allergies[1]    Medications:      Continuous Infusions:   sodium chloride 125 mL/hr (24 0716)       Social History:   reports that she has never smoked. She has never used smokeless tobacco. She reports that she does not currently use alcohol.  She reports that she does not use drugs.    Review of Systems:  All systems were reviewed and are negative except as described above in HPI.    Physical Exam:      Temp:  [98 °F (36.7 °C)] 98 °F (36.7 °C)  Pulse:  [] 67  Resp:  [16-30] 19  BP: ()/() 106/76  SpO2:  [96 %-100 %] 99 %    Last 3 Weights   12/16/24 0621 227 lb (103 kg)   04/07/24 0819 210 lb (95.3 kg)   03/05/24 0834 222 lb 9.6 oz (101 kg)           General: No apparent distress  HEENT: No focal deficits.  Neck: supple. JVP normal  Cardiac: Regular rhythm, S1, S2 normal,   No rub or gallop.  No murmur.  Lungs: CTA  Abdomen: Soft, non-tender.   Extremities: No edema  Neurologic: no focal deficits  Skin: Warm and dry.          Telemetry: sinus    Laboratories and Data:  Diagnostics:    EKG, 12/16/2024:  both reviewed    CXR, 12/16/2024:  neg    Labs:   HEM:  Recent Labs   Lab 12/16/24  0703   WBC 5.2   HGB 15.1   .0       Chem:  Recent Labs   Lab 12/16/24  0642      K 3.7      CO2 25.0   BUN 8*   CREATSERUM 0.73   CA 9.5   GLU 99       Recent Labs   Lab 12/16/24  0642   ALT 17   AST 22   ALB 4.4       No results for input(s): \"PTP\", \"INR\" in the last 168 hours.    No results for input(s): \"TROP\", \"CK\" in the last 168 hours.      Impression:   Chest/periscapular pain - prolonged sx's with normal troponins.  PAF, ablated in 2018.  Symptomatic PAC's - on flecanide and metoprolol.    Plan:  Agree with stress echo.  If unremarkable I feel comfortable with ER dc from cardiac standpoint.    Agustín Vigil MD  12/16/2024  9:56 AM  C4         [1] No Known Allergies

## 2024-12-16 NOTE — ED PROVIDER NOTES
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 tach when she was pregnant.  Patient also has history of A-fib but had an ablation in 2019.  Patient states she is seen by cardiology Dr. Pringle.  Patient states she has had some intermittent dull chest discomfort for the last week.  Patient states would come and go.  Patient states she had some chest pain last night and noticed when she woke up this morning around 6 AM.  Patient states it did radiate to her back.  Patient denies back pain currently, no shortness of breath, no nausea vomiting or diaphoresis.  Patient does have some discomfort to her arms states she is not sure if it is from chest pain or her carpal tunnel.  Patient denies abdominal pain, leg pain or leg swelling.  No shortness of breath, no pain with deep breaths.  Patient denies chest pain with exertion.  Patient rates pain 2 out of 10 dull ache.  Remainder of review of systems negative.  Patient was up at her floor felt lightheaded and had a low blood pressure 78/40 and was brought to the emergency department by staff.  Patient works in the hospital.    Objective:     Past Medical History:    A-fib (HCC)    Anxiety    Constipation    Heart palpitations    Obesity, unspecified    KARMA (obstructive sleep apnea)    AHI 5 REM AHI 10 Supine AHI 29 non-supine AHI 1 Sao2 Theodore 90%     Pain in joint, shoulder region    right    Palpitations    Presence of other cardiac implants and grafts    Stress    Ventricular tachycardia (HCC)    Wears glasses          No hypertension, no diabetes, high cholesterol.  Patient states that has been roughly 8 years since she had a stress test.  No previous coronary artery  disease.  Does have arrhythmias    Past Surgical History:   Procedure Laterality Date          x2    Colonoscopy                  Social History     Socioeconomic History    Marital status:    Tobacco Use    Smoking status: Never    Smokeless tobacco: Never   Vaping Use    Vaping status: Never Used   Substance and Sexual Activity    Alcohol use: Not Currently     Alcohol/week: 0.0 standard drinks of alcohol     Comment: rarely    Drug use: No    Sexual activity: Yes     Partners: Male     Birth control/protection: Condom     Comment: ablation    Other Topics Concern    Caffeine Concern No    Stress Concern Yes     Comment: pretty high daily    Special Diet No    Exercise No     Social Drivers of Health     Physical Activity: Sufficiently Active (5/15/2019)    [facility]    Exercise Vital Sign     Days of Exercise per Week: 4 days     Minutes of Exercise per Session: 60 min          Family history no history of premature coronary disease        Physical Exam     ED Triage Vitals [24 0621]   /85   Pulse 100   Resp (!) 30   Temp 98 °F (36.7 °C)   Temp src Oral   SpO2 100 %   O2 Device None (Room air)       Current Vitals:   Vital Signs  BP: 122/76  Pulse: 67  Resp: 17  Temp: 98 °F (36.7 °C)  Temp src: Oral  MAP (mmHg): 91    Oxygen Therapy  SpO2: 100 %  O2 Device: None (Room air)        Physical Exam   GENERAL: Patient resting on the cart in no acute distress.  HEENT: Extraocular muscles intact, pupils equal round reactive to light.  Mouth normal, neck supple, no meningismus.  LUNGS: Lungs clear to auscultation bilaterally.  CARDIOVASCULAR: + S1-S2, regular rate and rhythm, no murmurs.  BACK: No CVA tenderness, no midline bony tenderness.  ABDOMEN: + Bowel sounds, soft, nontender, nondistended.  No rebound, no guarding, no hepatosplenomegaly.  EXTREMITIES: Full range of motion, no tenderness, good capillary refill.  No calf tenderness or  edema  SKIN: No rash, good turgor.  NEURO: Patient answers questions appropriately.  No focal deficits appreciated.  Conversant        ED Course     Labs Reviewed   COMP METABOLIC PANEL (14) - Abnormal; Notable for the following components:       Result Value    BUN 8 (*)     All other components within normal limits   TROPONIN I HIGH SENSITIVITY - Normal   D-DIMER - Normal   TROPONIN I HIGH SENSITIVITY - Normal   POCT GLUCOSE - Normal   CBC WITH DIFFERENTIAL WITH PLATELET   RAINBOW DRAW LAVENDER   RAINBOW DRAW LIGHT GREEN   RAINBOW DRAW BLUE   RAINBOW DRAW GOLD     EKG    Rate, intervals and axes as noted on EKG Report.  Rate: 98  Rhythm: Sinus Rhythm  Reading: Normal sinus rhythm, ST depressions laterally similar to previous EKG from 22                Chest x-ray   Stable cardiac and mediastinal contours.  The lungs and pleural spaces are clear.  No acute osseous findings.         Independent reviewed by myself, no pneumothorax       MDM      Patient given aspirin.  Patient blood pressure was elevated with her chest pain was given nitro.  After nitro was given patient's heart rate did drop to the 40s and blood pressure dropped patient felt briefly lightheaded.  This did respond to IV fluids.  Patient's chest pain however did resolve.  Patient was seen by cardiology in the emergency department.  Plan for stress echo.  Patient did have a negative stress echo.  Patient was normal sinus rhythm on the monitor.  Patient felt comfortable going home.  Patient vies take Prilosec over-the-counter.  Continue present medications.  For further evaluation primary physician and cardiology.  Return if new or worse symptoms.  Did consider ACS, PE, pneumothorax, GERD.    Medical Decision Making      Disposition and Plan     Clinical Impression:  1. Chest pain of uncertain etiology         Disposition:  Discharge  12/16/2024  1:07 pm    Follow-up:  Lu Rock MD  [address] [facility] [facility] [address]  Counts include 234 beds at the [facility]  [address]  [phone]    Follow up in 2 day(s)      Natalie Pringle MD  [address] [address]  [phone]    Follow up in 2 day(s)            Medications Prescribed:  Discharge Medication List as of 12/16/2024  1:09 PM              Supplementary Documentation:

## 2024-12-16 NOTE — HISTORICAL OFFICE NOTE
Facility Logo [facility]  [address] [facility], 4th [place] [address]  [phone]      Angelique Mccloud  Progress Note  Demographics:  Name: Angelique Mccloud YOB: 1970  Age: 54, Female Medical Record No: [id number]  Visited Date/Time: 02/28/2024 08:10 AM    Chief Complaints  follow up visit    History of Present Illness  Angelique Mccloud is a 53 year old woman with PMHx of PAF status post ablation with symptoms to suggest recurrence although this has not been documented on telemetry. She is status post LINQ monitor placement, no episodes of AF noted on follow up. LINQ monitor has been removed.    She presented to the ER with palpitations, was in SR, having some ectopy on tele, no AF. We started metoprolol on her last visit which has helped with symptoms of fluttering.     She had low BP, also had an episode of syncope, no recurrent events since her last visit.     Cardiac risk factors Never smoked  Past Medical History  1.History of atrial fibrillation  2.Encounter for monitoring flecainide therapy  Past Surgical History  1.S/P ablation of atrial fibrillation  2.Encounter for loop recorder at end of battery life  Family History  1. Father - No family history of coronary artery disease  Social History  Smoking status Never smoked  Tobacco usage - No (Non-smoker (finding))  Review of systems  Cardiovascular Palpitations  No history of Chest pain, ALLRED, Syncope, PND, Orthopnea, Edema and Claudication  Physical Examination  Vitals Right Arm Sitting  / 68 mmHg, Pulse rate 70 bpm, Regular, Height in 5' 3\", BMI: 39.5, Weight in 223 lbs (or) 101 kgs and BSA : 2.17 cc/m²  General Appearance No Acute Distress  Head/Eyes/Ears/Nose/Mouth/Throat Conjunctiva pink, Sclera Clear and Mucous membranes Moist  Neck Normal carotid pulsations, No carotid bruits and No JVD  Respiratory Unlabored, Lungs clear with normal breath sounds and Equal bilaterally  Cardiovascular Intact distal  pulses and Regular rhythm. Normal rate present. Normal and normal S1 and S2    Allergies  No medication allergies noted.  Medications  1.aspirin 81 MG tablet, 1 tablet daily  2.Cholecalciferol (VITAMIN D) 2000 units capsule, Take 1 capsule by mouth.  3.flecainide 50 mg tablet, Take 1 tablet orally 2 times a day.  4.magnesium oxide (MAG-OX) 400 MG tablet, 1 tablet daily.  5.metoprolol succinate ER 25 mg tablet,extended release 24 hr, Take one-half tablet orally once a day.  6.Zinc-15 66 mg tablet, Take 1 tablet orally once a day.  Impression  1.S/P ablation of atrial fibrillation  2.History of atrial fibrillation  Assessment & Plan  Angelique Mccloud is a 53 year old woman with PMHx of PAF status post ablation. No recurrent AF since the ablation. Had a LINQ, no AF, this was removed.     Palpitations are better on metoprolol but she has had some lower BP and an episode of syncope. No recurrent hypotensive episodes.     - Continue current medications  - Continue metoprolol succinate 12.5 QD.  -Consider ablation for recurrent AF.   - Follow up in 6 months.         Increased BMI: Provide patient with information regarding diet and lifestyle changes.    Increased BMI: Provide patient with information regarding diet and lifestyle changes.  Medications Ordered  1.flecainide 50 mg tablet, Take 1 tablet orally 2 times a day.  2.metoprolol succinate ER 25 mg tablet,extended release 24 hr, Take one-half tablet orally once a day.  Labs and Diagnostics ordered  1.EKG (electrocardiogram) (Today)  Future appointments  1.Referral Visit - Lu Rock ([email or web address]) : (Today)  2.Follow up visit - Natalie Pringle MD (1 Year)  Miscellaneous  1.Weight monitoring (regime/therapy)  Nurses documentation  Refills: none  Upcoming surgeries: none   Use of assisted devices: none  EKG: Done  (AR, LONA)     Patient instructions  Follow up appointment unscheduled:   Your provider has requested for you to follow up in 1 year or sooner  if needed.  We will call you to schedule the appointment.  It is always important to bring all your medications including over the counter medications, vitamins and supplements to your doctor visits. This will assist in providing the best care for your condition. Please bring your insurance cards to your appointment.     Call [facility] if you have any problems or concerns at [phone]   CPOE Orders carried out by: Natalie Pringle MD, Adela Valencia and Joelle ORDOÑEZ  Care Providers: Natalie Pringle MD, Kriss Arora SLICK, Adela Valencia and Joelle ORDOÑEZ  Electronically Authenticated by  Natalie rPingle MD  03/13/2024 08:44:32 AM  Disclaimer: Components of this note were documented using voice recognition system and are subject to errors not corrected at proofreading. Contact the author of this note for any clarifications.

## 2024-12-16 NOTE — ED INITIAL ASSESSMENT (HPI)
Pt reports from unit within hospital with hypotension and chest pain. Chest pain started last night, sternal pain that radiates to the back. Pt reports dizziness. Staff accompanies her and says BP upstairs was 78/40.

## 2025-01-27 ENCOUNTER — HOSPITAL ENCOUNTER (OUTPATIENT)
Age: 55
Discharge: HOME | End: 2025-01-27
Payer: COMMERCIAL

## 2025-01-27 ENCOUNTER — NURSE TRIAGE (OUTPATIENT)
Dept: INTERNAL MEDICINE CLINIC | Facility: CLINIC | Age: 55
End: 2025-01-27

## 2025-01-27 ENCOUNTER — HOSPITAL ENCOUNTER (OUTPATIENT)
Age: 55
Discharge: HOME | End: 2025-01-27
Attending: EMERGENCY MEDICINE
Payer: COMMERCIAL

## 2025-01-27 ENCOUNTER — TELEPHONE (OUTPATIENT)
Dept: INTERNAL MEDICINE CLINIC | Facility: CLINIC | Age: 55
End: 2025-01-27

## 2025-01-27 ENCOUNTER — APPOINTMENT (OUTPATIENT)
Dept: ULTRASOUND IMAGING | Age: 55
End: 2025-01-27
Attending: EMERGENCY MEDICINE
Payer: COMMERCIAL

## 2025-01-27 VITALS
SYSTOLIC BLOOD PRESSURE: 122 MMHG | BODY MASS INDEX: 38.98 KG/M2 | DIASTOLIC BLOOD PRESSURE: 88 MMHG | RESPIRATION RATE: 18 BRPM | HEIGHT: 63 IN | OXYGEN SATURATION: 99 % | WEIGHT: 220 LBS | TEMPERATURE: 97 F | HEART RATE: 77 BPM

## 2025-01-27 DIAGNOSIS — S86.912A MUSCLE STRAIN OF LEFT LOWER LEG, INITIAL ENCOUNTER: Primary | ICD-10-CM

## 2025-01-27 PROCEDURE — 93971 EXTREMITY STUDY: CPT | Performed by: EMERGENCY MEDICINE

## 2025-01-27 PROCEDURE — 99214 OFFICE O/P EST MOD 30 MIN: CPT

## 2025-01-27 PROCEDURE — 99213 OFFICE O/P EST LOW 20 MIN: CPT

## 2025-01-27 NOTE — TELEPHONE ENCOUNTER
Action Requested: Summary for Provider     []  Critical Lab, Recommendations Needed  [x] Need Additional Advice  []   FYI    []   Need Orders  [] Need Medications Sent to Pharmacy  []  Other     SUMMARY: please advise on next steps, OV today?     Reason for call: Patient Question (Pain in Left Leg )  Onset: for the past 3 weeks    S/w pt.     Pt stated a couple weeks ago she noticed a sharp pain in her left leg, on side of calf.   Lasted several weeks.   No bump, no redness, no warmth to the touch. Did not radiate.     Then today, pt stated the same pain moved up to the side of thigh (slightly above knee). No more pain in calf. Still no visible bump, redness, or warmth to the touch. No swelling in legs.   Pt states it's like a 7/10 pain. Feels like it's very focused, sharp pain. No radiation.   Pt states it does not feel like muscle cramps.     Please advise on next steps. OV? Thanks!                   Reason for Disposition   Patient wants to be seen    Protocols used: Leg Pain-A-OH

## 2025-01-27 NOTE — ED PROVIDER NOTES
Patient Seen in: [facility]      History     Chief Complaint   Patient presents with    Leg or Foot Injury     Doctor is sending me for an ultrasound to rule out blood clot in my leg - Entered by patient     Stated Complaint: Leg or Foot Injury - Doctor is sending me for an ultrasound to rule out blood c*    Subjective:   HPI      Patient is a 54-year-old female who states for the past 2 weeks she has had some discomfort to her left medial calf.  Patient also developed some discomfort to her left lateral thigh.  Patient denies chest pain, shortness of breath.  Patient saw her doctor was concerned about possibility of DVT and was sent in for an ultrasound.  Patient denies any recent travel, no history of PE DVT.  Remainder of review of systems negative.    Objective:     Past Medical History:    A-fib (HCC)    Anxiety    Constipation    Heart palpitations    Obesity, unspecified    KARMA (obstructive sleep apnea)    AHI 5 REM AHI 10 Supine AHI 29 non-supine AHI 1 Sao2 Theodore 90%     Pain in joint, shoulder region    right    Palpitations    Presence of other cardiac implants and grafts    Stress    Ventricular tachycardia (HCC)    Wears glasses              Past Surgical History:   Procedure Laterality Date          x2    Colonoscopy                  Social History     Socioeconomic History    Marital status:    Tobacco Use    Smoking status: Never    Smokeless tobacco: Never   Vaping Use    Vaping status: Never Used   Substance and Sexual Activity    Alcohol use: Not Currently     Alcohol/week: 0.0 standard drinks of alcohol     Comment: rarely    Drug use: No    Sexual activity: Yes     Partners: Male     Birth control/protection: Condom     Comment: ablation    Other Topics Concern    Caffeine Concern No    Stress Concern Yes     Comment: pretty high daily    Special Diet No    Exercise No     Social Drivers of Health     Physical Activity: Sufficiently Active (5/15/2019)     [facility]    Exercise Vital Sign     Days of Exercise per Week: 4 days     Minutes of Exercise per Session: 60 min              Review of Systems    Positive for stated complaint: Leg or Foot Injury - Doctor is sending me for an ultrasound to rule out blood c*  Other systems are as noted in HPI.  Constitutional and vital signs reviewed.      All other systems reviewed and negative except as noted above.    Physical Exam     ED Triage Vitals [01/27/25 1537]   /88   Pulse 77   Resp 18   Temp 97.4 °F (36.3 °C)   Temp src Oral   SpO2 99 %   O2 Device None (Room air)       Current Vitals:   Vital Signs  BP: 122/88  Pulse: 77  Resp: 18  Temp: 97.4 °F (36.3 °C)  Temp src: Oral    Oxygen Therapy  SpO2: 99 %  O2 Device: None (Room air)        Physical Exam   GENERAL: Patient resting on the cart in no acute distress.  HEENT: Extraocular muscles intact  LUNGS: Lungs clear to auscultation bilaterally.  CARDIOVASCULAR: + S1-S2, regular rate and rhythm, no murmurs.    EXTREMITIES: Left lower extremity, hip and range of motion nontender, knee good range of motion nontender.  Normal dorsiflexion plantarflexion.  Sensation intact to light touch.  Area of tenderness left leg medial mid calf.  No palpable cords.  Also area of tenderness left lateral upper mid thigh.  No erythema warmth or swelling.  SKIN: No rash, good turgor.  NEURO: Patient answers questions appropriately.  No focal deficits appreciated.        ED Course   Labs Reviewed - No data to display         Left leg ultrasound1. No sonographic evidence for deep venous thrombosis involving the left lower extremity.   2. Continued clinical correlation recommended.          MDM      Patient declines pain medicines.  Ultrasound negative for DVT.  Patient but comfortable going home.  Recommend follow-up evaluation with primary physician.  Return if new or worse symptoms.  Tylenol ibuprofen for pain.  Activity as tolerated.   Did consider DVT, muscle strain.        Medical Decision Making      Disposition and Plan     Clinical Impression:  1. Muscle strain of left lower leg, initial encounter         Disposition:  Discharge  1/27/2025  5:21 pm    Follow-up:  Lu Rock MD  [address] [address]  [phone]    In 1 week            Medications Prescribed:  Current Discharge Medication List              Supplementary Documentation:

## 2025-01-27 NOTE — TELEPHONE ENCOUNTER
Patient called stating she is experiencing pain in left leg . Patient states that the pain is moving all around her leg . Patient is concern and would like to know should she book a appointment in regards to the pain in leg . Patient would like a call back regarding this matter .      Phone [phone]

## 2025-01-27 NOTE — DISCHARGE INSTRUCTIONS
Follow-up for further evaluation primary physician.  Return if new or worse symptoms.  Tylenol or ibuprofen for pain but activity as tolerated.

## 2025-02-12 ENCOUNTER — OFFICE VISIT (OUTPATIENT)
Dept: PODIATRY CLINIC | Facility: CLINIC | Age: 55
End: 2025-02-12
Payer: COMMERCIAL

## 2025-02-12 ENCOUNTER — OFFICE VISIT (OUTPATIENT)
Dept: SURGERY | Facility: CLINIC | Age: 55
End: 2025-02-12
Payer: COMMERCIAL

## 2025-02-12 VITALS
OXYGEN SATURATION: 99 % | HEIGHT: 63 IN | WEIGHT: 227 LBS | HEART RATE: 64 BPM | DIASTOLIC BLOOD PRESSURE: 78 MMHG | SYSTOLIC BLOOD PRESSURE: 110 MMHG | BODY MASS INDEX: 40.22 KG/M2

## 2025-02-12 DIAGNOSIS — M25.571 SINUS TARSI SYNDROME, RIGHT: ICD-10-CM

## 2025-02-12 DIAGNOSIS — E78.5 DYSLIPIDEMIA: Primary | ICD-10-CM

## 2025-02-12 DIAGNOSIS — F43.9 STRESS: ICD-10-CM

## 2025-02-12 DIAGNOSIS — G47.33 OSA (OBSTRUCTIVE SLEEP APNEA): ICD-10-CM

## 2025-02-12 DIAGNOSIS — L60.0 ONYCHOCRYPTOSIS: Primary | ICD-10-CM

## 2025-02-12 DIAGNOSIS — R12 HEART BURN: ICD-10-CM

## 2025-02-12 DIAGNOSIS — L60.0 INGROWING LEFT GREAT TOENAIL: ICD-10-CM

## 2025-02-12 DIAGNOSIS — E66.01 MORBID OBESITY WITH BMI OF 40.0-44.9, ADULT (HCC): ICD-10-CM

## 2025-02-12 DIAGNOSIS — M17.0 PRIMARY OSTEOARTHRITIS OF BOTH KNEES: ICD-10-CM

## 2025-02-12 PROCEDURE — 99213 OFFICE O/P EST LOW 20 MIN: CPT | Performed by: PODIATRIST

## 2025-02-12 PROCEDURE — 99215 OFFICE O/P EST HI 40 MIN: CPT | Performed by: INTERNAL MEDICINE

## 2025-02-12 RX ORDER — TIRZEPATIDE 2.5 MG/.5ML
2.5 INJECTION, SOLUTION SUBCUTANEOUS WEEKLY
Qty: 3 ML | Refills: 2 | Status: SHIPPED | OUTPATIENT
Start: 2025-02-12

## 2025-02-12 RX ORDER — ESCITALOPRAM OXALATE 5 MG/1
5 TABLET ORAL DAILY
Qty: 90 TABLET | Refills: 1 | Status: SHIPPED | OUTPATIENT
Start: 2025-02-12 | End: 2025-05-13

## 2025-02-12 NOTE — PROGRESS NOTES
HPI:   Angelique Mccloud is a 54 year old female who presents for a complete physical exam. Symptoms: denies discharge, itching, burning or dysuria. Patient complains of none.   Regular SBE- yes,Sexually active- no,  Contraception- menopausal. STD history- none    Screening:  Immunization History   Administered Date(s) Administered    Covid-19 Vaccine Pfizer 30 mcg/0.3 ml 08/18/2021, 09/09/2021    FLULAVAL 6 months & older 0.5 ml Prefilled syringe ([id number]) 10/27/2022    Flulaval, 3 Years & >, IM 11/01/2021    Influenza 10/05/2016, 10/20/2017, 10/13/2020, 12/31/2024    TDAP 01/31/2011     Tdap - Due, but Pt refuses  Menarche- 12 yr  PAP- 6/5/17  Any abnormal pap smears- years ago  Pregnancies- 2, Live births- 2  Mammogram-  10/9/23   Any breast cancer- none, or any gynecological cancer- none, any cancers father colon cancer, Mother skin cancer, and paternal grandmother jaw cancer   Labs- 9/15/23  DEXA over 65yr- n/a  Flu shot-  at her job  Colon- 3/22/21  Glasses/contacts- yes, last exam- 12/2024  Dental visits- 2023    Immunization History   Administered Date(s) Administered    Covid-19 Vaccine Pfizer 30 mcg/0.3 ml 08/18/2021, 09/09/2021    FLULAVAL 6 months & older 0.5 ml Prefilled syringe ([id number]) 10/27/2022    Flulaval, 3 Years & >, IM 11/01/2021    Influenza 10/05/2016, 10/20/2017, 10/13/2020, 12/31/2024    TDAP 01/31/2011     Wt Readings from Last 6 Encounters:   02/13/25 228 lb 3.2 oz (103.5 kg)   02/12/25 227 lb (103 kg)   01/27/25 220 lb (99.8 kg)   12/16/24 227 lb (103 kg)   04/07/24 210 lb (95.3 kg)   03/05/24 222 lb 9.6 oz (101 kg)     Body mass index is 38.96 kg/m².     Lab Results   Component Value Date    GLU 99 12/16/2024     (H) 09/15/2023    GLU 90 12/13/2022     Lab Results   Component Value Date    CHOLEST 201 (H) 12/03/2021    CHOLEST 205 (H) 11/08/2020    CHOLEST 151 08/14/2019     Lab Results   Component Value Date     (H) 12/03/2021     (H) 11/08/2020    HDL 99 (H)  2019     Lab Results   Component Value Date    LDL 75 2021    LDL 79 2020    LDL 46 2019     Lab Results   Component Value Date    AST 22 2024    AST 12 (L) 09/15/2023    AST 18 2022     Lab Results   Component Value Date    ALT 17 2024    ALT 21 09/15/2023    ALT 27 2022       Current Outpatient Medications   Medication Sig Dispense Refill    metoprolol succinate ER 25 MG Oral Tablet 24 Hr Take 0.5 tablets (12.5 mg total) by mouth daily.      Zinc 50 MG Oral Cap Take by mouth.      Omeprazole 20 MG Oral Tab EC Take by mouth.      Cholecalciferol (VITAMIN D) 125 MCG (5000 UT) Oral Cap Take 1 capsule (5,000 Units total) by mouth daily.      Bacillus Coagulans-Inulin (PROBIOTIC) 1-250 BILLION-MG Oral Cap Take 1 capsule by mouth daily.      Flecainide Acetate 50 MG Oral Tab Take 1 tablet (50 mg total) by mouth 2 (two) times daily.      aspirin 81 MG Oral Chew Tab Chew 1 tablet (81 mg total) by mouth daily.      Magnesium Oxide 400 (240 MG) MG Oral Tab Take 1 tablet (400 mg total) by mouth daily.  3    Tirzepatide-Weight Management (ZEPBOUND) 2.5 MG/0.5ML Subcutaneous Solution Auto-injector Inject 2.5 mg into the skin once a week. (Patient not taking: Reported on 2025) 3 mL 2    escitalopram 5 MG Oral Tab Take 1 tablet (5 mg total) by mouth daily. (Patient not taking: Reported on 2025) 90 tablet 1      Past Medical History:    A-fib (HCC)    Anxiety    Constipation    Heart palpitations    Morbid obesity with BMI of 40.0-44.9, adult (HCC)    Obesity, unspecified    KARMA (obstructive sleep apnea)    AHI 5 REM AHI 10 Supine AHI 29 non-supine AHI 1 Sao2 Theodore 90%     Pain in joint, shoulder region    right    Palpitations    Presence of other cardiac implants and grafts    Stress    Ventricular tachycardia (HCC)    Wears glasses      Past Surgical History:   Procedure Laterality Date          x2    Colonoscopy        Family History   Problem Relation Age of  Onset    Cancer Father         colon?    Diabetes Father     Crohn's Disease Father     Hypertension Father     Ulcerative Colitis Mother     Ulcerative Colitis Son     Cancer Paternal Grandmother         mandibular cancer    Diabetes Sister     Heart Attack Sister       Social History:   Social History     Socioeconomic History    Marital status:    Tobacco Use    Smoking status: Never    Smokeless tobacco: Never   Vaping Use    Vaping status: Never Used   Substance and Sexual Activity    Alcohol use: Not Currently     Alcohol/week: 0.0 standard drinks of alcohol     Comment: rarely    Drug use: No    Sexual activity: Yes     Partners: Male     Birth control/protection: Condom     Comment: ablation    Other Topics Concern    Caffeine Concern No    Stress Concern Yes     Comment: pretty high daily    Special Diet No    Exercise No     Social Drivers of Health     Food Insecurity: Unknown (2/13/2025)    NCSS - Food Insecurity     Worried About Running Out of Food in the Last Year: No   Housing Stability: Not At Risk (2/13/2025)    NCSS - Housing/Utilities     Has Housing: Yes     Worried About Losing Housing: No     Unable to Get Utilities: No     Occ: PCT. : yes. Children: 2.   Exercise: none.  Diet: doesn't watch     REVIEW OF SYSTEMS:   GENERAL: feels well otherwise  SKIN: denies any unusual skin lesions  EYES:denies blurred vision or double vision  HEENT: denies nasal congestion, sinus pain or ST  LUNGS: denies shortness of breath with exertion  CARDIOVASCULAR: denies chest pain on exertion,+Afib  GI: denies abdominal pain,+GERD  : denies dysuria, vaginal discharge or itching  MUSCULOSKELETAL: denies back pain,+OA  NEURO: denies headaches  PSYCHE: + anxiety  HEMATOLOGIC: denies hx of anemia  ENDOCRINE: denies thyroid history  ALL/ASTHMA: denies hx of allergy or asthma    EXAM:   /72 (BP Location: Left arm, Patient Position: Sitting, Cuff Size: adult)   Pulse 66   Temp 97.3 °F (36.3 °C)  (Temporal)   Resp 16   Ht 5' 4.17\" (1.63 m)   Wt 228 lb 3.2 oz (103.5 kg)   LMP 03/19/2013   SpO2 98%   BMI 38.96 kg/m²   Body mass index is 38.96 kg/m².   GENERAL: well developed, well nourished,in no apparent distress  SKIN: no rashes,no suspicious lesions  HEENT: atraumatic, normocephalic,ears and throat are clear  EYES:PERRLA, EOMI, normal optic disk,conjunctiva are clear  NECK: supple,no adenopathy,no bruits  CHEST: no chest tenderness  BREAST: no dominant or suspicious mass  LUNGS: clear to auscultation  CARDIO: RRR without murmur  GI: good BS's,no masses, HSM or tenderness  :introitus is normal,scant discharge,cervix is pink,no adnexal masses or tenderness, PAP was done   RECTAL:good rectal tone  MUSCULOSKELETAL: back is not tender,FROM of the back  EXTREMITIES: no cyanosis, clubbing or edema  NEURO: Oriented times three,cranial nerves are intact,motor and sensory are grossly intact    ASSESSMENT AND PLAN:   Angelique Mccloud is a 54 year old female who presents for a complete physical exam.  Pap and pelvic done. Order put in for mammogram. Health maintenance, will check fasting Labs. Pt' s weight is Body mass index is 38.96 kg/m²., recommended low fat diet and aerobic exercise 30 minutes three times weekly.  The patient indicates understanding of these issues and agrees to the plan.  The patient is asked to return for CPX in one year.  Encounter Diagnoses   Name Primary?    Encounter for screening mammogram for malignant neoplasm of breast     Encounter for gynecological examination with Papanicolaou smear of cervix     Routine general medical examination at a health care facility Yes    Vitamin D deficiency        Orders Placed This Encounter   Procedures    Lipid Panel    Hemoglobin A1C    TSH W Reflex To Free T4    Vitamin D    ThinPrep PAP with HPV Reflex Request B       Meds & Refills for this Visit:  Requested Prescriptions      No prescriptions requested or ordered in this encounter        Imaging & Consults:  LORENZA AFIA 2D+3D SCREENING BILAT (CPT=77067/06488)

## 2025-02-12 NOTE — PROGRESS NOTES
Angelique Mccloud is a 54 year old female.   Chief Complaint   Patient presents with    Foot Pain     Right foot - Pt states pain since January. Pain on top of foot, close to ankle.  Throbbing pain on and off. Numbness and tingling at times. No injury     Ingrown Toenail     Left hallux - B/l border - pain on and off.          HPI:   Returns to the clinic at this time complaining of right foot pain on the side of her foot to sharp jabbing pain which sometimes is localized to 1 area does not travel to radiated she has no history of injury or trauma she points to the outside of her right foot.  She also complains of recurrent ingrown toenails medial lateral nail borders of the left great toe.  At today's visit reviewed nurse's history as taken above, allergies medications and medical history as documented below.  All changes duly noted  Allergies: Patient has no known allergies.   Current Outpatient Medications   Medication Sig Dispense Refill    Tirzepatide-Weight Management (ZEPBOUND) 2.5 MG/0.5ML Subcutaneous Solution Auto-injector Inject 2.5 mg into the skin once a week. 3 mL 2    escitalopram 5 MG Oral Tab Take 1 tablet (5 mg total) by mouth daily. 90 tablet 1    metoprolol succinate ER 25 MG Oral Tablet 24 Hr Take 0.5 tablets (12.5 mg total) by mouth daily.      Zinc 50 MG Oral Cap Take by mouth.      Omeprazole 20 MG Oral Tab EC Take by mouth.      metoprolol tartrate 25 MG Oral Tab Take 0.5 tablets (12.5 mg total) by mouth 2 (two) times daily.      Cholecalciferol (VITAMIN D) 125 MCG (5000 UT) Oral Cap Take 1 capsule (5,000 Units total) by mouth daily.      Bacillus Coagulans-Inulin (PROBIOTIC) 1-250 BILLION-MG Oral Cap Take 1 capsule by mouth daily.      Flecainide Acetate 50 MG Oral Tab Take 1 tablet (50 mg total) by mouth 2 (two) times daily.      aspirin 81 MG Oral Chew Tab Chew 1 tablet (81 mg total) by mouth daily.      Magnesium Oxide 400 (240 MG) MG Oral Tab Take 1 tablet (400 mg total) by mouth daily.   3      Past Medical History:    A-fib (HCC)    Anxiety    Constipation    Heart palpitations    Morbid obesity with BMI of 40.0-44.9, adult (HCC)    Obesity, unspecified    KARMA (obstructive sleep apnea)    AHI 5 REM AHI 10 Supine AHI 29 non-supine AHI 1 Sao2 Theodore 90%     Pain in joint, shoulder region    right    Palpitations    Presence of other cardiac implants and grafts    Stress    Ventricular tachycardia (HCC)    Wears glasses      Past Surgical History:   Procedure Laterality Date          x2    Colonoscopy        Family History   Problem Relation Age of Onset    Cancer Father         colon?    Diabetes Father     Crohn's Disease Father     Hypertension Father     Ulcerative Colitis Mother     Ulcerative Colitis Son     Cancer Paternal Grandmother         mandibular cancer    Diabetes Sister     Heart Attack Sister       Social History     Socioeconomic History    Marital status:    Tobacco Use    Smoking status: Never    Smokeless tobacco: Never   Vaping Use    Vaping status: Never Used   Substance and Sexual Activity    Alcohol use: Not Currently     Alcohol/week: 0.0 standard drinks of alcohol     Comment: rarely    Drug use: No    Sexual activity: Yes     Partners: Male     Birth control/protection: Condom     Comment: ablation    Other Topics Concern    Caffeine Concern No    Stress Concern Yes     Comment: pretty high daily    Special Diet No    Exercise No           REVIEW OF SYSTEMS:   Today reviewed systens as documented below  GENERAL HEALTH: feels well otherwise  SKIN: Refer to exam below  RESPIRATORY: denies shortness of breath with exertion  CARDIOVASCULAR: denies chest pain on exertion  GI: denies abdominal pain and denies heartburn  NEURO: denies headaches    EXAM:   LMP 2013   GENERAL: well developed, well nourished, in no apparent distress  EXTREMITIES:   1. Integument: Skin on her feet is warm and dry.  The medial lateral nail borders of the left hallux are  incurvated with hyperkeratotic impactions   2. Vascular: Patient has palpable pulses bilateral that are symmetrical and equivocal   3. Neurologic: Patient has intact sense   4. Musculoskeletal: Patient has pain on palpation of the lateral aspect of her foot over the sinus tarsi I percussed the area there is no nerve pain here nor neurological symptoms.  X-rays were taken 3 views show maybe some mild osteoarthritic changes to the dorsal calcaneocuboid joint.  No fractures no dislocations    ASSESSMENT AND PLAN:   Diagnoses and all orders for this visit:    Onychocryptosis    Ingrowing left great toenail    Sinus tarsi syndrome, right    Other orders  -     [facility] AMB POD XR - RT FOOT 3 VIEWS(AP,LAT,OBLIQUE)WT BEARING        Plan: Today using a slant back technique trimmed and debrided the medial lateral nail borders of the hallux uneventfully alleviating all symptoms,Today discussed the nature and extent of a cortisone injection as well as the possible complications and risks.  Patient was in agreement to receive the injection.  The patient was consented for a cortisone injection and after timeout was taken the injection consisting of 20 mg Kenalog and 0.5 cc of 0.5% Marcaine plain was injected into the symptomatic sinus tarsi of the right foot using aseptic technique and appropriate approach.  A Band-Aid was applied to the injection site.  Follow-up in 2 to 3 weeks to see if the injection helped and also again encouraged patient to find time to correct her nail edges.  The patient indicates understanding of these issues and agrees to the plan.    Robert Crenshaw DPM

## 2025-02-12 NOTE — PROGRESS NOTES
Per Dr. Crenshaw draw up 0.5ml of 0.5% Marcaine and 0.5ml of Kenalog 40 for injection to right foot.

## 2025-02-12 NOTE — PROGRESS NOTES
[facility] Consultation Note       Patient:  Angelique Mccloud  :      1970  MRN:      [id number]    Referring Provider: Dr. Rock       Chief Complaint:    Chief Complaint   Patient presents with    Consult    Weight Management       SUBJECTIVE     History of Present Illness:  Angelique Mccloud has been referred to me for evaluation and treatment.       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 Weight has been a struggle for the past several years and is now starting to develop into co-morbidities that are worrisome to the patient. Patient is interested in losing weight, so it can stay off long term.    Patient also understands that this is a life style change and wants to get on track.    Interested in non surgical weight loss    Past Medical History:   Past Medical History:    A-fib ([facility])    Anxiety    Constipation    Heart palpitations    Morbid obesity with BMI of 40.0-44.9, adult ([facility])    Obesity, unspecified    KARMA (obstructive sleep apnea)    AHI 5 REM AHI 10 Supine AHI 29 non-supine AHI 1 Sao2 Theodore 90%     Pain in joint, shoulder region    right    Palpitations    Presence of other cardiac implants and grafts    Stress    Ventricular tachycardia ([facility])    Wears glasses       OBJECTIVE     Vitals: /78   Pulse 64   Ht 5' 3\" (1.6 m)   Wt 227 lb (103 kg)   LMP 2013   SpO2 99%   BMI 40.21 kg/m²      Patient Medications:    Current Outpatient Medications   Medication Sig Dispense Refill    metoprolol succinate ER 25 MG Oral Tablet 24 Hr Take 0.5 tablets (12.5 mg total) by mouth daily.      Zinc 50 MG Oral Cap Take by mouth.      Omeprazole 20 MG Oral Tab EC Take by mouth.      metoprolol tartrate 25 MG Oral Tab Take 0.5 tablets (12.5 mg total) by mouth 2 (two) times daily.      Cholecalciferol (VITAMIN D) 125 MCG  (5000 UT) Oral Cap Take 1 capsule (5,000 Units total) by mouth daily.      Bacillus Coagulans-Inulin (PROBIOTIC) 1-250 BILLION-MG Oral Cap Take 1 capsule by mouth daily.      Flecainide Acetate 50 MG Oral Tab Take 1 tablet (50 mg total) by mouth 2 (two) times daily.      aspirin 81 MG Oral Chew Tab Chew 1 tablet (81 mg total) by mouth daily.      Magnesium Oxide 400 (240 MG) MG Oral Tab Take 1 tablet (400 mg total) by mouth daily.  3       Allergies:  Patient has no known allergies.     Comorbidities:  KARMA    Social History:    Social History     Socioeconomic History    Marital status:      Spouse name: Not on file    Number of children: Not on file    Years of education: Not on file    Highest education level: Not on file   Occupational History    Not on file   Tobacco Use    Smoking status: Never    Smokeless tobacco: Never   Vaping Use    Vaping status: Never Used   Substance and Sexual Activity    Alcohol use: Not Currently     Alcohol/week: 0.0 standard drinks of alcohol     Comment: rarely    Drug use: No    Sexual activity: Yes     Partners: Male     Birth control/protection: Condom     Comment: ablation    Other Topics Concern     Service Not Asked    Blood Transfusions Not Asked    Caffeine Concern No    Occupational Exposure Not Asked    Hobby Hazards Not Asked    Sleep Concern Not Asked    Stress Concern Yes     Comment: pretty high daily    Weight Concern Not Asked    Special Diet No    Back Care Not Asked    Exercise No    Bike Helmet Not Asked    Seat Belt Not Asked    Self-Exams Not Asked   Social History Narrative    Not on file     Social Drivers of Health     Food Insecurity: Not on file   Transportation Needs: Not on file   Stress: Not on file   Housing Stability: Not on file     Surgical History:    Past Surgical History:   Procedure Laterality Date          x2    Colonoscopy         Family History:    Family History   Problem Relation Age of Onset    Cancer Father          colon?    Diabetes Father     Crohn's Disease Father     Hypertension Father     Ulcerative Colitis Mother     Ulcerative Colitis Son     Cancer Paternal Grandmother         mandibular cancer    Diabetes Sister     Heart Attack Sister            Typical Dietary Intake:  Breakfast AM Snack Lunch PM Snack Dinner   Eggs, sausage, water Banana  Left overs, sandwich, rice,  Ice cream, cookies Chicken, rice, tacos, salads   Sweet tooth  Eats quickly      Soda Drinker?: No  If yes, how much?:      Number of restaurant or fast food meals/week:  4 meals/week    Nutritional Goals Reviewed and Discussed:     Limit carbohydrates to 100 gms per day, Eat 100-200 calories within 1 hour of waking up, and Eat 3-4 cups of fresh fruit or vegetables daily    Behavior Modifications Reviewed and Discussed:    Eat breakfast, Eat 3 meals per day, Plan meals in advance, Read nutrition labels, Drink 64oz of water per day, Maintain a daily food journal, No drinking 30 minutes before or after meals, Utilize portion control strategies to reduce calorie intake, Identify triggers for eating and manage cues, and Eat slowly and take 20 to 30 minutes to complete each meal      ROS:  Constitutional: negative  Respiratory: negative  Cardiovascular: negative  Gastrointestinal: positive for reflux symptoms  Musculoskeletal:positive for arthralgias  Neurological: negative  Behavioral/Psych: positive for stress  Endocrine: negative  All other systems were reviewed and are negative.    Physical Exam:  General appearance: alert, appears stated age, cooperative, and moderately obese  Head: Normocephalic, without obvious abnormality, atraumatic  Back: symmetric, no curvature. ROM normal. No CVA tenderness.  Lungs: clear to auscultation bilaterally  Abdomen:  soft, obese, non tender  Extremities: extremities normal, atraumatic, no cyanosis or edema  Pulses: 2+ and symmetric  Skin: Skin color, texture, turgor normal. No rashes or lesions  Neurologic: Grossly  normal    ASSESSMENT         Encounter Diagnosis(ses):   1. KARMA (obstructive sleep apnea)    2. Dyslipidemia    3. Heart burn    4. Primary osteoarthritis of both knees    5. Morbid obesity with BMI of 40.0-44.9, adult ([facility])        PLAN     Patient is not interested in bariatric surgery. Patient desires to pursue traditional weight loss at this time.      DYSLIPIDEMIA: Stable on the above prescribed meal plan and medication. Liver function stable.    Lab Results   Component Value Date/Time    CHOLEST 201 (H) 12/03/2021 06:49 AM    LDL 75 12/03/2021 06:49 AM     (H) 12/03/2021 06:49 AM    TRIG 40 12/03/2021 06:49 AM    VLDL 6 12/03/2021 06:49 AM    TCHDLRATIO 1.74 02/23/2015 09:30 AM       KARMA: Zepbound and weight loss may help    GERD: The patient believes her reflux is somewhat better of at least no worse on current medication. She was encouraged to avoid caffeine products, elevated head of bed and not eat for 1 hour before bedtime. No interval changes were made in her anti-reflux medications.     DEGENERATIVE JOINT DISEASE: Of the knees is stable. Encourage upper body exercises if unable to perform weight-bearing exercises.      Goals for next month:  1. Keep a food log.  2. Drink 48-64 ounces of non-caloric beverages per day. No fruit juices or regular soda.  3. Increase activity-upper body exercises, walk 10 minutes per day.  4. Increase fruit and vegetable servings to 5-6 per day.      May benefit from Zepbound for her KARMA    Increased stress: may benefit from an SSRI    Ambulate as tolerated    Diagnoses and all orders for this visit:    KARMA (obstructive sleep apnea)    Dyslipidemia    Heart burn    Primary osteoarthritis of both knees    Morbid obesity with BMI of 40.0-44.9, adult ([facility])        Chidi Dwyer MD

## 2025-02-13 ENCOUNTER — OFFICE VISIT (OUTPATIENT)
Dept: INTERNAL MEDICINE CLINIC | Facility: CLINIC | Age: 55
End: 2025-02-13
Payer: COMMERCIAL

## 2025-02-13 ENCOUNTER — LAB ENCOUNTER (OUTPATIENT)
Dept: LAB | Age: 55
End: 2025-02-13
Attending: FAMILY MEDICINE
Payer: COMMERCIAL

## 2025-02-13 VITALS
HEART RATE: 66 BPM | HEIGHT: 64.17 IN | WEIGHT: 228.19 LBS | OXYGEN SATURATION: 98 % | SYSTOLIC BLOOD PRESSURE: 118 MMHG | RESPIRATION RATE: 16 BRPM | TEMPERATURE: 97 F | BODY MASS INDEX: 38.96 KG/M2 | DIASTOLIC BLOOD PRESSURE: 72 MMHG

## 2025-02-13 DIAGNOSIS — E55.9 VITAMIN D DEFICIENCY: ICD-10-CM

## 2025-02-13 DIAGNOSIS — Z01.419 ENCOUNTER FOR GYNECOLOGICAL EXAMINATION WITH PAPANICOLAOU SMEAR OF CERVIX: ICD-10-CM

## 2025-02-13 DIAGNOSIS — Z00.00 ROUTINE GENERAL MEDICAL EXAMINATION AT A HEALTH CARE FACILITY: ICD-10-CM

## 2025-02-13 DIAGNOSIS — Z00.00 ROUTINE GENERAL MEDICAL EXAMINATION AT A HEALTH CARE FACILITY: Primary | ICD-10-CM

## 2025-02-13 DIAGNOSIS — Z12.31 ENCOUNTER FOR SCREENING MAMMOGRAM FOR MALIGNANT NEOPLASM OF BREAST: ICD-10-CM

## 2025-02-13 LAB
CHOLEST SERPL-MCNC: 187 MG/DL (ref ?–200)
EST. AVERAGE GLUCOSE BLD GHB EST-MCNC: 111 MG/DL (ref 68–126)
FASTING PATIENT LIPID ANSWER: YES
HBA1C MFR BLD: 5.5 % (ref ?–5.7)
HDLC SERPL-MCNC: 78 MG/DL (ref 40–59)
LDLC SERPL CALC-MCNC: 99 MG/DL (ref ?–100)
NONHDLC SERPL-MCNC: 109 MG/DL (ref ?–130)
TRIGL SERPL-MCNC: 50 MG/DL (ref 30–149)
TSI SER-ACNC: 0.71 UIU/ML (ref 0.55–4.78)
VIT D+METAB SERPL-MCNC: 49 NG/ML (ref 30–100)
VLDLC SERPL CALC-MCNC: 8 MG/DL (ref 0–30)

## 2025-02-13 PROCEDURE — 87624 HPV HI-RISK TYP POOLED RSLT: CPT | Performed by: FAMILY MEDICINE

## 2025-02-13 PROCEDURE — 82306 VITAMIN D 25 HYDROXY: CPT

## 2025-02-13 PROCEDURE — 99396 PREV VISIT EST AGE 40-64: CPT | Performed by: FAMILY MEDICINE

## 2025-02-13 PROCEDURE — 84443 ASSAY THYROID STIM HORMONE: CPT

## 2025-02-13 PROCEDURE — 36415 COLL VENOUS BLD VENIPUNCTURE: CPT

## 2025-02-13 PROCEDURE — 83036 HEMOGLOBIN GLYCOSYLATED A1C: CPT

## 2025-02-13 PROCEDURE — 80061 LIPID PANEL: CPT

## 2025-02-17 LAB — HPV E6+E7 MRNA CVX QL NAA+PROBE: NEGATIVE

## 2025-03-05 ENCOUNTER — OFFICE VISIT (OUTPATIENT)
Dept: PODIATRY CLINIC | Facility: CLINIC | Age: 55
End: 2025-03-05
Payer: COMMERCIAL

## 2025-03-05 VITALS — WEIGHT: 228 LBS | HEIGHT: 64.17 IN | BODY MASS INDEX: 38.93 KG/M2

## 2025-03-05 DIAGNOSIS — M25.571 ACUTE RIGHT ANKLE PAIN: Primary | ICD-10-CM

## 2025-03-05 DIAGNOSIS — M25.572 ACUTE LEFT ANKLE PAIN: ICD-10-CM

## 2025-03-05 PROCEDURE — 99213 OFFICE O/P EST LOW 20 MIN: CPT | Performed by: PODIATRIST

## 2025-03-05 RX ORDER — METHYLPREDNISOLONE 4 MG/1
TABLET ORAL
Qty: 21 TABLET | Refills: 0 | Status: SHIPPED | OUTPATIENT
Start: 2025-03-05

## 2025-03-05 NOTE — PROGRESS NOTES
Angelique Mccloud is a 55 year old female.   Chief Complaint   Patient presents with    Foot Pain     Right foot pain f/u - pt states that the cortisone injection that was given on 02/12 did not help at all - pain rated 5-9/10 on and off more of a throbbing pain         HPI:   Patient returns to clinic the injection on the right did not help anymore it is a little bit higher now.  She started to get a shooting pain in the left.  At today's visit reviewed nurse's history as taken above, allergies medications and medical history as documented below.  All changes duly noted  Allergies: Patient has no known allergies.   Current Outpatient Medications   Medication Sig Dispense Refill    methylPREDNISolone 4 MG Oral Tablet Therapy Pack Take per package insert (instructions). 21 tablet 0    Tirzepatide-Weight Management (ZEPBOUND) 2.5 MG/0.5ML Subcutaneous Solution Auto-injector Inject 2.5 mg into the skin once a week. 3 mL 2    metoprolol succinate ER 25 MG Oral Tablet 24 Hr Take 0.5 tablets (12.5 mg total) by mouth daily.      Zinc 50 MG Oral Cap Take by mouth.      Omeprazole 20 MG Oral Tab EC Take by mouth.      Cholecalciferol (VITAMIN D) 125 MCG (5000 UT) Oral Cap Take 1 capsule (5,000 Units total) by mouth daily.      Bacillus Coagulans-Inulin (PROBIOTIC) 1-250 BILLION-MG Oral Cap Take 1 capsule by mouth daily.      Flecainide Acetate 50 MG Oral Tab Take 1 tablet (50 mg total) by mouth 2 (two) times daily.      aspirin 81 MG Oral Chew Tab Chew 1 tablet (81 mg total) by mouth daily.      Magnesium Oxide 400 (240 MG) MG Oral Tab Take 1 tablet (400 mg total) by mouth daily.  3    escitalopram 5 MG Oral Tab Take 1 tablet (5 mg total) by mouth daily. (Patient not taking: Reported on 3/5/2025) 90 tablet 1      Past Medical History:    A-fib (HCC)    Anxiety    Constipation    Heart palpitations    Morbid obesity with BMI of 40.0-44.9, adult (HCC)    Obesity, unspecified    KARMA (obstructive sleep apnea)    AHI 5 REM AHI  10 Supine AHI 29 non-supine AHI 1 Sao2 Theodore 90%     Pain in joint, shoulder region    right    Palpitations    Presence of other cardiac implants and grafts    Stress    Ventricular tachycardia (HCC)    Wears glasses      Past Surgical History:   Procedure Laterality Date          x2    Colonoscopy        Family History   Problem Relation Age of Onset    Cancer Father         colon?    Diabetes Father     Crohn's Disease Father     Hypertension Father     Ulcerative Colitis Mother     Ulcerative Colitis Son     Cancer Paternal Grandmother         mandibular cancer    Diabetes Sister     Heart Attack Sister       Social History     Socioeconomic History    Marital status:    Tobacco Use    Smoking status: Never    Smokeless tobacco: Never   Vaping Use    Vaping status: Never Used   Substance and Sexual Activity    Alcohol use: Not Currently     Alcohol/week: 0.0 standard drinks of alcohol     Comment: rarely    Drug use: No    Sexual activity: Yes     Partners: Male     Birth control/protection: Condom     Comment: ablation    Other Topics Concern    Caffeine Concern No    Stress Concern Yes     Comment: pretty high daily    Special Diet No    Exercise No           REVIEW OF SYSTEMS:   Today reviewed systens as documented below  GENERAL HEALTH: feels well otherwise  SKIN: Refer to exam below  RESPIRATORY: denies shortness of breath with exertion  CARDIOVASCULAR: denies chest pain on exertion  GI: denies abdominal pain and denies heartburn  NEURO: denies headaches    EXAM:   Ht 5' 4.17\" (1.63 m)   Wt 228 lb (103.4 kg)   LMP 2013   BMI 38.93 kg/m²   GENERAL: well developed, well nourished, in no apparent distress  EXTREMITIES:   1. Integument: The skin on the patient's feet was examined its warm and dry.  I did not notice any excessive swelling or erythema   2. Vascular: Patient has palpable pulses   3. Neurologic: Has intact sense   4. Musculoskeletal: Patient seems to have pain now on the  ankle joint of the right foot more at the dorsal medial aspect of the talar dome on palpation.  She has pain on the left which is just at the level of the lateral gutter.  This is intermittent but the right is continuous and throbbing.    ASSESSMENT AND PLAN:   Diagnoses and all orders for this visit:    Acute right ankle pain  -     OP REFERRAL TO [facility]    Acute left ankle pain  -     OP REFERRAL TO [facility]    Other orders  -     methylPREDNISolone 4 MG Oral Tablet Therapy Pack; Take per package insert (instructions).        Plan: She has new symptoms will plan a cortisone injection will try Medrol Dosepak and physical therapy I will see her back in about 3 to 4 weeks for checkup but sooner if required.    The patient indicates understanding of these issues and agrees to the plan.    Robert Crenshaw DPM

## 2025-03-06 ENCOUNTER — TELEPHONE (OUTPATIENT)
Dept: SURGERY | Facility: CLINIC | Age: 55
End: 2025-03-06

## 2025-03-06 NOTE — TELEPHONE ENCOUNTER
Current Outpatient Medications   Medication Sig Dispense Refill           Tirzepatide-Weight Management (ZEPBOUND) 2.5 MG/0.5ML Subcutaneous Solution Auto-injector Inject 2.5 mg into the skin once a week. 3 mL 2     Prior Authorization required  Key: [id number]

## 2025-03-13 ENCOUNTER — APPOINTMENT (OUTPATIENT)
Dept: CT IMAGING | Age: 55
End: 2025-03-13
Attending: EMERGENCY MEDICINE
Payer: COMMERCIAL

## 2025-03-13 ENCOUNTER — APPOINTMENT (OUTPATIENT)
Dept: GENERAL RADIOLOGY | Age: 55
End: 2025-03-13
Attending: PHYSICIAN ASSISTANT
Payer: COMMERCIAL

## 2025-03-13 ENCOUNTER — HOSPITAL ENCOUNTER (EMERGENCY)
Age: 55
Discharge: HOME | End: 2025-03-13
Attending: EMERGENCY MEDICINE
Payer: COMMERCIAL

## 2025-03-13 ENCOUNTER — HOSPITAL ENCOUNTER (OUTPATIENT)
Age: 55
Discharge: ANOTHER HOSPITAL | End: 2025-03-13
Payer: COMMERCIAL

## 2025-03-13 VITALS
TEMPERATURE: 98 F | HEART RATE: 74 BPM | OXYGEN SATURATION: 99 % | RESPIRATION RATE: 20 BRPM | DIASTOLIC BLOOD PRESSURE: 66 MMHG | BODY MASS INDEX: 39 KG/M2 | WEIGHT: 227.94 LBS | SYSTOLIC BLOOD PRESSURE: 94 MMHG

## 2025-03-13 VITALS
TEMPERATURE: 97 F | DIASTOLIC BLOOD PRESSURE: 77 MMHG | SYSTOLIC BLOOD PRESSURE: 125 MMHG | RESPIRATION RATE: 18 BRPM | OXYGEN SATURATION: 98 % | HEART RATE: 79 BPM

## 2025-03-13 DIAGNOSIS — R79.89 ELEVATED D-DIMER: ICD-10-CM

## 2025-03-13 DIAGNOSIS — R07.9 CHEST PAIN OF UNCERTAIN ETIOLOGY: Primary | ICD-10-CM

## 2025-03-13 DIAGNOSIS — R79.89 POSITIVE D DIMER: ICD-10-CM

## 2025-03-13 DIAGNOSIS — R07.89 CHEST PAIN, ATYPICAL: Primary | ICD-10-CM

## 2025-03-13 LAB
#MXD IC: 0.5 X10ˆ3/UL (ref 0.1–1)
ALBUMIN SERPL-MCNC: 4.7 G/DL (ref 3.2–4.8)
ALBUMIN/GLOB SERPL: 1.5 {RATIO} (ref 1–2)
ALP LIVER SERPL-CCNC: 95 U/L
ALT SERPL-CCNC: 14 U/L
ANION GAP SERPL CALC-SCNC: 5 MMOL/L (ref 0–18)
AST SERPL-CCNC: 17 U/L (ref ?–34)
ATRIAL RATE: 76 BPM
BASOPHILS # BLD AUTO: 0.04 X10(3) UL (ref 0–0.2)
BASOPHILS NFR BLD AUTO: 0.5 %
BILIRUB SERPL-MCNC: 0.4 MG/DL (ref 0.3–1.2)
BUN BLD-MCNC: 10 MG/DL (ref 9–23)
BUN BLD-MCNC: 9 MG/DL (ref 7–18)
CALCIUM BLD-MCNC: 9.8 MG/DL (ref 8.7–10.6)
CHLORIDE BLD-SCNC: 107 MMOL/L (ref 98–112)
CHLORIDE SERPL-SCNC: 106 MMOL/L (ref 98–112)
CO2 BLD-SCNC: 24 MMOL/L (ref 21–32)
CO2 SERPL-SCNC: 30 MMOL/L (ref 21–32)
CREAT BLD-MCNC: 0.7 MG/DL
CREAT BLD-MCNC: 0.72 MG/DL
D DIMER PPP FEU-MCNC: 0.69 UG/ML FEU (ref ?–0.55)
DDIMER WHOLE BLOOD: 418 NG/ML DDU (ref ?–400)
EGFRCR SERPLBLD CKD-EPI 2021: 102 ML/MIN/1.73M2 (ref 60–?)
EGFRCR SERPLBLD CKD-EPI 2021: 99 ML/MIN/1.73M2 (ref 60–?)
EOSINOPHIL # BLD AUTO: 0.06 X10(3) UL (ref 0–0.7)
EOSINOPHIL NFR BLD AUTO: 0.8 %
ERYTHROCYTE [DISTWIDTH] IN BLOOD BY AUTOMATED COUNT: 12.8 %
GLOBULIN PLAS-MCNC: 3.1 G/DL (ref 2–3.5)
GLUCOSE BLD-MCNC: 90 MG/DL (ref 70–99)
GLUCOSE BLD-MCNC: 93 MG/DL (ref 70–99)
HCT VFR BLD AUTO: 46.4 %
HCT VFR BLD AUTO: 46.6 %
HCT VFR BLD CALC: 45 %
HGB BLD-MCNC: 15.2 G/DL
HGB BLD-MCNC: 16.1 G/DL
IMM GRANULOCYTES # BLD AUTO: 0.03 X10(3) UL (ref 0–1)
IMM GRANULOCYTES NFR BLD: 0.4 %
INR BLD: 0.94 (ref 0.8–1.2)
ISTAT IONIZED CALCIUM FOR CHEM 8: 1.1 MMOL/L (ref 1.12–1.32)
LYMPHOCYTES # BLD AUTO: 1.6 X10ˆ3/UL (ref 1–4)
LYMPHOCYTES # BLD AUTO: 1.99 X10(3) UL (ref 1–4)
LYMPHOCYTES NFR BLD AUTO: 22.9 %
LYMPHOCYTES NFR BLD AUTO: 25.2 %
MCH RBC QN AUTO: 29.3 PG (ref 26–34)
MCH RBC QN AUTO: 31.1 PG (ref 26–34)
MCHC RBC AUTO-ENTMCNC: 32.8 G/DL (ref 31–37)
MCHC RBC AUTO-ENTMCNC: 34.5 G/DL (ref 31–37)
MCV RBC AUTO: 89.6 FL (ref 80–100)
MCV RBC AUTO: 90 FL
MIXED CELL %: 7.8 %
MONOCYTES # BLD AUTO: 0.52 X10(3) UL (ref 0.1–1)
MONOCYTES NFR BLD AUTO: 6.6 %
NEUTROPHILS # BLD AUTO: 4.7 X10ˆ3/UL (ref 1.5–7.7)
NEUTROPHILS # BLD AUTO: 5.26 X10 (3) UL (ref 1.5–7.7)
NEUTROPHILS # BLD AUTO: 5.26 X10(3) UL (ref 1.5–7.7)
NEUTROPHILS NFR BLD AUTO: 66.5 %
NEUTROPHILS NFR BLD AUTO: 69.3 %
OSMOLALITY SERPL CALC.SUM OF ELEC: 291 MOSM/KG (ref 275–295)
P AXIS: 68 DEGREES
P-R INTERVAL: 144 MS
PLATELET # BLD AUTO: 260 10(3)UL (ref 150–450)
PLATELET # BLD AUTO: 274 X10ˆ3/UL (ref 150–450)
POTASSIUM BLD-SCNC: 4.1 MMOL/L (ref 3.6–5.1)
POTASSIUM SERPL-SCNC: 4 MMOL/L (ref 3.5–5.1)
PROT SERPL-MCNC: 7.8 G/DL (ref 5.7–8.2)
PROTHROMBIN TIME: 12.4 SECONDS (ref 11.6–14.8)
Q-T INTERVAL: 392 MS
QRS DURATION: 88 MS
QTC CALCULATION (BEZET): 441 MS
R AXIS: 2 DEGREES
RBC # BLD AUTO: 5.18 X10(6)UL
RBC # BLD AUTO: 5.18 X10ˆ6/UL
SODIUM BLD-SCNC: 142 MMOL/L (ref 136–145)
SODIUM SERPL-SCNC: 141 MMOL/L (ref 136–145)
T AXIS: 5 DEGREES
TROPONIN I BLD-MCNC: <0.02 NG/ML
TROPONIN I SERPL HS-MCNC: <3 NG/L
VENTRICULAR RATE: 76 BPM
WBC # BLD AUTO: 6.8 X10ˆ3/UL (ref 4–11)
WBC # BLD AUTO: 7.9 X10(3) UL (ref 4–11)

## 2025-03-13 PROCEDURE — 85378 FIBRIN DEGRADE SEMIQUANT: CPT | Performed by: PHYSICIAN ASSISTANT

## 2025-03-13 PROCEDURE — 36415 COLL VENOUS BLD VENIPUNCTURE: CPT

## 2025-03-13 PROCEDURE — 71275 CT ANGIOGRAPHY CHEST: CPT | Performed by: EMERGENCY MEDICINE

## 2025-03-13 PROCEDURE — 93000 ELECTROCARDIOGRAM COMPLETE: CPT | Performed by: PHYSICIAN ASSISTANT

## 2025-03-13 PROCEDURE — 80053 COMPREHEN METABOLIC PANEL: CPT | Performed by: EMERGENCY MEDICINE

## 2025-03-13 PROCEDURE — 85025 COMPLETE CBC W/AUTO DIFF WBC: CPT | Performed by: PHYSICIAN ASSISTANT

## 2025-03-13 PROCEDURE — 85379 FIBRIN DEGRADATION QUANT: CPT | Performed by: EMERGENCY MEDICINE

## 2025-03-13 PROCEDURE — 85610 PROTHROMBIN TIME: CPT | Performed by: EMERGENCY MEDICINE

## 2025-03-13 PROCEDURE — 71046 X-RAY EXAM CHEST 2 VIEWS: CPT | Performed by: PHYSICIAN ASSISTANT

## 2025-03-13 PROCEDURE — 84484 ASSAY OF TROPONIN QUANT: CPT | Performed by: PHYSICIAN ASSISTANT

## 2025-03-13 PROCEDURE — 80047 BASIC METABLC PNL IONIZED CA: CPT | Performed by: PHYSICIAN ASSISTANT

## 2025-03-13 PROCEDURE — 99284 EMERGENCY DEPT VISIT MOD MDM: CPT

## 2025-03-13 PROCEDURE — 99285 EMERGENCY DEPT VISIT HI MDM: CPT

## 2025-03-13 PROCEDURE — 84484 ASSAY OF TROPONIN QUANT: CPT | Performed by: EMERGENCY MEDICINE

## 2025-03-13 PROCEDURE — 99215 OFFICE O/P EST HI 40 MIN: CPT | Performed by: PHYSICIAN ASSISTANT

## 2025-03-13 NOTE — ED PROVIDER NOTES
Patient Seen in: [facility] [place]      History     Chief Complaint   Patient presents with    Abnormal Result     Stated Complaint: elevated D dimer from IC    Subjective:   55-year-old female, history of A-fib, on aspirin, on flecainide and metoprolol, presents with elevated D-dimer.  Went to immediate care for some left-sided reproducible chest pain.  Had a dimer drawn is mildly elevated sent here for CT of the chest.  No pleurisy.  No hemoptysis.  No falls or blunt trauma.  No fever.              Objective:     Past Medical History:    A-fib (HCC)    Anxiety    Constipation    Heart palpitations    Morbid obesity with BMI of 40.0-44.9, adult (HCC)    Obesity, unspecified    KARMA (obstructive sleep apnea)    AHI 5 REM AHI 10 Supine AHI 29 non-supine AHI 1 Sao2 Theodore 90%     Pain in joint, shoulder region    right    Palpitations    Presence of other cardiac implants and grafts    Stress    Ventricular tachycardia (HCC)    Wears glasses              Past Surgical History:   Procedure Laterality Date          x2    Colonoscopy                  Social History     Socioeconomic History    Marital status:    Tobacco Use    Smoking status: Never    Smokeless tobacco: Never   Vaping Use    Vaping status: Never Used   Substance and Sexual Activity    Alcohol use: Not Currently     Alcohol/week: 0.0 standard drinks of alcohol     Comment: rarely    Drug use: No    Sexual activity: Yes     Partners: Male     Birth control/protection: Condom     Comment: ablation    Other Topics Concern    Caffeine Concern No    Stress Concern Yes     Comment: pretty high daily    Special Diet No    Exercise No     Social Drivers of Health     Food Insecurity: Unknown (2025)    NCSS - Food Insecurity     Worried About Running Out of Food in the Last Year: No   Housing Stability: Not At Risk (2025)    NCSS - Housing/Utilities     Has Housing: Yes     Worried About Losing Housing: No      Unable to Get Utilities: No                  Physical Exam     ED Triage Vitals [03/13/25 1018]   /82   Pulse 92   Resp 16   Temp 97.7 °F (36.5 °C)   Temp src Temporal   SpO2 100 %   O2 Device None (Room air)       Current Vitals:   Vital Signs  BP: 94/66  Pulse: 74  Resp: 20  Temp: 97.7 °F (36.5 °C)  Temp src: Temporal    Oxygen Therapy  SpO2: 99 %  O2 Device: None (Room air)        Physical Exam  Vitals and nursing note reviewed.   Constitutional:       Appearance: She is not toxic-appearing.   HENT:      Head: Normocephalic.   Cardiovascular:      Rate and Rhythm: Normal rate.      Pulses: Normal pulses.      Heart sounds: Normal heart sounds.   Pulmonary:      Effort: Pulmonary effort is normal. No respiratory distress.      Breath sounds: Normal breath sounds.   Chest:      Chest wall: Tenderness present.   Abdominal:      General: There is no distension.      Palpations: Abdomen is soft.      Tenderness: There is no abdominal tenderness. There is no guarding or rebound.   Musculoskeletal:         General: Normal range of motion.      Cervical back: Neck supple.   Skin:     General: Skin is warm and dry.   Neurological:      General: No focal deficit present.      Mental Status: She is alert and oriented to person, place, and time.   Psychiatric:         Mood and Affect: Mood normal.         Behavior: Behavior normal.             ED Course     Labs Reviewed   CBC WITH DIFFERENTIAL WITH PLATELET - Abnormal; Notable for the following components:       Result Value    HGB 16.1 (*)     All other components within normal limits   D-DIMER - Abnormal; Notable for the following components:    D-Dimer 0.69 (*)     All other components within normal limits   COMP METABOLIC PANEL (14) - Normal   TROPONIN I HIGH SENSITIVITY - Normal   PROTHROMBIN TIME (PT) - Normal              EKG sinus rhythm 76 bpm.  Normal axis.  No ST elevations.  , QRS 88,  ms.  When compared to December 2024, no significant changes  are noted    Patient placed on cardiac monitor for telemetry monitoring secondary to cp, elevated dimer. Interpretation at bedside by me is sinus rhythm.       MDM      CTA CHEST (CPT=71275)    Result Date: 3/13/2025  CONCLUSION:  1. No acute abnormality in the chest.  No evidence of pulmonary embolism.    LOCATION:  [facility]   Dictated by (CST): Jaspreet Simons MD on 3/13/2025 at 11:03 AM     Finalized by (CST): Jaspreet Simons MD on 3/13/2025 at 11:05 AM       XR CHEST PA + LAT CHEST (CPT=71046)    Result Date: 3/13/2025  CONCLUSION: No acute cardiopulmonary abnormality.   LOCATION:  [facility]   Dictated by (CST): Jaspreet Simons MD on 3/13/2025 at 8:57 AM     Finalized by (CST): Jaspreet Simons MD on 3/13/2025 at 8:57 AM        I independent interpreted the CT of the chest without any obvious signs of large central embolism    Differential diagnosis includes, but not limited to, PE, costochondritis, GERD, ACS    External chart review demonstrates outpatient podiatry visit about a week and a half ago    55-year-old female presents for immediate care for elevated D-dimer for CT of the chest.  Reproducible left sternal border chest tenderness.  No pain at rest.  No pleurisy.  No mops this.  Lungs are clear, elevated dimer.  CTA negative.  Troponin negative.  EKG stable.  Well-appearing and nontoxic.  History of paroxysmal A-fib on aspirin.  Follow-up with her cardiologist Dr. Crespo as needed.  Return precaution provided, shared decision made utilized, discharged home after discussion of risk benefits and alternatives.  She is happy with this plan    Patient was screened and evaluated during this visit.  As the treating physician attending to the patient, I determined within reasonable clinical confidence and prior to discharge, that an emergency medical condition was not or was no longer present.  There was no indication for further evaluation, treatment, or admission on an emergency basis.  Comprehensive verbal and written  discharge and follow-up instructions were provided to help prevent relapse or worsening.  Patient was instructed to follow-up with their primary care provider for further evaluation and treatment, return immediately to ER for worsening, concerning, new, or changing/persisting symptoms. I discussed the case with the patient and they had no questions, complaints, or concerns.  Patient was comfortable going home.     Per the discharge paperwork, patients are encouraged to and given instructions on how to sign up for [facility], where they have access to their records, including any/all incidental findings.     This note was prepared using Dragon Medical voice recognition dictation software. As a result errors may occur. When identified these errors have been corrected. While every attempt is made to correct errors during dictation discrepancies may still exist    Note to patient: The 21st Century Cures Act makes medical notes like these available to patients in the interest of transparency. However, this is a medical document intended as peer to peer communication. It is written in medical language and may contain abbreviations or verbiage that are unfamiliar. It may appear blunt or direct. Medical documents are intended to carry relevant information, facts as evident, and the clinical opinion of the practitioner.       Heart Score:    HEART Score      Title      Criteria Score   Age: 45-64 Age Score: 1   History: Slightly Suspicious Hx Score: 0     EKG: Non-Spec Changes EKG Score: 1   HTN: No   Hypercholesterolemia: No   Atherosclerosis/PVD: No     DM: No   BMI>30kg/m2: Yes   Smoking: No   Family History: Yes         Other Risk Factor Score: 2             Lab Results   Component Value Date    TROP <0.045 07/30/2019    TROPHS <3 03/13/2025           HEART Score: 3        Risk of adverse cardiac event is 0.9-1.7%                    Medical Decision Making      Disposition and Plan     Clinical Impression:  1. Chest pain,  atypical    2. Elevated d-dimer         Disposition:  Discharge  3/13/2025 11:44 am    Follow-up:  Lu Rock MD  [address] [facility] [address]  [facility] [address] [phone]    Follow up      Natalie Pringle MD  [address]  [facility] [address]  [facility] [id number] [phone]    Follow up            Medications Prescribed:  Discharge Medication List as of 3/13/2025 11:48 AM              Supplementary Documentation:

## 2025-03-13 NOTE — ED INITIAL ASSESSMENT (HPI)
Sent from urgent care for substernal chest pain since yesterday . Had elevated D dimer and sent for further testing

## 2025-03-13 NOTE — ED PROVIDER NOTES
Patient Seen in: [facility]      History     Chief Complaint   Patient presents with    Chest Pain Angina     Mid sternal under left breast     Stated Complaint: chest pain    Subjective:   HPI      55-year-old female presents to the IC for evaluation of midsternal/left-sided chest pain since yesterday evening around 5 PM.  Symptoms onset while she was resting, sharp, improved with staying still.  Patient states any movement worsens the pain, including laying down.  No shortness of breath.  No nausea or vomiting.    History of afib, controlled with metoprolol. Reports no recent travel or procedure. Had calf pain 3 weeks ago, but negative DVT study.    Objective:     Past Medical History:    A-fib (HCC)    Anxiety    Constipation    Heart palpitations    Morbid obesity with BMI of 40.0-44.9, adult (HCC)    Obesity, unspecified    KARMA (obstructive sleep apnea)    AHI 5 REM AHI 10 Supine AHI 29 non-supine AHI 1 Sao2 Theodore 90%     Pain in joint, shoulder region    right    Palpitations    Presence of other cardiac implants and grafts    Stress    Ventricular tachycardia (HCC)    Wears glasses              Past Surgical History:   Procedure Laterality Date          x2    Colonoscopy                  Social History     Socioeconomic History    Marital status:    Tobacco Use    Smoking status: Never    Smokeless tobacco: Never   Vaping Use    Vaping status: Never Used   Substance and Sexual Activity    Alcohol use: Not Currently     Alcohol/week: 0.0 standard drinks of alcohol     Comment: rarely    Drug use: No    Sexual activity: Yes     Partners: Male     Birth control/protection: Condom     Comment: ablation    Other Topics Concern    Caffeine Concern No    Stress Concern Yes     Comment: pretty high daily    Special Diet No    Exercise No     Social Drivers of Health     Food Insecurity: Unknown (2025)    NCSS - Food Insecurity     Worried About Running Out of Food in the Last Year:  No   Housing Stability: Not At Risk (2/13/2025)    NCSS - Housing/Utilities     Has Housing: Yes     Worried About Losing Housing: No     Unable to Get Utilities: No              Review of Systems    Positive for stated complaint: chest pain  Other systems are as noted in HPI.  Constitutional and vital signs reviewed.      All other systems reviewed and negative except as noted above.    Physical Exam     ED Triage Vitals [03/13/25 0814]   /77   Pulse 79   Resp 18   Temp 97.4 °F (36.3 °C)   Temp src Temporal   SpO2 98 %   O2 Device None (Room air)       Current Vitals:   Vital Signs  BP: 125/77  Pulse: 79  Resp: 18  Temp: 97.4 °F (36.3 °C)  Temp src: Temporal    Oxygen Therapy  SpO2: 98 %  O2 Device: None (Room air)        Physical Exam  Vitals and nursing note reviewed.   Constitutional:       Appearance: She is well-developed.   HENT:      Head: Atraumatic.      Right Ear: External ear normal.      Left Ear: External ear normal.   Eyes:      Conjunctiva/sclera: Conjunctivae normal.   Cardiovascular:      Rate and Rhythm: Normal rate and regular rhythm.   Pulmonary:      Effort: Pulmonary effort is normal.      Breath sounds: Normal breath sounds.   Chest:      Chest wall: Tenderness (upper sternum) present.   Abdominal:      Palpations: Abdomen is soft.   Musculoskeletal:      Cervical back: Normal range of motion and neck supple.   Skin:     General: Skin is warm and dry.      Capillary Refill: Capillary refill takes less than 2 seconds.   Neurological:      Mental Status: She is alert and oriented to person, place, and time.   Psychiatric:         Mood and Affect: Mood normal.             ED Course     Labs Reviewed   D-DIMER (POC) - Abnormal; Notable for the following components:       Result Value    D-Dimer  (*)     All other components within normal limits   POCT ISTAT CHEM8 CARTRIDGE - Abnormal; Notable for the following components:    ISTAT Ionized Calcium 1.10 (*)     All other components  within normal limits   ISTAT TROPONIN - Normal   POCT CBC     EKG    Rate, intervals and axes as noted on EKG Report.  Rate: 76  Rhythm: Sinus Rhythm  Reading: no ST elevation, no change from prior                XR CHEST PA + LAT CHEST (CPT=71046)    Result Date: 3/13/2025  PROCEDURE:  XR CHEST PA + LAT CHEST (CPT=71046)  INDICATIONS:  chest pain  COMPARISON:  [facility] , XR, XR CHEST AP PORTABLE  (CPT=71045), 12/16/2024, 6:39 AM.  TECHNIQUE:  PA and lateral chest radiographs were obtained.  PATIENT STATED HISTORY: (As transcribed by Technologist)  Patient states she has had chest pain that is midsternal/under left breast since yesterday.    FINDINGS:  The cardiomediastinal silhouette is within normal limits.  There is no consolidation, effusion, or pneumothorax.  No aggressive osseous lesions are identified.            CONCLUSION: No acute cardiopulmonary abnormality.   LOCATION:  [facility]   Dictated by (CST): Jaspreet Simons MD on 3/13/2025 at 8:57 AM     Finalized by (CST): Jaspreet Simons MD on 3/13/2025 at 8:57 AM             MDM      55-year-old female with a history of A-fib (on aspirin) presents to the IC for evaluation of chest pain that is worse with laying down and movement.  Patient is concerned about a blood clot as she does have a history of A-fib and had left calf pain 3 weeks ago.    Differential diagnosis reflecting the complexity of care include: ACS, musculoskeletal CP, PE    Comorbidities that add complexity to management include:afib, obesity    External chart review was done and was noted:  1/27/25 negative DVT    History obtained by an independent source was from:    I have independently visualized the radiology images, my focus/limited interpretation: no infiltrates noted on CXR  Defer to radiologist for other/incidental findings         EKG with no ischemic changes.  Chest x-ray is clear.  Troponin is negative.  Dimer is minimally elevated.  Patient informed of all results.  Recommended transfer to  the ER for CT and higher level of care.  Explained the patient that this may be musculoskeletal pain.  All questions answered.      Medical Decision Making      Disposition and Plan     Clinical Impression:  1. Chest pain of uncertain etiology    2. Positive D dimer         Disposition:  Ic to ed  3/13/2025  9:40 am    Follow-up:  [facility]  [address] [place] [id number]              Medications Prescribed:  Discharge Medication List as of 3/13/2025  9:41 AM              Supplementary Documentation:

## 2025-03-13 NOTE — ED INITIAL ASSESSMENT (HPI)
Pt with co chest pain that is midsternal/under left breast that started yesterday. Pain in not radiating and  consistent unless pt is upright and not moving. Pt also with dizziness started at the same time.

## 2025-03-21 ENCOUNTER — HOSPITAL ENCOUNTER (OUTPATIENT)
Dept: MAMMOGRAPHY | Facility: HOSPITAL | Age: 55
Discharge: HOME | End: 2025-03-21
Attending: FAMILY MEDICINE
Payer: COMMERCIAL

## 2025-03-21 DIAGNOSIS — Z12.31 ENCOUNTER FOR SCREENING MAMMOGRAM FOR MALIGNANT NEOPLASM OF BREAST: ICD-10-CM

## 2025-03-21 PROCEDURE — 77063 BREAST TOMOSYNTHESIS BI: CPT | Performed by: FAMILY MEDICINE

## 2025-03-21 PROCEDURE — 77067 SCR MAMMO BI INCL CAD: CPT | Performed by: FAMILY MEDICINE

## 2025-03-25 ENCOUNTER — APPOINTMENT (OUTPATIENT)
Dept: GENERAL RADIOLOGY | Facility: HOSPITAL | Age: 55
End: 2025-03-25
Payer: COMMERCIAL

## 2025-03-25 ENCOUNTER — HOSPITAL ENCOUNTER (OUTPATIENT)
Facility: HOSPITAL | Age: 55
Setting detail: OBSERVATION
Discharge: HOME | End: 2025-03-27
Attending: EMERGENCY MEDICINE | Admitting: INTERNAL MEDICINE
Payer: COMMERCIAL

## 2025-03-25 DIAGNOSIS — R07.9 CHEST PAIN OF UNCERTAIN ETIOLOGY: ICD-10-CM

## 2025-03-25 DIAGNOSIS — R55 SYNCOPE, NEAR: Primary | ICD-10-CM

## 2025-03-25 LAB
ALBUMIN SERPL-MCNC: 4.7 G/DL (ref 3.2–4.8)
ALBUMIN/GLOB SERPL: 1.9 {RATIO} (ref 1–2)
ALP LIVER SERPL-CCNC: 95 U/L
ALT SERPL-CCNC: 13 U/L
ANION GAP SERPL CALC-SCNC: 9 MMOL/L (ref 0–18)
AST SERPL-CCNC: 16 U/L (ref ?–34)
BASOPHILS # BLD AUTO: 0.04 X10(3) UL (ref 0–0.2)
BASOPHILS NFR BLD AUTO: 0.3 %
BILIRUB SERPL-MCNC: 0.5 MG/DL (ref 0.3–1.2)
BUN BLD-MCNC: 12 MG/DL (ref 9–23)
CALCIUM BLD-MCNC: 9.6 MG/DL (ref 8.7–10.6)
CHLORIDE SERPL-SCNC: 103 MMOL/L (ref 98–112)
CO2 SERPL-SCNC: 27 MMOL/L (ref 21–32)
CREAT BLD-MCNC: 0.82 MG/DL
D DIMER PPP FEU-MCNC: 0.47 UG/ML FEU (ref ?–0.55)
EGFRCR SERPLBLD CKD-EPI 2021: 84 ML/MIN/1.73M2 (ref 60–?)
EOSINOPHIL # BLD AUTO: 0.06 X10(3) UL (ref 0–0.7)
EOSINOPHIL NFR BLD AUTO: 0.5 %
ERYTHROCYTE [DISTWIDTH] IN BLOOD BY AUTOMATED COUNT: 12.7 %
GLOBULIN PLAS-MCNC: 2.5 G/DL (ref 2–3.5)
GLUCOSE BLD-MCNC: 93 MG/DL (ref 70–99)
HCT VFR BLD AUTO: 44.1 %
HGB BLD-MCNC: 15.2 G/DL
IMM GRANULOCYTES # BLD AUTO: 0.04 X10(3) UL (ref 0–1)
IMM GRANULOCYTES NFR BLD: 0.3 %
LYMPHOCYTES # BLD AUTO: 3.48 X10(3) UL (ref 1–4)
LYMPHOCYTES NFR BLD AUTO: 30.1 %
MCH RBC QN AUTO: 30.6 PG (ref 26–34)
MCHC RBC AUTO-ENTMCNC: 34.5 G/DL (ref 31–37)
MCV RBC AUTO: 88.7 FL
MONOCYTES # BLD AUTO: 0.99 X10(3) UL (ref 0.1–1)
MONOCYTES NFR BLD AUTO: 8.6 %
NEUTROPHILS # BLD AUTO: 6.94 X10 (3) UL (ref 1.5–7.7)
NEUTROPHILS # BLD AUTO: 6.94 X10(3) UL (ref 1.5–7.7)
NEUTROPHILS NFR BLD AUTO: 60.2 %
OSMOLALITY SERPL CALC.SUM OF ELEC: 287 MOSM/KG (ref 275–295)
PLATELET # BLD AUTO: 268 10(3)UL (ref 150–450)
POTASSIUM SERPL-SCNC: 3.1 MMOL/L (ref 3.5–5.1)
PROT SERPL-MCNC: 7.2 G/DL (ref 5.7–8.2)
RBC # BLD AUTO: 4.97 X10(6)UL
SODIUM SERPL-SCNC: 139 MMOL/L (ref 136–145)
TROPONIN I SERPL HS-MCNC: <3 NG/L
WBC # BLD AUTO: 11.6 X10(3) UL (ref 4–11)

## 2025-03-25 PROCEDURE — 71045 X-RAY EXAM CHEST 1 VIEW: CPT | Performed by: EMERGENCY MEDICINE

## 2025-03-25 PROCEDURE — 99223 1ST HOSP IP/OBS HIGH 75: CPT | Performed by: INTERNAL MEDICINE

## 2025-03-25 RX ORDER — PROCHLORPERAZINE EDISYLATE 5 MG/ML
5 INJECTION INTRAMUSCULAR; INTRAVENOUS EVERY 8 HOURS PRN
Status: DISCONTINUED | OUTPATIENT
Start: 2025-03-25 | End: 2025-03-27

## 2025-03-25 RX ORDER — ONDANSETRON 2 MG/ML
4 INJECTION INTRAMUSCULAR; INTRAVENOUS EVERY 6 HOURS PRN
Status: DISCONTINUED | OUTPATIENT
Start: 2025-03-25 | End: 2025-03-27

## 2025-03-25 RX ORDER — SENNOSIDES 8.6 MG
17.2 TABLET ORAL NIGHTLY PRN
Status: DISCONTINUED | OUTPATIENT
Start: 2025-03-25 | End: 2025-03-27

## 2025-03-25 RX ORDER — ACETAMINOPHEN 500 MG
500 TABLET ORAL EVERY 4 HOURS PRN
Status: DISCONTINUED | OUTPATIENT
Start: 2025-03-25 | End: 2025-03-27

## 2025-03-25 RX ORDER — SODIUM PHOSPHATE, DIBASIC AND SODIUM PHOSPHATE, MONOBASIC 7; 19 G/230ML; G/230ML
1 ENEMA RECTAL ONCE AS NEEDED
Status: DISCONTINUED | OUTPATIENT
Start: 2025-03-25 | End: 2025-03-27

## 2025-03-25 RX ORDER — ASPIRIN 81 MG/1
81 TABLET, CHEWABLE ORAL DAILY
Status: DISCONTINUED | OUTPATIENT
Start: 2025-03-26 | End: 2025-03-27

## 2025-03-25 RX ORDER — BISACODYL 10 MG
10 SUPPOSITORY, RECTAL RECTAL
Status: DISCONTINUED | OUTPATIENT
Start: 2025-03-25 | End: 2025-03-27

## 2025-03-25 RX ORDER — FAMOTIDINE 20 MG/1
20 TABLET, FILM COATED ORAL DAILY
COMMUNITY
End: 2025-03-27

## 2025-03-25 RX ORDER — ENOXAPARIN SODIUM 100 MG/ML
0.5 INJECTION SUBCUTANEOUS EVERY 12 HOURS
Status: DISCONTINUED | OUTPATIENT
Start: 2025-03-25 | End: 2025-03-27

## 2025-03-25 RX ORDER — POLYETHYLENE GLYCOL 3350 17 G/17G
17 POWDER, FOR SOLUTION ORAL DAILY PRN
Status: DISCONTINUED | OUTPATIENT
Start: 2025-03-25 | End: 2025-03-27

## 2025-03-25 RX ORDER — LIDOCAINE HYDROCHLORIDE 20 MG/ML
10 SOLUTION OROPHARYNGEAL ONCE
Status: COMPLETED | OUTPATIENT
Start: 2025-03-25 | End: 2025-03-25

## 2025-03-25 RX ORDER — MAGNESIUM HYDROXIDE/ALUMINUM HYDROXICE/SIMETHICONE 120; 1200; 1200 MG/30ML; MG/30ML; MG/30ML
30 SUSPENSION ORAL ONCE
Status: COMPLETED | OUTPATIENT
Start: 2025-03-25 | End: 2025-03-25

## 2025-03-25 RX ORDER — PANTOPRAZOLE SODIUM 40 MG/1
40 TABLET, DELAYED RELEASE ORAL
Status: DISCONTINUED | OUTPATIENT
Start: 2025-03-26 | End: 2025-03-27

## 2025-03-25 RX ORDER — FLECAINIDE ACETATE 50 MG/1
50 TABLET ORAL 2 TIMES DAILY
Status: DISCONTINUED | OUTPATIENT
Start: 2025-03-26 | End: 2025-03-26

## 2025-03-25 RX ORDER — POTASSIUM CHLORIDE 1500 MG/1
40 TABLET, EXTENDED RELEASE ORAL ONCE
Status: COMPLETED | OUTPATIENT
Start: 2025-03-25 | End: 2025-03-25

## 2025-03-25 NOTE — ED PROVIDER NOTES
Patient Seen in: [facility] Emergency Department      History     Chief Complaint   Patient presents with    Chest Pain Angina     Stated Complaint:     Subjective:   The history is provided by the patient.         55-year-old female with history of atrial fibrillation on flecainide and metoprolol presented to the ER with near syncope.  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  She almost lost consciousness but did not.  Reports associated chest pain sensation that she she describes as a burning in her lower chest and upper abdomen.  She had a couple of elevated blood pressure readings on the floor, heart rate was in the 120s, was told to come in for evaluation.    Objective:     Past Medical History:    A-fib ([facility])    Anxiety    Constipation    Heart palpitations    Morbid obesity with BMI of 40.0-44.9, adult ([facility])    Obesity, unspecified    KARMA (obstructive sleep apnea)    AHI 5 REM AHI 10 Supine AHI 29 non-supine AHI 1 Sao2 Theodore 90%     Pain in joint, shoulder region    right    Palpitations    Presence of other cardiac implants and grafts    Stress    Ventricular tachycardia (HCC)    Wears glasses              Past Surgical History:   Procedure Laterality Date          x2    Colonoscopy                  Social History     Socioeconomic History    Marital status:    Tobacco Use    Smoking status: Never    Smokeless tobacco: Never   Vaping Use    Vaping status: Never Used   Substance and Sexual Activity    Alcohol use: Not Currently     Alcohol/week: 0.0 standard drinks of alcohol     Comment: rarely    Drug use: No    Sexual activity: Yes     Partners: Male     Birth control/protection: Condom     Comment: ablation    Other Topics Concern    Caffeine Concern No    Stress Concern Yes     Comment: pretty high daily    Special Diet No    Exercise No     Social Drivers of Health     Food  Insecurity: No Food Insecurity (3/25/2025)    NCSS - Food Insecurity     Worried About Running Out of Food in the Last Year: No     Ran Out of Food in the Last Year: No   Transportation Needs: No Transportation Needs (3/25/2025)    NCSS - Transportation     Lack of Transportation: No   Housing Stability: Not At Risk (3/25/2025)    NCSS - Housing/Utilities     Has Housing: Yes     Worried About Losing Housing: No     Unable to Get Utilities: No                  Physical Exam     ED Triage Vitals [03/25/25 1531]   /79   Pulse 90   Resp 14   Temp 98.2 °F (36.8 °C)   Temp src Oral   SpO2 100 %   O2 Device None (Room air)       Current Vitals:   Vital Signs  BP: 117/66  Pulse: 70  Resp: 24  Temp: 98.2 °F (36.8 °C)  Temp src: Oral  MAP (mmHg): 81    Oxygen Therapy  SpO2: 98 %  O2 Device: None (Room air)        Physical Exam  Vitals and nursing note reviewed.   Constitutional:       General: She is not in acute distress.     Appearance: She is well-developed. She is not ill-appearing.   HENT:      Head: Normocephalic and atraumatic.   Eyes:      Extraocular Movements: Extraocular movements intact.      Pupils: Pupils are equal, round, and reactive to light.   Cardiovascular:      Rate and Rhythm: Normal rate and regular rhythm.      Pulses: Normal pulses.      Heart sounds: Normal heart sounds.   Pulmonary:      Effort: Pulmonary effort is normal. No respiratory distress.      Breath sounds: Normal breath sounds.   Abdominal:      General: Abdomen is flat. There is no distension.      Palpations: Abdomen is soft.      Tenderness: There is no abdominal tenderness.   Musculoskeletal:      Cervical back: Neck supple.   Skin:     General: Skin is dry.   Neurological:      Mental Status: She is alert and oriented to person, place, and time.   Psychiatric:         Mood and Affect: Mood normal.         Behavior: Behavior normal.             ED Course     Labs Reviewed   CBC WITH DIFFERENTIAL WITH PLATELET - Abnormal;  Notable for the following components:       Result Value    WBC 11.6 (*)     All other components within normal limits   COMP METABOLIC PANEL (14) - Abnormal; Notable for the following components:    Potassium 3.1 (*)     All other components within normal limits   TROPONIN I HIGH SENSITIVITY - Normal   TROPONIN I HIGH SENSITIVITY - Normal   D-DIMER - Normal   RAINBOW DRAW LAVENDER   RAINBOW DRAW LIGHT GREEN   RAINBOW DRAW BLUE   RAINBOW DRAW BLUE            ED Course as of 03/25/25 2039  ------------------------------------------------------------  Time: 03/25 1634  Comment: EKG INTERPRETATION  Time: 3:35 PM  Normal sinus rhythm, ventricular rate is  69, left axis deviation, Q waves in V1-V2  Normal axis  Normal AL, QRS, and QTc intervals  No chamber enlargement  Normal ST-T segments  EKG is unchanged compared to prior EKG completed on March 13, 2025.  I, the emergency physician, independently interpreted this EKG in the absence of a cardiologist.    ------------------------------------------------------------  Time: 03/25 1636  Comment: Reviewed outside records, normal stress echo last year.  ------------------------------------------------------------  Time: 03/25 2036  Comment: Independently interpreted labs, mild hypokalemia noted.  Negative troponin, negative D-dimer.  ------------------------------------------------------------  Time: 03/25 2037  Comment: Independent interpretation of the CHEST X-RAY shows the trachea is midline, normal cardiac size and contour. No pleural effusions.  No pneumothorax.  No infiltrates or pulmonary edema.                [facility]          Admission disposition: 3/25/2025  7:48 PM           Medical Decision Making    Differential diagnosis includes orthostatic dizziness, vasovagal episode, cardiac arrhythmia, CHF, ACS, pulmonary embolism    55-year-old female with history of atrial fibrillation on flecainide and metoprolol presenting with unprovoked episode of near syncope with  prodrome, possible vasovagal episode  Cardiopulmonary workup has been largely unremarkable, no ectopy on the monitor, no ischemic changes on EKG  Patient with persistent chest pain and dizziness throughout ED course, plan for observation admission for further evaluation      Amount and/or Complexity of Data Reviewed  Labs: ordered. Decision-making details documented in ED Course.  Radiology: ordered and independent interpretation performed. Decision-making details documented in ED Course.  ECG/medicine tests: ordered and independent interpretation performed. Decision-making details documented in ED Course.    Risk  Decision regarding hospitalization.  Diagnosis or treatment significantly limited by social determinants of health.        Disposition and Plan     Clinical Impression:  1. Syncope, near    2. Chest pain of uncertain etiology         Disposition:  Admit  3/25/2025  7:48 pm    Follow-up:  No follow-up provider specified.        Medications Prescribed:  Current Discharge Medication List              Supplementary Documentation:         Hospital Problems       Present on Admission  Date Reviewed: 3/13/2025            ICD-10-CM Noted POA    * (Principal) Syncope, near R55 3/25/2025 Unknown

## 2025-03-25 NOTE — ED INITIAL ASSESSMENT (HPI)
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

## 2025-03-26 ENCOUNTER — APPOINTMENT (OUTPATIENT)
Dept: CT IMAGING | Facility: HOSPITAL | Age: 55
End: 2025-03-26
Attending: INTERNAL MEDICINE
Payer: COMMERCIAL

## 2025-03-26 ENCOUNTER — APPOINTMENT (OUTPATIENT)
Dept: CV DIAGNOSTICS | Facility: HOSPITAL | Age: 55
End: 2025-03-26
Attending: INTERNAL MEDICINE
Payer: COMMERCIAL

## 2025-03-26 LAB
ANION GAP SERPL CALC-SCNC: 7 MMOL/L (ref 0–18)
ATRIAL RATE: 76 BPM
BASOPHILS # BLD AUTO: 0.05 X10(3) UL (ref 0–0.2)
BASOPHILS NFR BLD AUTO: 0.7 %
BUN BLD-MCNC: 11 MG/DL (ref 9–23)
CALCIUM BLD-MCNC: 8.9 MG/DL (ref 8.7–10.6)
CHLORIDE SERPL-SCNC: 110 MMOL/L (ref 98–112)
CO2 SERPL-SCNC: 27 MMOL/L (ref 21–32)
CREAT BLD-MCNC: 0.71 MG/DL
EGFRCR SERPLBLD CKD-EPI 2021: 100 ML/MIN/1.73M2 (ref 60–?)
EOSINOPHIL # BLD AUTO: 0.06 X10(3) UL (ref 0–0.7)
EOSINOPHIL NFR BLD AUTO: 0.9 %
ERYTHROCYTE [DISTWIDTH] IN BLOOD BY AUTOMATED COUNT: 13 %
GLUCOSE BLD-MCNC: 86 MG/DL (ref 70–99)
HCT VFR BLD AUTO: 38.3 %
HGB BLD-MCNC: 13.3 G/DL
IMM GRANULOCYTES # BLD AUTO: 0 X10(3) UL (ref 0–1)
IMM GRANULOCYTES NFR BLD: 0 %
LYMPHOCYTES # BLD AUTO: 3.47 X10(3) UL (ref 1–4)
LYMPHOCYTES NFR BLD AUTO: 51.3 %
MCH RBC QN AUTO: 30.4 PG (ref 26–34)
MCHC RBC AUTO-ENTMCNC: 34.7 G/DL (ref 31–37)
MCV RBC AUTO: 87.4 FL
MONOCYTES # BLD AUTO: 0.57 X10(3) UL (ref 0.1–1)
MONOCYTES NFR BLD AUTO: 8.4 %
NEUTROPHILS # BLD AUTO: 2.62 X10 (3) UL (ref 1.5–7.7)
NEUTROPHILS # BLD AUTO: 2.62 X10(3) UL (ref 1.5–7.7)
NEUTROPHILS NFR BLD AUTO: 38.7 %
OSMOLALITY SERPL CALC.SUM OF ELEC: 297 MOSM/KG (ref 275–295)
P AXIS: 13 DEGREES
P-R INTERVAL: 136 MS
PLATELET # BLD AUTO: 219 10(3)UL (ref 150–450)
POTASSIUM SERPL-SCNC: 4 MMOL/L (ref 3.5–5.1)
Q-T INTERVAL: 380 MS
Q-T INTERVAL: 396 MS
QRS DURATION: 88 MS
QRS DURATION: 90 MS
QTC CALCULATION (BEZET): 407 MS
QTC CALCULATION (BEZET): 445 MS
R AXIS: -24 DEGREES
R AXIS: 9 DEGREES
RBC # BLD AUTO: 4.38 X10(6)UL
SODIUM SERPL-SCNC: 144 MMOL/L (ref 136–145)
T AXIS: -23 DEGREES
T AXIS: 20 DEGREES
TROPONIN I SERPL HS-MCNC: <3 NG/L
TROPONIN I SERPL HS-MCNC: <3 NG/L
VENTRICULAR RATE: 69 BPM
VENTRICULAR RATE: 76 BPM
WBC # BLD AUTO: 6.8 X10(3) UL (ref 4–11)

## 2025-03-26 PROCEDURE — 93306 TTE W/DOPPLER COMPLETE: CPT | Performed by: INTERNAL MEDICINE

## 2025-03-26 PROCEDURE — 99232 SBSQ HOSP IP/OBS MODERATE 35: CPT | Performed by: STUDENT IN AN ORGANIZED HEALTH CARE EDUCATION/TRAINING PROGRAM

## 2025-03-26 PROCEDURE — 75574 CT ANGIO HRT W/3D IMAGE: CPT | Performed by: INTERNAL MEDICINE

## 2025-03-26 RX ORDER — METOPROLOL TARTRATE 50 MG
50 TABLET ORAL ONCE AS NEEDED
Status: DISCONTINUED | OUTPATIENT
Start: 2025-03-26 | End: 2025-03-27

## 2025-03-26 RX ORDER — METOPROLOL TARTRATE 50 MG
50 TABLET ORAL ONCE
Status: DISCONTINUED | OUTPATIENT
Start: 2025-03-26 | End: 2025-03-27

## 2025-03-26 RX ORDER — METOPROLOL TARTRATE 100 MG/1
100 TABLET ORAL ONCE
Status: DISCONTINUED | OUTPATIENT
Start: 2025-03-26 | End: 2025-03-27

## 2025-03-26 RX ORDER — METOPROLOL TARTRATE 100 MG/1
100 TABLET ORAL ONCE AS NEEDED
Status: DISCONTINUED | OUTPATIENT
Start: 2025-03-27 | End: 2025-03-27

## 2025-03-26 RX ORDER — METOPROLOL TARTRATE 100 MG/1
100 TABLET ORAL ONCE
Status: DISCONTINUED | OUTPATIENT
Start: 2025-03-27 | End: 2025-03-27

## 2025-03-26 RX ORDER — DILTIAZEM HYDROCHLORIDE 5 MG/ML
INJECTION INTRAVENOUS
Status: COMPLETED
Start: 2025-03-26 | End: 2025-03-26

## 2025-03-26 RX ORDER — METOPROLOL TARTRATE 1 MG/ML
5 INJECTION, SOLUTION INTRAVENOUS SEE ADMIN INSTRUCTIONS
Status: DISCONTINUED | OUTPATIENT
Start: 2025-03-26 | End: 2025-03-27

## 2025-03-26 RX ORDER — METOPROLOL TARTRATE 100 MG/1
100 TABLET ORAL ONCE AS NEEDED
Status: DISCONTINUED | OUTPATIENT
Start: 2025-03-26 | End: 2025-03-27

## 2025-03-26 RX ORDER — METOPROLOL SUCCINATE 25 MG/1
12.5 TABLET, EXTENDED RELEASE ORAL
Status: DISCONTINUED | OUTPATIENT
Start: 2025-03-26 | End: 2025-03-27

## 2025-03-26 RX ORDER — METOPROLOL TARTRATE 50 MG
50 TABLET ORAL ONCE
Status: DISCONTINUED | OUTPATIENT
Start: 2025-03-27 | End: 2025-03-27

## 2025-03-26 RX ORDER — METOPROLOL TARTRATE 100 MG/1
100 TABLET ORAL ONCE AS NEEDED
Status: COMPLETED | OUTPATIENT
Start: 2025-03-26 | End: 2025-03-26

## 2025-03-26 RX ORDER — SODIUM CHLORIDE 9 MG/ML
INJECTION, SOLUTION INTRAVENOUS CONTINUOUS
Status: DISCONTINUED | OUTPATIENT
Start: 2025-03-26 | End: 2025-03-26

## 2025-03-26 RX ORDER — FLECAINIDE ACETATE 50 MG/1
50 TABLET ORAL 2 TIMES DAILY
Status: DISCONTINUED | OUTPATIENT
Start: 2025-03-26 | End: 2025-03-27

## 2025-03-26 RX ORDER — NITROGLYCERIN 0.4 MG/1
TABLET SUBLINGUAL
Status: COMPLETED
Start: 2025-03-26 | End: 2025-03-26

## 2025-03-26 RX ORDER — LORAZEPAM 2 MG/ML
1 INJECTION INTRAMUSCULAR
Status: COMPLETED | OUTPATIENT
Start: 2025-03-26 | End: 2025-03-26

## 2025-03-26 RX ORDER — DILTIAZEM HYDROCHLORIDE 5 MG/ML
5 INJECTION INTRAVENOUS SEE ADMIN INSTRUCTIONS
Status: DISCONTINUED | OUTPATIENT
Start: 2025-03-26 | End: 2025-03-26

## 2025-03-26 RX ORDER — METOPROLOL TARTRATE 50 MG
50 TABLET ORAL ONCE AS NEEDED
Status: DISCONTINUED | OUTPATIENT
Start: 2025-03-27 | End: 2025-03-27

## 2025-03-26 RX ORDER — NITROGLYCERIN 0.4 MG/1
0.4 TABLET SUBLINGUAL ONCE
Status: COMPLETED | OUTPATIENT
Start: 2025-03-26 | End: 2025-03-26

## 2025-03-26 RX ORDER — FLECAINIDE ACETATE 50 MG/1
50 TABLET ORAL 2 TIMES DAILY
Status: DISCONTINUED | OUTPATIENT
Start: 2025-03-26 | End: 2025-03-26

## 2025-03-26 RX ORDER — METOPROLOL TARTRATE 50 MG
50 TABLET ORAL ONCE AS NEEDED
Status: COMPLETED | OUTPATIENT
Start: 2025-03-26 | End: 2025-03-26

## 2025-03-26 RX ORDER — DILTIAZEM HYDROCHLORIDE 5 MG/ML
5 INJECTION INTRAVENOUS EVERY 5 MIN PRN
Status: COMPLETED | OUTPATIENT
Start: 2025-03-26 | End: 2025-03-26

## 2025-03-26 NOTE — PROGRESS NOTES
Angelique Mccloud Patient Status:  Observation    1970 MRN [id number]   Location [facility] 8NE-A Attending Celina Hernandez, DO   Hosp Day # 0 PCP Lu Rock MD     Cardiology Nocturnal APN Note    Briefly: (Documentation from chart review)     Angelique Mccloud is a 6 y/o female who presented with near syncope and has a PMH/PSH of:       Past Medical History:    A-fib ([facility])    Anxiety    Constipation    Heart palpitations    Morbid obesity with BMI of 40.0-44.9, adult ([facility])    Obesity, unspecified    KARMA (obstructive sleep apnea)    AHI 5 REM AHI 10 Supine AHI 29 non-supine AHI 1 Sao2 Theodore 90%     Pain in joint, shoulder region    right    Palpitations    Presence of other cardiac implants and grafts    Stress    Ventricular tachycardia ([facility])    Wears glasses       Primary Cardiologist Pion    Vital Signs:       3/25/2025     9:18 PM 3/25/2025    11:15 PM   Vitals History   /91 113/73   BP Location Left arm Left arm   Pulse 76 70   Resp  20   Temp  96.8 °F (36 °C)   SpO2 96 % 98 %   Weight 219 lbs 9 oz    BMI 38.9 kg/m2         Labs:   Lab Results   Component Value Date    WBC 11.6 2025    HGB 15.2 2025    HCT 44.1 2025    .0 2025    CREATSERUM 0.82 2025    BUN 12 2025     2025    K 3.1 2025     2025    CO2 27.0 2025    GLU 93 2025    CA 9.6 2025    ALB 4.7 2025    ALKPHO 95 2025    BILT 0.5 2025    TP 7.2 2025    AST 16 2025    ALT 13 2025    DDIMER 0.47 2025    TROPHS <3 2025       Diagnostics:   XR CHEST AP PORTABLE  (CPT=71045)    Result Date: 3/25/2025  PROCEDURE:  XR CHEST AP PORTABLE  (CPT=71045)  TECHNIQUE:  AP chest radiograph was obtained.  COMPARISON:  [facility], XR, XR CHEST PA + LAT CHEST (PYV=82653), 3/13/2025, 8:44 AM.  [facility] , XR, XR CHEST AP PORTABLE  (CPT=71045), 2024, 6:39 AM.  INDICATIONS:  CP   PATIENT STATED HISTORY: (As transcribed by Technologist)  Chest pain.    FINDINGS:  Stable cardiac size.  No pleural effusion.  No pneumothorax.  No focal consolidation.            CONCLUSION:  No acute pulmonary findings.   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       OZ Valdez  [facility]  3/26/2025  2:12 AM         [1] No Known Allergies

## 2025-03-26 NOTE — PLAN OF CARE
Assumed care of pt at 0730. A/ox4, rm air, SR on tele. No reports of pain. Breath sounds clear upon auscultation. Denies cough. Does report continued lightheadedness at rest and with ambulation. Patient hesitant to take hospital-supplied metoprolol prior to gated CTA due to hx of complications after taking it in the past. Education given, pt agreeable to take. Per cardiology NP, no need for pt to receive her home dose of metoprolol today, and instead will resume tomorrow. Pt declined lovenox. Impaired vision - wears glasses.    Discussed POC w/ pt & daughter at bedside.    1726: Updated hospitalist that patient is cleared to discharge from cardiology standpoint. However, the patient continues to feel dizzy, more w/ ambulation. Hospitalist wanting pt to stay to observe her overnight    Problem: CARDIOVASCULAR - ADULT  Goal: Absence of cardiac arrhythmias or at baseline  Description: INTERVENTIONS:- Continuous cardiac monitoring, monitor vital signs, obtain 12 lead EKG if indicated- Evaluate effectiveness of antiarrhythmic and heart rate control medications as ordered- Initiate emergency measures for life threatening arrhythmias- Monitor electrolytes and administer replacement therapy as ordered  Outcome: Progressing     Problem: SAFETY ADULT - FALL  Goal: Free from fall injury  Description: INTERVENTIONS:- Assess pt frequently for physical needs- Identify cognitive and physical deficits and behaviors that affect risk of falls.- Gerlach fall precautions as indicated by assessment.- Educate pt/family on patient safety including physical limitations- Instruct pt to call for assistance with activity based on assessment- Modify environment to reduce risk of injury- Provide assistive devices as appropriate- Consider OT/PT consult to assist with strengthening/mobility- Encourage toileting schedule  Outcome: Progressing     Problem: Patient/Family Goals  Goal: Patient/Family Long Term Goal  Description: Patient's Long Term  Goal: To go home    Interventions:  - MD rounding, medication management, monitor labs, gated CTA, monitor dizziness  - See additional Care Plan goals for specific interventions  Outcome: Progressing  Goal: Patient/Family Short Term Goal  Description: Patient's Short Term Goal: Remain pain free    Interventions:   - Pain assessments q4, pain meds PRN  - See additional Care Plan goals for specific interventions  Outcome: Progressing

## 2025-03-26 NOTE — PROGRESS NOTES
03/25/25 2114 03/25/25 2116 03/25/25 2118   Vital Signs   Pulse 69 69 76   Heart Rate Source Monitor Monitor Monitor   Resp 22  --   --    Respiratory Quality Normal Normal Normal   /77 115/81 (!) 120/91   MAP (mmHg) 93 91 (!) 102   BP Location Left arm Left arm Left arm   BP Method Automatic Automatic Automatic   Patient Position Lying Sitting Standing     Asymptomatic

## 2025-03-26 NOTE — PROGRESS NOTES
03/26/25 1520 03/26/25 1522 03/26/25 1525   Vital Signs   /71 111/79 115/89   MAP (mmHg) 86 91 97   BP Location Left arm Left arm Left arm   BP Method Automatic Automatic Automatic   Patient Position Lying Sitting Standing     Pt was swaying while standing for orthostatic BP. Dizziness w/ standing

## 2025-03-26 NOTE — CONSULTS
Angelique Mccloud Patient Status:  Observation    1970 MRN [id number]   [facility] 8NE-A Attending Nazario Vogel, *   Hosp Day # 0 PCP Lu Rock MD     Reason for Consultation:  Chest pain, dizziness    History of Present Illness:  Angelique Mccloud is a a(n) 55 year old female w with history of atrial fibrillation, status post ablation currently on flecainide and metoprolol presented to the hospital with complaints of tachycardia dizziness.  She was also complaining of chest discomfort.  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  She fell like she was in a pass out.    History:  Past Medical History:    A-fib (HCC)    Anxiety    Constipation    Heart palpitations    Morbid obesity with BMI of 40.0-44.9, adult (HCC)    Obesity, unspecified    KARMA (obstructive sleep apnea)    AHI 5 REM AHI 10 Supine AHI 29 non-supine AHI 1 Sao2 Theodore 90%     Pain in joint, shoulder region    right    Palpitations    Presence of other cardiac implants and grafts    Stress    Ventricular tachycardia (HCC)    Wears glasses     Past Surgical History:   Procedure Laterality Date          x2    Colonoscopy       Family History   Problem Relation Age of Onset    Cancer Father         colon?    Diabetes Father     Crohn's Disease Father     Hypertension Father     Ulcerative Colitis Mother     Ulcerative Colitis Son     Cancer Paternal Grandmother         mandibular cancer    Diabetes Sister     Heart Attack Sister       reports that she has never smoked. She has never used smokeless tobacco. She reports that she does not currently use alcohol. She reports that she does not use drugs.    Allergies:  Allergies[1]    Medications:    Current Facility-Administered Medications:     flecainide (Tambocor) tab 50 mg \"Patient Supplied\", 50 mg, Oral, BID    metoprolol succinate ER (Toprol XL) 24 hr tab 25 mg Dose=12.5 mg \"Patient Supplied\", 12.5 mg, Oral, Daily Beta  Blocker    metoprolol tartrate (Lopressor) tab 50 mg, 50 mg, Oral, Once PRN **OR** metoprolol tartrate (Lopressor) tab 100 mg, 100 mg, Oral, Once PRN    metoprolol tartrate (Lopressor) tab 50 mg, 50 mg, Oral, Once **OR** metoprolol tartrate (Lopressor) tab 100 mg, 100 mg, Oral, Once    [START ON 3/27/2025] metoprolol tartrate (Lopressor) tab 50 mg, 50 mg, Oral, Once **OR** [START ON 3/27/2025] metoprolol tartrate (Lopressor) tab 100 mg, 100 mg, Oral, Once    [START ON 3/27/2025] metoprolol tartrate (Lopressor) tab 50 mg, 50 mg, Oral, Once PRN **OR** [START ON 3/27/2025] metoprolol tartrate (Lopressor) tab 100 mg, 100 mg, Oral, Once PRN    aspirin chewable tab 81 mg, 81 mg, Oral, Daily    melatonin tab 3 mg, 3 mg, Oral, Nightly PRN    enoxaparin (Lovenox) 60 MG/0.6ML SUBQ injection 50 mg, 0.5 mg/kg, Subcutaneous, q12h    acetaminophen (Tylenol Extra Strength) tab 500 mg, 500 mg, Oral, Q4H PRN    polyethylene glycol (PEG 3350) (Miralax) 17 g oral packet 17 g, 17 g, Oral, Daily PRN    sennosides (Senokot) tab 17.2 mg, 17.2 mg, Oral, Nightly PRN    bisacodyl (Dulcolax) 10 MG rectal suppository 10 mg, 10 mg, Rectal, Daily PRN    fleet enema (Fleet) rectal enema 133 mL, 1 enema, Rectal, Once PRN    ondansetron (Zofran) 4 MG/2ML injection 4 mg, 4 mg, Intravenous, Q6H PRN    prochlorperazine (Compazine) 10 MG/2ML injection 5 mg, 5 mg, Intravenous, Q8H PRN    pantoprazole (Protonix) DR tab 40 mg, 40 mg, Oral, QAM AC    Review of Systems:  A comprehensive review of systems was negative if not otherwise mention in above HPI.    /87 (BP Location: Left arm)   Pulse 93   Temp 97.4 °F (36.3 °C) (Axillary)   Resp 18   Ht 5' 3\" (1.6 m)   Wt 219 lb 9.3 oz (99.6 kg)   LMP 2013   SpO2 99%   BMI 38.90 kg/m²   Temp (24hrs), Av.3 °F (36.3 °C), Min:96.8 °F (36 °C), Max:98.2 °F (36.8 °C)       Intake/Output Summary (Last 24 hours) at 3/26/2025 1113  Last data filed at 3/26/2025 0800  Gross per 24 hour   Intake 200 ml    Output 1 ml   Net 199 ml     Wt Readings from Last 3 Encounters:   03/25/25 219 lb 9.3 oz (99.6 kg)   03/13/25 227 lb 15.3 oz (103.4 kg)   03/05/25 228 lb (103.4 kg)       Physical Exam:  General: Alert and oriented x 3. No apparent distress. No respiratory or constitutional distress.  HEENT: Normocephalic, anicteric sclera, neck supple.  Neck: No JVD, carotids 2+, no bruits.  Cardiac: Regular rate and rhythm. S1, S2 normal. No murmur, pericardial rub, S3.  Lungs: Clear without wheezes, rales, rhonchi or dullness.  Normal excursions and effort.  Abdomen: Soft, non-tender. BS-present.  Extremities: Without clubbing, cyanosis or edema.  Peripheral pulses are 2+.  Neurologic: Alert and oriented, normal affect.  Skin: Warm and dry.     Laboratory Data:  Lab Results   Component Value Date    WBC 6.8 03/26/2025    HGB 13.3 03/26/2025    HCT 38.3 03/26/2025    .0 03/26/2025    CREATSERUM 0.71 03/26/2025    BUN 11 03/26/2025     03/26/2025    K 4.0 03/26/2025     03/26/2025    CO2 27.0 03/26/2025    GLU 86 03/26/2025    CA 8.9 03/26/2025    ALB 4.7 03/25/2025    ALKPHO 95 03/25/2025    BILT 0.5 03/25/2025    TP 7.2 03/25/2025    AST 16 03/25/2025    ALT 13 03/25/2025    DDIMER 0.47 03/25/2025         Impression:  Syncope, near  Chest pain  Hx of A Fib s/p ablation      Recommendations:  Continue home meds  Tele monitoring reviewed, no events overnight  Troponin negative, no changes in EKG  CCTA of coronaries ordered  IVF fluids  Check orthostatics  Possible discharge today if patient feeling better    Thank you for allowing me to participate in the care of your patient.    Abby Baig MD  3/26/2025  11:13 AM       [1] No Known Allergies

## 2025-03-26 NOTE — ED QUICK NOTES
Orders for admission, patient is aware of plan and ready to go upstairs. Any questions, please call ED RN Radha at extension [phone].     Patient Covid vaccination status: Fully vaccinated     COVID Test Ordered in ED: None    COVID Suspicion at Admission: N/A    Running Infusions:  None    Mental Status/LOC at time of transport: A/A/O x 4    Other pertinent information: 1st Troponin - Negative. 2nd Trop sent - result pending as of this writing.  CIWA score: N/A   NIH score:  N/A

## 2025-03-26 NOTE — IMAGING NOTE
Patient here in CT Rm 4. HR 80s-90s. Patient states she is anxious. Procedure explained. Informed patient that we will send her back to room due to HR still elevated and she needs to have anti-anxiety med to be given to help with HR control. Spoke to Mary ALEMAN regarding plan.

## 2025-03-26 NOTE — PLAN OF CARE
CCTA results d/w Dr. Baig. Orthostatic negative. Pt seen ambulating in the hallway with steady gait. Mild dizziness per RN. Vitals stable. Ok to discharge from Cardiology standpoint.     JENNIFER Chambers, FPA-APRN, FNP-BC, CCK   3/26/2025  5:20 PM  Ph [phone] ([place])  Ph [phone] ([place])

## 2025-03-26 NOTE — HISTORICAL OFFICE NOTE
Facility Logo [facility]  [address], [address] [place] [address]  [phone]      Angelique Mccloud  Progress Note  Demographics:  Name: Angelique Mccloud YOB: 1970  Age: 54, Female Medical Record No: [id number]  Visited Date/Time: 01/09/2025 09:30 AM    Chief Complaints  ED visit- 12/16 for CP angina  History of Present Illness  Patient presents for hospital follow up.    Recently admitted with chest pain. Notes episode happened at rest. She had left sided chest pain that radiated to her left arm and jaw which prompted her evaluation. ACS was ruled out. Stress echo with abnormal EKG 1-1.5 mm of ST segment depression with normal stress echocardiogram images.     She has not had further chest pain. She has resumed omeprazole. She is fairly sedentary and notes weight gain after starting metoprolol.    She monitors her rhythm with Kardia. No recurrence of AF. She will capture PACs on strips.     Patient denies any cardiovascular complaints.  Denies any chest pain, shortness of breath, presyncope or syncopal events.  Denies orthopnea, PND or lower extremity edema.   She notes intermittent palpitations which correlate with PACs on Kardia device. No sustained palpitations.   Cardiac risk factors Never smoked  Past Medical History  1.History of atrial fibrillation  2.Encounter for monitoring flecainide therapy  Past Surgical History  1.S/P ablation of atrial fibrillation  2.Encounter for loop recorder at end of battery life  Family History  1. Father - No family history of coronary artery disease  Social History  Smoking status Never smoked  Tobacco usage - No (Non-smoker for personal reasons (finding))  Alcohol usage - Yes (none)  Review of systems  Cardiovascular Palpitations  No history of Chest pain, ALLRED, Syncope, PND, Orthopnea, Edema and Claudication  Physical Examination  Vitals Right Arm Sitting  / 62 mmHg, Pulse rate 74 bpm, Regular, Height in 5' 3\", BMI:  40.2, Weight in 227 lbs (or) 102.97 kgs and BSA : 2.19 cc/m²  General Appearance No Acute Distress and Appropriate  Head/Eyes/Ears/Nose/Mouth/Throat PERRLA and Mucous membranes Moist  Neck Normal carotid pulsations, No carotid bruits and No JVD  Respiratory Unlabored, Lungs clear with normal breath sounds and Equal bilaterally  Cardiovascular Intact distal pulses and Regular rhythm. Normal rate present. Normal and normal S1 and S2    Gastrointestinal Abdomen soft, Non-tender and Normoactive bowel sounds  Gait Normal gait  Lower Extremities Pulses 2+ and equal bilaterally and No edema  Skin Warm and dry and Intact  Neurologic / Psychiatric Alert and Oriented  Speech Normal speech  Allergies  No known medication allergies.  Medications (Info obtained by: Verbal)  1.aspirin 81 MG tablet, 1 tablet daily  2.Cholecalciferol (VITAMIN D) 2000 units capsule, Take 1 capsule by mouth.  3.FLECAINIDE ACETATE 50 MG TAB, TAKE 1 TABLET BY MOUTH TWICE A DAY  4.magnesium oxide (MAG-OX) 400 MG tablet, 1 tablet daily.  5.metoprolol succinate ER 25 mg tablet,extended release 24 hr, Take one-half tablet orally once a day.  6.omeprazole 20 mg capsule,delayed release, Take 1 capsule orally once a day.  7.Zinc-15 66 mg tablet, Take 1 tablet orally once a day.  Impression  1.S/P ablation of atrial fibrillation  2.History of atrial fibrillation  Assessment & Plan  PAF status post ablation. No recurrent AF since the ablation. Monitors rhythm with Braxton. On Flecainide, metoprolol.     Recent admission with chest pain, atypical. Stress echo with normal echo images, abnormal treadmill EKG. She denies any further chest pain which was atypical. She is fairly sedentary but denies angina. Her symptoms have improved with reintroduction of omeprazole. Instructed her to notify office if chest pain returns would have a low threshold for proceeding with CCTA.    PACs, overall well controlled on flecainide, low dose metoprolol. She reports SE with  metoprolol but would like to stay on current regimen.       Plan:  - Same medications  - Continue omeprazole 20 mg x 1  month then stop; then change to pepcid (famotidine) 20 mg daily   - Notify office if you have any recurrent chest pains; would have low threshold for getting coronary CTA  - Refill flecainide and metoprolol x 1 year   - Follow up with Dr. Pringle 6-8 weeks     Increased BMI: Provide patient with information regarding diet and lifestyle changes.  Medications Ordered  1.metoprolol succinate ER 25 mg tablet,extended release 24 hr, Take one-half tablet orally once a day.  Future appointments  1.Referral Visit - Lu Rock ([email or web address]) : (Today)  2.Follow up visit - Natalie Pringle MD (6 Weeks)  Miscellaneous  1.Weight monitoring (regime/therapy)  Nurses documentation  Upcoming surgeries: None  Assistive devices: None   Refills: Toprol XL 12.5mg QD  EKG: No   (MS, CMA)   Patient instructions  Plan:  - Same medications  - Continue omeprazole 20 mg x 1  month then stop; then change to pepcid (famotidine) 20 mg daily   - Notify office if you have any recurrent chest pains; would have low threshold for getting coronary CTA  - Follow up with Dr. Pringle 6-8 weeks   CPOE Orders carried out by: Natalie Pringle MD and Victoria Holcomb  Care Providers: Charlette CLINTON, Victoria Holcomb and Alpa Keenan  Electronically Authenticated by  Charlette CLINTON  01/09/2025 11:44:36 AM  Disclaimer: Components of this note were documented using voice recognition system and are subject to errors not corrected at proofreading. Contact the author of this note for any clarifications.

## 2025-03-26 NOTE — IMAGING NOTE
Pt arrives to room CT 4 at 14:25. Working with CT Farmeron BOUCHRA Mehta. IV established to right AC with 20 gauge angiocath. Pt denies long acting nitrates. Pt positioned on CT table comfortably. Procedure explained and questions answered. O2 applied via NC at 2 LPM. VSS as noted in flowsheet.     GFR = 100   imaging started at 14:58    0.9NS flush followed by Omnipaque contrast at 15:06    omnipaque contrast = 95 mL  0.9NS = 100 mL  Average HR = 68    Pt tolerated procedure without complication. Denies s/sx of contrast reaction. Pt A/O x 4 and denies pain. Ambulatory and in stable condition. Dc'd back to room 8619 accompanied by Carlo, RN.

## 2025-03-26 NOTE — PROGRESS NOTES
[facility]   part of [facility]     Hospitalist Progress Note     Angelique Mccloud Patient Status:  Observation    1970 MRN [id number]   Location [facility] 8NE-A Attending Jaspreet, Nazario Galindo, *   Hosp Day # 0 PCP Lu Rock MD     Chief Complaint: Chest pain    Subjective:      - Pt no longer having any chest pain   - Does endorse ongoing heart burn after switching from omeprazole to pepcid   - Denies other recent f/c, sob, cough, congestion, rhinorrhea, abd pain    Objective:    Review of Systems:   A comprehensive review of systems was completed; pertinent positive and negatives stated in subjective.    Vital signs:  Temp:  [96.8 °F (36 °C)-98.2 °F (36.8 °C)] 97.4 °F (36.3 °C)  Pulse:  [68-93] 93  Resp:  [14-24] 18  BP: (113-148)/() 121/87  SpO2:  [95 %-100 %] 99 %    Physical Exam:    General: No acute distress  Respiratory: no wheezes, no rhonchi  Cardiovascular: S1, S2, regular rate and rhythm  Abdomen: Soft, Non-tender, non-distended, positive bowel sounds  Neuro: No new focal deficits.   Extremities: no edema    Diagnostic Data:    Labs:  Recent Labs   Lab 25  1536 25  0253   WBC 11.6* 6.8   HGB 15.2 13.3   MCV 88.7 87.4   .0 219.0       Recent Labs   Lab 25  1536 25  0253   GLU 93 86   BUN 12 11   CREATSERUM 0.82 0.71   CA 9.6 8.9   ALB 4.7  --     144   K 3.1* 4.0    110   CO2 27.0 27.0   ALKPHO 95  --    AST 16  --    ALT 13  --    BILT 0.5  --    TP 7.2  --        Estimated Creatinine Clearance: 74.1 mL/min (based on SCr of 0.71 mg/dL).    Recent Labs   Lab 25  2133 25  2347 25  0253   TROPHS <3 <3 <3       No results for input(s): \"PTP\", \"INR\" in the last 168 hours.               Microbiology    No results found for this visit on 25.      Imaging: Reviewed in Epic.    Medications:    metoprolol succinate ER  12.5 mg Oral Daily Beta Blocker    flecainide  50 mg Oral BID    aspirin  81 mg Oral Daily     enoxaparin  0.5 mg/kg Subcutaneous q12h    pantoprazole  40 mg Oral QAM AC       Assessment & Plan:      #Atypical chest pain  #Nausea, pre-syncopal symptoms   -Ddimer negative   -EKG w/out acute ischemic changes  -trend troponin  -telemetry  -echo with preserved EF   -aspirin, statin  -trial PPI  -cardiology consulted from ED; plan for CCTA today      #Hypokalemia     #PAF  -PTA flecainide, metoprolol    #Morbid obesity  -Body mass index is 40.74 kg/m².  -on GLP-1      #Leukocytosis, suspect reactive      Nazario Josie Vogel MD    Supplementary Documentation:     Quality:  DVT Mechanical Prophylaxis:   SCDs,    DVT Pharmacologic Prophylaxis   Medication    enoxaparin (Lovenox) 60 MG/0.6ML SUBQ injection 50 mg      DVT Pharmacologic prophylaxis: Aspirin 162 mg         Code Status: Not on file  Dumont: No urinary catheter in place  Dumont Duration (in days):   Central line:    GRADY: 3/26/2025    The 21st Century Cures Act makes medical notes like these available to patients in the interest of transparency. Please be advised this is a medical document. Medical documents are intended to carry relevant information, facts as evident, and the clinical opinion of the practitioner. The medical note is intended as peer to peer communication and may appear blunt or direct. It is written in medical language and may contain abbreviations or verbiage that are unfamiliar.

## 2025-03-26 NOTE — ED QUICK NOTES
Rounding Completed    Plan of Care reviewed. Waiting for transport.  Elimination needs assessed.  Provided information regarding room assignment - Rm. 1886.    Bed is locked and in lowest position. Call light within reach.

## 2025-03-26 NOTE — ED QUICK NOTES
Rounding Completed    Plan of Care reviewed. Waiting for disposition.  Elimination needs assessed.  Commode at bedside.  Family at bedside.    Bed is locked and in lowest position. Call light within reach.

## 2025-03-26 NOTE — PLAN OF CARE
Pt is A&O x4. Denies SOB & cardiac symptoms.  Maintaining O2 on RA. NSR on tele, S1&S2 present.  Bowel sounds active. Continent of bowel and bladder.  Pt updated on plan of care. Trend troponin. Echo.  Bed in lowest position. Bed alarm on. Call light within reach.     Problem: CARDIOVASCULAR - ADULT  Goal: Absence of cardiac arrhythmias or at baseline  Description: INTERVENTIONS:- Continuous cardiac monitoring, monitor vital signs, obtain 12 lead EKG if indicated- Evaluate effectiveness of antiarrhythmic and heart rate control medications as ordered- Initiate emergency measures for life threatening arrhythmias- Monitor electrolytes and administer replacement therapy as ordered  Outcome: Progressing     Problem: SAFETY ADULT - FALL  Goal: Free from fall injury  Description: INTERVENTIONS:- Assess pt frequently for physical needs- Identify cognitive and physical deficits and behaviors that affect risk of falls.- [place] fall precautions as indicated by assessment.- Educate pt/family on patient safety including physical limitations- Instruct pt to call for assistance with activity based on assessment- Modify environment to reduce risk of injury- Provide assistive devices as appropriate- Consider OT/PT consult to assist with strengthening/mobility- Encourage toileting schedule  Outcome: Progressing

## 2025-03-26 NOTE — HISTORICAL OFFICE NOTE
Facility Logo [facility]  [address], [address] [place] [address]  [phone]      Angelique Mccloud  Progress Note  Demographics:  Name: Angelique Mccloud YOB: 1970  Age: 54, Female Medical Record No: [id number]  Visited Date/Time: 02/28/2024 08:10 AM    Chief Complaints  follow up visit  History of Present Illness  Angelique Mccloud is a 53 year old woman with PMHx of PAF status post ablation with symptoms to suggest recurrence although this has not been documented on telemetry. She is status post LINQ monitor placement, no episodes of AF noted on follow up. LINQ monitor has been removed.    She presented to the ER with palpitations, was in SR, having some ectopy on tele, no AF. We started metoprolol on her last visit which has helped with symptoms of fluttering.     She had low BP, also had an episode of syncope, no recurrent events since her last visit.     Cardiac risk factors Never smoked  Past Medical History  1.History of atrial fibrillation  2.Encounter for monitoring flecainide therapy  Past Surgical History  1.S/P ablation of atrial fibrillation  2.Encounter for loop recorder at end of battery life  Family History  1. Father - No family history of coronary artery disease  Social History  Smoking status Never smoked  Tobacco usage - No (Non-smoker (finding))  Review of systems  Cardiovascular Palpitations  No history of Chest pain, ALLRED, Syncope, PND, Orthopnea, Edema and Claudication  Physical Examination  Vitals Right Arm Sitting  / 68 mmHg, Pulse rate 70 bpm, Regular, Height in 5' 3\", BMI: 39.5, Weight in 223 lbs (or) 101 kgs and BSA : 2.17 cc/m²  General Appearance No Acute Distress  Head/Eyes/Ears/Nose/Mouth/Throat Conjunctiva pink, Sclera Clear and Mucous membranes Moist  Neck Normal carotid pulsations, No carotid bruits and No JVD  Respiratory Unlabored, Lungs clear with normal breath sounds and Equal bilaterally  Cardiovascular Intact distal  pulses and Regular rhythm. Normal rate present. Normal and normal S1 and S2    Allergies  No medication allergies noted.  Medications  1.aspirin 81 MG tablet, 1 tablet daily  2.Cholecalciferol (VITAMIN D) 2000 units capsule, Take 1 capsule by mouth.  3.magnesium oxide (MAG-OX) 400 MG tablet, 1 tablet daily.  4.Zinc-15 66 mg tablet, Take 1 tablet orally once a day.  Impression  1.S/P ablation of atrial fibrillation  2.History of atrial fibrillation  Assessment & Plan  Angelique Mccloud is a 53 year old woman with PMHx of PAF status post ablation. No recurrent AF since the ablation. Had a LINQ, no AF, this was removed.     Palpitations are better on metoprolol but she has had some lower BP and an episode of syncope. No recurrent hypotensive episodes.     - Continue current medications  - Continue metoprolol succinate 12.5 QD.  -Consider ablation for recurrent AF.   - Follow up in 6 months.         Increased BMI: Provide patient with information regarding diet and lifestyle changes.    Increased BMI: Provide patient with information regarding diet and lifestyle changes.  Medications Ordered  1.flecainide 50 mg tablet, Take 1 tablet orally 2 times a day.  2.metoprolol succinate ER 25 mg tablet,extended release 24 hr, Take one-half tablet orally once a day.  Labs and Diagnostics ordered  1.EKG (electrocardiogram) (Today)  Future appointments  1.Referral Visit - Lu Rock ([email or web address]) : (Today)  2.Follow up visit - Natalie Pringle MD (1 Year)  Miscellaneous  1.Weight monitoring (regime/therapy)  Nurses documentation  Refills: none  Upcoming surgeries: none   Use of assisted devices: none  EKG: Done  (AR, LONA)     Patient instructions  Follow up appointment unscheduled:   Your provider has requested for you to follow up in 1 year or sooner if needed.  We will call you to schedule the appointment.  It is always important to bring all your medications including over the counter medications, vitamins and  supplements to your doctor visits. This will assist in providing the best care for your condition. Please bring your insurance cards to your appointment.     Call [facility] if you have any problems or concerns at [phone]   CPOE Orders carried out by: Joelle ORDOÑEZ  Care Providers: Natalie Pringle MD, Kriss FELIPE, Adela Valencia and Joelle ORDOÑEZ  Electronically Authenticated by  Natalie Pringle MD  03/13/2024 08:44:32 AM  Disclaimer: Components of this note were documented using voice recognition system and are subject to errors not corrected at proofreading. Contact the author of this note for any clarifications.

## 2025-03-26 NOTE — H&P
[facility]     Angelique Mccloud Patient Status:  Emergency    1970 MRN [id number]   Location [facility] EMERGENCY DEPARTMENT Attending Adam Pacheco MD   Hosp Day # 0 PCP Lu Rock MD     Chief Complaint: chest pain    Subjective:    History of Present Illness:     Angelique Mccloud is a 55 year old female with pmhx of afib, ventricular tachycardia, anxiety, KARMA, morbid obesity who presents with complaint of chest discomfort and nausea. She reports she was here charting in the hospital when she had episode of chest burning and nausea associated with some lightheadedness and heart racing. Burning sensation started in epigastric region and radiated up to her chest. Chest discomfort has been ongoing and constant for several hours.  Her BP was checked on the floor and SBP was noted to be in 150's which she reports is very unusual for her. Her HR was also checked and thought to be in 120's, so she came down to ED for further evaluation. She denies any recent exertional symptoms, SOB/ALLRED, abdominal pain, vomiting/diarrhea, recent sick contacts, leg swelling, fevers/chills, URI/flu-like symptoms. She was previously on omeprazole for reflux, but was changed to pepcid.     History/Other:    Past Medical History:  Past Medical History:    A-fib ([facility])    Anxiety    Constipation    Heart palpitations    Morbid obesity with BMI of 40.0-44.9, adult (HCC)    Obesity, unspecified    KARMA (obstructive sleep apnea)    AHI 5 REM AHI 10 Supine AHI 29 non-supine AHI 1 Sao2 Theodore 90%     Pain in joint, shoulder region    right    Palpitations    Presence of other cardiac implants and grafts    Stress    Ventricular tachycardia ([facility])    Wears glasses     Past Surgical History:   Past Surgical History:   Procedure Laterality Date          x2    Colonoscopy        Family History:   Family History   Problem Relation Age of Onset    Cancer Father         colon?    Diabetes Father      Crohn's Disease Father     Hypertension Father     Ulcerative Colitis Mother     Ulcerative Colitis Son     Cancer Paternal Grandmother         mandibular cancer    Diabetes Sister     Heart Attack Sister      Social History:    reports that she has never smoked. She has never used smokeless tobacco. She reports that she does not currently use alcohol. She reports that she does not use drugs.     Allergies: Allergies[1]    Medications:  Medications Ordered Prior to Encounter[2]    Review of Systems:   A comprehensive review of systems was completed.    Pertinent positives and negatives noted in the HPI.    Objective:   Physical Exam:    BP (!) 115/99   Pulse 75   Temp 98.2 °F (36.8 °C) (Oral)   Resp 16   Ht 5' 3\" (1.6 m)   Wt 230 lb (104.3 kg)   LMP 03/19/2013   SpO2 99%   BMI 40.74 kg/m²   General: No acute distress, Alert  Respiratory: No rhonchi, no wheezes  Cardiovascular: S1, S2. Regular rate and rhythm  Abdomen: Soft, Non-tender, non-distended, positive bowel sounds  Neuro: No new focal deficits  Extremities: No edema    Results:    Labs:      Labs Last 24 Hours:    Recent Labs   Lab 03/25/25  1536   RBC 4.97   HGB 15.2   HCT 44.1   MCV 88.7   MCH 30.6   MCHC 34.5   RDW 12.7   NEPRELIM 6.94   WBC 11.6*   .0       Recent Labs   Lab 03/25/25  1536   GLU 93   BUN 12   CREATSERUM 0.82   EGFRCR 84   CA 9.6   ALB 4.7      K 3.1*      CO2 27.0   ALKPHO 95   AST 16   ALT 13   BILT 0.5   TP 7.2       Estimated Glomerular Filtration Rate: 84 mL/min/1.73m2 (result from lab).    Lab Results   Component Value Date    INR 0.94 03/13/2025       Recent Labs   Lab 03/25/25  1536   TROPHS <3       No results for input(s): \"TROP\", \"PBNP\" in the last 168 hours.    No results for input(s): \"PCT\" in the last 168 hours.    Imaging: Imaging data reviewed in Epic.    Assessment & Plan:      #Atypical chest pain  #Nausea, pre-syncopal symptoms   -Ddimer negative   -EKG w/out acute ischemic changes  -trend  troponin  -telemetry  -echo ordered  -aspirin, statin  -trial PPI  -cardiology consulted from ED    #Hypokalemia  -replace     #PAF  -PTA flecainide, metoprolol    #Morbid obesity  -Body mass index is 40.74 kg/m².  -on GLP-1     #Leukocytosis, suspect reactive  -IVF  -repeat CBC in am             Plan of care discussed with pt, ED     Celina Hernandez DO    Supplementary Documentation:     The 21st Century Cures Act makes medical notes like these available to patients in the interest of transparency. Please be advised this is a medical document. Medical documents are intended to carry relevant information, facts as evident, and the clinical opinion of the practitioner. The medical note is intended as peer to peer communication and may appear blunt or direct. It is written in medical language and may contain abbreviations or verbiage that are unfamiliar.                                       [1] No Known Allergies  [2]   Current Facility-Administered Medications on File Prior to Encounter   Medication Dose Route Frequency Provider Last Rate Last Admin    [COMPLETED] iopamidol 76% (ISOVUE-370) injection for power injector  100 mL Intravenous ONCE PRN Alfie Huggins, DO   100 mL at 03/13/25 1044     Current Outpatient Medications on File Prior to Encounter   Medication Sig Dispense Refill    famotidine (PEPCID) 20 MG Oral Tab Take 1 tablet (20 mg total) by mouth daily.      Tirzepatide-Weight Management (ZEPBOUND) 2.5 MG/0.5ML Subcutaneous Solution Auto-injector Inject 2.5 mg into the skin once a week. 3 mL 2    metoprolol succinate ER 25 MG Oral Tablet 24 Hr Take 0.5 tablets (12.5 mg total) by mouth daily.      Zinc 50 MG Oral Cap Take by mouth.      Cholecalciferol (VITAMIN D) 125 MCG (5000 UT) Oral Cap Take 1 capsule (5,000 Units total) by mouth daily.      Flecainide Acetate 50 MG Oral Tab Take 1 tablet (50 mg total) by mouth 2 (two) times daily.      aspirin 81 MG Oral Chew Tab Chew 1 tablet (81 mg total)  by mouth daily.      Magnesium Oxide 400 (240 MG) MG Oral Tab Take 1 tablet (400 mg total) by mouth daily.  3    Omeprazole 20 MG Oral Tab EC Take by mouth. (Patient not taking: Reported on 3/25/2025)      Bacillus Coagulans-Inulin (PROBIOTIC) 1-250 BILLION-MG Oral Cap Take 1 capsule by mouth daily.

## 2025-03-27 VITALS
HEART RATE: 77 BPM | WEIGHT: 219.56 LBS | RESPIRATION RATE: 19 BRPM | OXYGEN SATURATION: 99 % | BODY MASS INDEX: 38.9 KG/M2 | DIASTOLIC BLOOD PRESSURE: 82 MMHG | HEIGHT: 63 IN | TEMPERATURE: 98 F | SYSTOLIC BLOOD PRESSURE: 118 MMHG

## 2025-03-27 PROCEDURE — 99239 HOSP IP/OBS DSCHRG MGMT >30: CPT | Performed by: STUDENT IN AN ORGANIZED HEALTH CARE EDUCATION/TRAINING PROGRAM

## 2025-03-27 RX ORDER — PANTOPRAZOLE SODIUM 40 MG/1
40 TABLET, DELAYED RELEASE ORAL
Qty: 30 TABLET | Refills: 0 | Status: SHIPPED | OUTPATIENT
Start: 2025-03-28 | End: 2025-04-27

## 2025-03-27 NOTE — PLAN OF CARE
Assumed patient care approximately 1930. Patient is alert and oriented X4, family present during rounds, patient states she feels lethargic and lightheaded following her testing, comfort and medication offered patient declined and stated she will attempt to get some rest. SpO2 maintained on room air with saturation maintaining greater than 89%.. NSR on tele, heart rate controlled. Continent of bladder and bowel. Skin intact. No pain reported. Ambulates  standby assist. Education provided on medication, call light, and plan of care, patient and family verbalize understanding. Patient declining Sub Q Lovenox, educated on importance of medical adherence, patient and family provided understanding but declined at this time.     Plan of care: Tele, SpO2, labs, blood pressure monitoring      Problem: CARDIOVASCULAR - ADULT  Goal: Absence of cardiac arrhythmias or at baseline  Description: INTERVENTIONS:- Continuous cardiac monitoring, monitor vital signs, obtain 12 lead EKG if indicated- Evaluate effectiveness of antiarrhythmic and heart rate control medications as ordered- Initiate emergency measures for life threatening arrhythmias- Monitor electrolytes and administer replacement therapy as ordered  Outcome: Progressing     Problem: SAFETY ADULT - FALL  Goal: Free from fall injury  Description: INTERVENTIONS:- Assess pt frequently for physical needs- Identify cognitive and physical deficits and behaviors that affect risk of falls.- [place] fall precautions as indicated by assessment.- Educate pt/family on patient safety including physical limitations- Instruct pt to call for assistance with activity based on assessment- Modify environment to reduce risk of injury- Provide assistive devices as appropriate- Consider OT/PT consult to assist with strengthening/mobility- Encourage toileting schedule  Outcome: Progressing     Problem: Patient/Family Goals  Goal: Patient/Family Long Term Goal  Description: Patient's Long Term  Goal: To go home    Interventions:  - MD rounding, medication management, monitor labs, gated CTA, monitor dizziness  - See additional Care Plan goals for specific interventions  Outcome: Progressing  Goal: Patient/Family Short Term Goal  Description: Patient's Short Term Goal: Remain pain free    Interventions:   - Pain assessments q4, pain meds PRN  - See additional Care Plan goals for specific interventions  Outcome: Progressing

## 2025-03-27 NOTE — PLAN OF CARE
Pt's testing came back positive for chlamydia.  Gonorrhea was negative.    She will need to take azithromycin 1 gram single dose to treat this sexually transmitted infection.  She should notify all of her recent partners of this diagnosis so that they can be treated appropriately for exposure to this infection.  She should abstain from sexual contact with her current partner(s) until one week after both she and partner(s) have been treated for the infection.  Condom use will reduce her risk of re-infection with this and other sexually-transmitted infections.    Rx for azithromycin is in Epic.  This can be called to the pharmacy of the patient's choice.   Rec'd pt at 0730. A&O x 4, eager to go home. Tele shows NSR. O2 sats adequate on RA. Pt continent, up w/ SBA. Orthos (-) this AM done by overnight staff, no c/o dizziness today w/ ambulation or at rest. Ambulating in halls this morning w/ daughter. No C/O pain or SOB. Skin dry and intact. Bed locked and in low position, call light and personal items within reach. Will continue to monitor. POC - Orthos q shift, await hospitalist for clearance to discharge home today.    Problem: CARDIOVASCULAR - ADULT  Goal: Absence of cardiac arrhythmias or at baseline  Description: INTERVENTIONS:- Continuous cardiac monitoring, monitor vital signs, obtain 12 lead EKG if indicated- Evaluate effectiveness of antiarrhythmic and heart rate control medications as ordered- Initiate emergency measures for life threatening arrhythmias- Monitor electrolytes and administer replacement therapy as ordered  Outcome: Progressing     Problem: SAFETY ADULT - FALL  Goal: Free from fall injury  Description: INTERVENTIONS:- Assess pt frequently for physical needs- Identify cognitive and physical deficits and behaviors that affect risk of falls.- [place] fall precautions as indicated by assessment.- Educate pt/family on patient safety including physical limitations- Instruct pt to call for assistance with activity based on assessment- Modify environment to reduce risk of injury- Provide assistive devices as appropriate- Consider OT/PT consult to assist with strengthening/mobility- Encourage toileting schedule  Outcome: Progressing     Problem: Patient/Family Goals  Goal: Patient/Family Long Term Goal  Description: Patient's Long Term Goal: To go home    Interventions:  - MD rounding, medication management, monitor labs, gated CTA, monitor dizziness  - See additional Care Plan goals for specific interventions  Outcome: Progressing  Goal: Patient/Family Short Term Goal  Description: Patient's Short Term Goal: Remain pain free    Interventions:   -  Pain assessments q4, pain meds PRN  - See additional Care Plan goals for specific interventions  Outcome: Progressing

## 2025-03-27 NOTE — PROGRESS NOTES
[facility]   part of [facility]     Hospitalist Progress Note     Angelique Mccloud Patient Status:  Observation    1970 MRN [id number]   Location [facility] 8NE-A Attending Jaspreet, Nazario Galindo, *   Hosp Day # 0 PCP Lu Rock MD     Chief Complaint: Chest pain    Subjective:      - Pt no longer having chest pain, dizziness or lightheadedness while ambulating    - Denies other acute complaints     Objective:    Review of Systems:   A comprehensive review of systems was completed; pertinent positive and negatives stated in subjective.    Vital signs:  Temp:  [97.1 °F (36.2 °C)-97.9 °F (36.6 °C)] 97.5 °F (36.4 °C)  Pulse:  [56-86] 74  Resp:  [18-20] 19  BP: ()/(52-89) 106/52  SpO2:  [93 %-99 %] 99 %    Physical Exam:    General: No acute distress  Respiratory: no wheezes, no rhonchi  Cardiovascular: S1, S2, regular rate and rhythm  Abdomen: Soft, Non-tender, non-distended, positive bowel sounds  Neuro: No new focal deficits.   Extremities: no edema    Diagnostic Data:    Labs:  Recent Labs   Lab 25  1536 25  0253   WBC 11.6* 6.8   HGB 15.2 13.3   MCV 88.7 87.4   .0 219.0       Recent Labs   Lab 25  1536 25  0253   GLU 93 86   BUN 12 11   CREATSERUM 0.82 0.71   CA 9.6 8.9   ALB 4.7  --     144   K 3.1* 4.0    110   CO2 27.0 27.0   ALKPHO 95  --    AST 16  --    ALT 13  --    BILT 0.5  --    TP 7.2  --        Estimated Creatinine Clearance: 74.1 mL/min (based on SCr of 0.71 mg/dL).    Recent Labs   Lab 25  2133 25  2347 25  0253   TROPHS <3 <3 <3       No results for input(s): \"PTP\", \"INR\" in the last 168 hours.               Microbiology    No results found for this visit on 25.      Imaging: Reviewed in Epic.    Medications:    flecainide  50 mg Oral BID    metoprolol succinate ER  12.5 mg Oral Daily Beta Blocker    metoprolol  5 mg Intravenous See Admin Instructions    metoprolol tartrate  50 mg Oral Once    Or     metoprolol tartrate  100 mg Oral Once    metoprolol tartrate  50 mg Oral Once    Or    metoprolol tartrate  100 mg Oral Once    aspirin  81 mg Oral Daily    enoxaparin  0.5 mg/kg Subcutaneous q12h    pantoprazole  40 mg Oral QAM AC       Assessment & Plan:      #Atypical chest pain  #Nausea, pre-syncopal symptoms   -Ddimer negative   -EKG w/out acute ischemic changes  -troponins negative  -telemetry  -echo with preserved EF   -aspirin, statin  -trial PPI  -cardiology consulted from ED; CCTA with normal coronary arteries  -Suspect etiology of pain 2/2 anxiety vs. GERD; d/w pt on starting her lexapro which she has already previously been prescribed and f/u with PCP for referral to psychiatry as well as re-establishing care with GI for management of GERD given lack of improvement with pepcid.        #Hypokalemia     #PAF  -PTA flecainide, metoprolol    #Morbid obesity  -Body mass index is 40.74 kg/m².  -on GLP-1      #Leukocytosis, suspect reactive    DC today       Nazaroi Vogel MD    Supplementary Documentation:     Quality:  DVT Mechanical Prophylaxis:   SCDs,    DVT Pharmacologic Prophylaxis   Medication    enoxaparin (Lovenox) 60 MG/0.6ML SUBQ injection 50 mg      DVT Pharmacologic prophylaxis: Aspirin 162 mg         Code Status: Not on file  Dumont: No urinary catheter in place  Dumont Duration (in days):   Central line:    GRADY: 3/27/2025    The 21st Century Cures Act makes medical notes like these available to patients in the interest of transparency. Please be advised this is a medical document. Medical documents are intended to carry relevant information, facts as evident, and the clinical opinion of the practitioner. The medical note is intended as peer to peer communication and may appear blunt or direct. It is written in medical language and may contain abbreviations or verbiage that are unfamiliar.

## 2025-03-27 NOTE — PROGRESS NOTES
03/27/25 0520 03/27/25 0522 03/27/25 0524   Vital Signs   Temp 97.1 °F (36.2 °C)  --   --    Temp src Axillary  --   --    Pulse 63 74 86   Heart Rate Source Monitor Monitor Monitor   Resp 19  --   --    Respiratory Quality Normal Normal Normal   BP 97/60 103/79 100/83   MAP (mmHg) 73 85 91   BP Location Left arm Left arm Left arm   BP Method Automatic Automatic Automatic   Patient Position Lying Sitting Standing     3/27/2025 Orthostatic blood pressure

## 2025-03-28 ENCOUNTER — PATIENT OUTREACH (OUTPATIENT)
Dept: CASE MANAGEMENT | Age: 55
End: 2025-03-28

## 2025-03-28 NOTE — PROGRESS NOTES
Transitional Care Management   Discharge Date: 3/27/25  Contact Date: 3/28/2025    Assessment:  TCM Initial Assessment    General:  Assessment completed with: Patient  Patient Subjective: Pt feeling a little better however she does still have some lightheadedness and headache.  Chief Complaint: Syncope  Verify patient name and  with patient/ caregiver: Yes    Hospital Stay/Discharge:  Prior to leaving the hospital were your Discharge Instructions reviewed with you?: Yes  Did you receive a copy of your written Discharge Instructions?: Yes  Do you feel better or worse since you left the hospital or emergency department?: Better    Follow - Up Appointment:  Do you have a follow-up appointment?: No  Are there any barriers to getting to your follow-up appointment?: No    Home Health/DME:  Prior to leaving the hospital was Home Health (HH) arranged for you?: No     Prior to leaving the hospital or emergency department was Durable Medical Equipment (DME), medical supplies, or infusions arranged for you?: No  Are DME/medical supply/infusions needs identified by staff during this assessment?: No     Medications/Diet:       Were you given a different diet per your Discharge Instructions?: No     Questions/Concerns:  Do you have any questions or concerns that have not been discussed?: No         Follow-up Appointments:  Your appointments       Date & Time Appointment Department (Center)    May 20, 2025 9:15 AM CDT Follow Up Non Surgical with Chidi Dwyer MD [facility] ([facility])    Please arrive 15 minutes prior to your scheduled appointment. Be sure to bring your current Insurance card, photo ID and a list of your current medications.     Please verify with your Primary Care Provider if your insurance requires a referral.     A 24 hour notice is required to cancel any appointment or you may be charged a $40 No Show Fee               [facility],  [place] [address] [address] [address]  [phone]            Transitional Care Clinic  Was TCC Ordered: No  Was TCC Scheduled: No, Explain scheduled with PCP office.     Primary Care Provider (If no TCC appointment)  Does patient already have a PCP appointment scheduled? No  Care Manager Scheduled PCP office TCM appointment with patient    Specialist  Does the patient have any other follow-up appointment(s) that need to be scheduled? Yes   -If yes: Care Manager reviewed upcoming specialist appointments with patient: Yes   -Does the patient need assistance scheduling appointment(s): No      Book By Date: 4/10/25

## 2025-04-02 NOTE — DISCHARGE SUMMARY
[facility] HOSPITALIST  DISCHARGE SUMMARY     Angelique Mccloud Patient Status:  Observation    1970 MRN [id number]   Location [facility] 8NE-A Attending No att. providers found   Hosp Day # 0 PCP Lu Rock MD     Date of Admission: 3/25/2025  Date of Discharge: 3/27/2025  Discharge Disposition: Home or Self Care    Admitting Diagnosis:   Syncope, near [R55]  Chest pain of uncertain etiology [R07.9]    Hospital Discharge Diagnoses:   Atypical chest pain, suspected secondary to GERD  Hypokalemia  Paroxysmal A-fib  Morbid obesity, BMI 40  Leukocytosis suspect reactive    Lace+ Score: 37  59-90 High Risk  29-58 Medium Risk  0-28   Low Risk.    TCM Follow-Up Recommendation:  LACE 29-58: Moderate Risk of readmission after discharge from the hospital.        Discharge Diagnosis:   Atypical chest pain, suspected secondary to GERD    History of Present Illness: Angelique Mccloud is a 55 year old female with pmhx of afib, ventricular tachycardia, anxiety, KARMA, morbid obesity who presents with complaint of chest discomfort and nausea. She reports she was here charting in the hospital when she had episode of chest burning and nausea associated with some lightheadedness and heart racing. Burning sensation started in epigastric region and radiated up to her chest. Chest discomfort has been ongoing and constant for several hours.  Her BP was checked on the floor and SBP was noted to be in 150's which she reports is very unusual for her. Her HR was also checked and thought to be in 120's, so she came down to ED for further evaluation. She denies any recent exertional symptoms, SOB/ALLRED, abdominal pain, vomiting/diarrhea, recent sick contacts, leg swelling, fevers/chills, URI/flu-like symptoms. She was previously on omeprazole for reflux, but was changed to pepcid.     Brief Synopsis: Patient presented with chest pain, suspected atypical in nature and history.  Ischemic workup completed with EKG without ischemic  changes, troponins negative, TTE with preserved EF, cardiology consulted and patient underwent CCTA with normal coronary arteries.  D-dimer negative.  Suspected etiology of pain secondary to anxiety versus GERD, patient to start Lexapro which she has been previously prescribed by her PCP as well as referral to psychiatry.  Recommended for outpatient follow-up with GI for management of GERD and resumption of PPI at discharge.. All diagnoses discussed with patient, they demonstrated understanding and plan of care and risks reviewed and in agreement.     Procedures during hospitalization:       Incidental or significant findings and recommendations (brief descriptions):  Follow-up with PCP for management of anxiety, patient notes she will start taking Lexapro which she has been previously prescribed by PCP  Follow-up with GI for management of GERD, restarted on pantoprazole discharge    Lab/Test results pending at Discharge:       Consultants:  Cardiology    Discharge Medication List:     Discharge Medications        START taking these medications        Instructions Prescription details   pantoprazole 40 MG Tbec  Commonly known as: Protonix      Take 1 tablet (40 mg total) by mouth every morning before breakfast.   Stop taking on: April 27, 2025  Quantity: 30 tablet  Refills: 0            CONTINUE taking these medications        Instructions Prescription details   aspirin 81 MG Chew      Chew 1 tablet (81 mg total) by mouth daily.   Refills: 0     flecainide 50 MG Tabs  Commonly known as: Tambocor      Take 1 tablet (50 mg total) by mouth 2 (two) times daily.   Refills: 0     Magnesium Oxide -Mg Supplement 400 (240 Mg) MG Tabs      Take 1 tablet (400 mg total) by mouth daily.   Refills: 3     metoprolol succinate ER 25 MG Tb24  Commonly known as: Toprol XL      Take 0.5 tablets (12.5 mg total) by mouth daily.   Refills: 0     Probiotic 1-250 BILLION-MG Caps      Take 1 capsule by mouth daily.   Refills: 0     Vitamin  D 125 MCG (5000 UT) Caps      Take 1 capsule (5,000 Units total) by mouth daily.   Refills: 0     Zepbound 2.5 MG/0.5ML Soaj  Generic drug: Tirzepatide-Weight Management  Notes to patient: Resume home schedule      Inject 2.5 mg into the skin once a week.   Quantity: 3 mL  Refills: 2     Zinc 50 MG Caps      Take 1 capsule by mouth once daily.   Refills: 0            STOP taking these medications      Pepcid 20 MG Tabs  Generic drug: famotidine                  Where to Get Your Medications        These medications were sent to [facility]/pharmacy #[id number] - [place], [place] - [address] [phone], [phone]  [address] [address]      Phone: [phone]   pantoprazole 40 MG [facility] reviewed: Yes    Follow-up appointment:   Natalie Pringle MD  [address]  [address]  [facility] [id number] [phone]    Follow up in 2 week(s)  Office will call to schedule    Hali Reynolds MD  [address]   [facility] [address] [phone]    Call in 1 week(s)  For follow-up after hospitilization    Lu Rock MD  [address]  [facility] [id number] [phone]    Call in 1 week(s)  For follow-up after hospitilization      Vital signs:       Physical Exam:  See exam from day of discharge note  -----------------------------------------------------------------------------------------------  PATIENT DISCHARGE INSTRUCTIONS: See electronic chart    Nazario Vogel MD 4/2/2025    Time spent:  35 minutes

## 2025-04-23 ENCOUNTER — TELEPHONE (OUTPATIENT)
Dept: INTERNAL MEDICINE CLINIC | Facility: CLINIC | Age: 55
End: 2025-04-23

## 2025-04-23 RX ORDER — PANTOPRAZOLE SODIUM 40 MG/1
40 TABLET, DELAYED RELEASE ORAL
Qty: 90 TABLET | Refills: 1 | Status: SHIPPED | OUTPATIENT
Start: 2025-04-23

## 2025-04-23 NOTE — TELEPHONE ENCOUNTER
Pt says she spoke w/ [facility] who does not have her script for 'pantoprazole 40mg'. I spoke w/ SM who does recall speaking w/ pt about this and will place once able to.    pantoprazole 40 MG Oral Tab EC     [facility]/PHARMACY #[id number] - [place], [place] - [address] [[email or web address]] [phone], [phone] [[id number]]

## 2025-05-06 DIAGNOSIS — G47.33 OSA (OBSTRUCTIVE SLEEP APNEA): ICD-10-CM

## 2025-05-06 DIAGNOSIS — E66.01 MORBID OBESITY WITH BMI OF 40.0-44.9, ADULT (HCC): ICD-10-CM

## 2025-05-07 RX ORDER — TIRZEPATIDE 2.5 MG/.5ML
2.5 INJECTION, SOLUTION SUBCUTANEOUS WEEKLY
Qty: 3 ML | Refills: 2 | Status: SHIPPED | OUTPATIENT
Start: 2025-05-07

## 2025-05-20 ENCOUNTER — OFFICE VISIT (OUTPATIENT)
Dept: SURGERY | Facility: CLINIC | Age: 55
End: 2025-05-20
Payer: COMMERCIAL

## 2025-05-20 VITALS
SYSTOLIC BLOOD PRESSURE: 120 MMHG | WEIGHT: 211 LBS | HEIGHT: 63 IN | BODY MASS INDEX: 37.39 KG/M2 | DIASTOLIC BLOOD PRESSURE: 80 MMHG | HEART RATE: 62 BPM | OXYGEN SATURATION: 98 %

## 2025-05-20 DIAGNOSIS — F43.9 STRESS: ICD-10-CM

## 2025-05-20 DIAGNOSIS — E66.9 OBESITY (BMI 30-39.9): ICD-10-CM

## 2025-05-20 DIAGNOSIS — G47.33 OSA (OBSTRUCTIVE SLEEP APNEA): ICD-10-CM

## 2025-05-20 DIAGNOSIS — Z51.81 ENCOUNTER FOR THERAPEUTIC DRUG MONITORING: ICD-10-CM

## 2025-05-20 DIAGNOSIS — E78.5 DYSLIPIDEMIA: Primary | ICD-10-CM

## 2025-05-20 PROCEDURE — 99214 OFFICE O/P EST MOD 30 MIN: CPT | Performed by: INTERNAL MEDICINE

## 2025-05-20 RX ORDER — TIRZEPATIDE 5 MG/.5ML
5 INJECTION, SOLUTION SUBCUTANEOUS WEEKLY
Qty: 3 ML | Refills: 5 | Status: SHIPPED | OUTPATIENT
Start: 2025-05-20 | End: 2025-05-22

## 2025-05-20 NOTE — PROGRESS NOTES
[facility]  [address] [facility] [address] [place] [id number]  Dept: [phone]       Patient:  Angelique Mccloud  :      1970  MRN:      [id number]    Chief Complaint:    Chief Complaint   Patient presents with    Follow - Up    Weight Management       SUBJECTIVE     History of Present Illness:  Angelique is being seen today for a follow-up for non surgical weight loss    Past Medical History: Past Medical History[1]     Comorbidities:  Back pain-Improvement?  yes, Joint pain-Improvement?  yes, Hyperlipidemia-Improvement?  yes, Snoring-Improvement?  yes, and Sleep apnea-Improvement?  yes    OBJECTIVE     Vitals: /80 (BP Location: Right arm, Patient Position: Sitting, Cuff Size: large)   Pulse 62   Ht 5' 3\" (1.6 m)   Wt 211 lb (95.7 kg)   LMP 2013   SpO2 98%   BMI 37.38 kg/m²     Initial weight loss: -16   Total weight loss: -16    Start weight: 227    Wt Readings from Last 3 Encounters:   25 211 lb (95.7 kg)   25 215 lb (97.5 kg)   04/15/25 215 lb (97.5 kg)       Patient Medications:  Current Medications[2]  Allergies:  Patient has no known allergies.     Social History:    Social History     Socioeconomic History    Marital status:      Spouse name: Not on file    Number of children: Not on file    Years of education: Not on file    Highest education level: Not on file   Occupational History    Not on file   Tobacco Use    Smoking status: Never    Smokeless tobacco: Never   Vaping Use    Vaping status: Never Used   Substance and Sexual Activity    Alcohol use: Not Currently     Alcohol/week: 0.0 standard drinks of alcohol     Comment: rarely    Drug use: No    Sexual activity: Yes     Partners: Male     Birth control/protection: Condom     Comment: ablation    Other Topics Concern     Service Not Asked    Blood Transfusions Not Asked    Caffeine Concern No    Occupational Exposure Not Asked    Hobby  Hazards Not Asked    Sleep Concern Not Asked    Stress Concern Yes     Comment: pretty high daily    Weight Concern Not Asked    Special Diet No    Back Care Not Asked    Exercise No    Bike Helmet Not Asked    Seat Belt Not Asked    Self-Exams Not Asked   Social History Narrative    Not on file     Social Drivers of Health     Food Insecurity: No Food Insecurity (3/25/2025)    NCSS - Food Insecurity     Worried About Running Out of Food in the Last Year: No     Ran Out of Food in the Last Year: No   Transportation Needs: No Transportation Needs (3/25/2025)    NCSS - Transportation     Lack of Transportation: No   Stress: Not on file   Housing Stability: Not At Risk (3/25/2025)    NCSS - Housing/Utilities     Has Housing: Yes     Worried About Losing Housing: No     Unable to Get Utilities: No     Surgical History:  Past Surgical History[3]  Family History:  Family History[4]    Food Journal  Reviewed and Discussed:       Patient has a Food Journal?: yes   Patient is reading nutrition labels?  yes  Average Caloric Intake:     Average CHO Intake: 120  Is patient exercising? yes  Type of exercise? ADLs    Eating Habits  Patient states the following:  Eats 2 meal(s) per day  Length of time it takes to consume a meal:  20  # of snacks per day: 1 Type of snacks:  fruit  Amount of soda consumption per day:  diet  Amount of water (in ounces) per day:  64  Drinking between meals only:  yes  Toughest challenge:  stress    Nutritional Goals  Limit carbohydrates to 100 gms per day, Eat 100-200 calories within 1 hour of waking , and Eat 3-4 cups of fresh fruits or vegetables daily    Behavior Modifications Reviewed and Discussed  Eat breakfast, Eat 3 meals per day, Plan meals in advance, Read nutrition labels, Drink 64 oz of water per day, Maintain a daily food journal, No drinking 30 minutes before or after meals, Utlize portion control strategies to reduce calorie intake, Identify triggers for eating and manage cues, and Eat  slowly and take 20 to 30 minutes to complete each meal    Exercise Goals Reviewed and Discussed    Increase as tolerated    ROS:    Constitutional: negative  Respiratory: negative  Cardiovascular: negative  Gastrointestinal: positive for reflux symptoms  Musculoskeletal:negative  Neurological: negative  Behavioral/Psych: positive for stress  Endocrine: negative  All other systems were reviewed and are negative    Physical Exam:   General appearance: alert, appears stated age, and cooperative  Head: Normocephalic, without obvious abnormality, atraumatic  Back: symmetric, no curvature. ROM normal. No CVA tenderness.  Lungs: clear to auscultation bilaterally  Heart: S1, S2 normal, no murmur, click, rub or gallop, regular rate and rhythm  Abdomen: soft, non-tender; bowel sounds normal; no masses,  no organomegaly  Extremities: extremities normal, atraumatic, no cyanosis or edema  Pulses: 2+ and symmetric  Skin: Skin color, texture, turgor normal. No rashes or lesions  Neurologic: Grossly normal    ASSESSMENT     HYPERCHOLESTEROLEMIA:  The patient states that her cholesterol has been well controlled on her current diet    Lab Results   Component Value Date/Time    CHOLEST 187 02/13/2025 08:45 AM    LDL 99 02/13/2025 08:45 AM    HDL 78 (H) 02/13/2025 08:45 AM    TRIG 50 02/13/2025 08:45 AM    VLDL 8 02/13/2025 08:45 AM    TCHDLRATIO 1.74 02/23/2015 09:30 AM       Encounter Diagnosis(ses):   Encounter Diagnoses   Name Primary?    Dyslipidemia Yes    KARMA (obstructive sleep apnea)     Stress     Encounter for therapeutic drug monitoring     Obesity (BMI 30-39.9)        PLAN     Patient is not interested in bariatric surgery. Patient desires to pursue traditional weight loss at this time.      Stress: improved     Goals for next month:  1. Keep a food log.  2. Drink 48-64 ounces of non-caloric beverages per day. No fruit juices or regular soda.  3. Increase activity-upper body exercises, walk 10 minutes per day.  4. Increase  fruit and vegetable servings to 5-6 per day.      Recommend a therapist  Lexapro: tolerating well      Zepbound: tolerating well  Increase dose to 5 mg      Diagnoses and all orders for this visit:    Dyslipidemia    KARMA (obstructive sleep apnea)    Stress  -     OP REFERRAL TO [facility]    Encounter for therapeutic drug monitoring    Obesity (BMI 30-39.9)  -     Tirzepatide-Weight Management (ZEPBOUND) 5 MG/0.5ML Subcutaneous Solution Auto-injector; Inject 5 mg into the skin once a week.          Chidi Dwyer MD       [1]   Past Medical History:   A-fib ([facility])    Anxiety    Arthritis    Chronic atrial fibrillation (HCC)    Constipation    Heart palpitations    Migraines    Morbid obesity with BMI of 40.0-44.9, adult ([facility])    Obesity, unspecified    KARMA (obstructive sleep apnea)    AHI 5 REM AHI 10 Supine AHI 29 non-supine AHI 1 Sao2 Theodore 90%     Pain in joint, shoulder region    right    Palpitations    Presence of other cardiac implants and grafts    Stress    Ventricular tachycardia (HCC)    Wears glasses   [2]   Current Outpatient Medications   Medication Sig Dispense Refill    Tirzepatide-Weight Management (ZEPBOUND) 5 MG/0.5ML Subcutaneous Solution Auto-injector Inject 5 mg into the skin once a week. 3 mL 5    pantoprazole 40 MG Oral Tab EC Take 1 tablet (40 mg total) by mouth every morning before breakfast. 90 tablet 1    escitalopram (LEXAPRO) 5 MG Oral Tab Take 1 tablet (5 mg total) by mouth daily. 90 tablet 0    metoprolol succinate ER 25 MG Oral Tablet 24 Hr Take 0.5 tablets (12.5 mg total) by mouth daily.      Zinc 50 MG Oral Cap Take 1 capsule by mouth once daily.      Cholecalciferol (VITAMIN D) 125 MCG (5000 UT) Oral Cap Take 1 capsule (5,000 Units total) by mouth daily.      Bacillus Coagulans-Inulin (PROBIOTIC) 1-250 BILLION-MG Oral Cap Take 1 capsule by mouth daily.      Flecainide Acetate 50 MG Oral Tab Take 1 tablet (50 mg total) by mouth 2 (two) times daily.      aspirin 81 MG Oral Chew Tab Chew  1 tablet (81 mg total) by mouth daily.      Magnesium Oxide 400 (240 MG) MG Oral Tab Take 1 tablet (400 mg total) by mouth daily.  3   [3]   Past Surgical History:  Procedure Laterality Date          x2    Colonoscopy     [4]   Family History  Problem Relation Age of Onset    Cancer Father         colon?    Diabetes Father     Crohn's Disease Father     Hypertension Father     Ulcerative Colitis Mother     Ulcerative Colitis Son     Cancer Paternal Grandmother         mandibular cancer    Diabetes Sister     Heart Attack Sister

## 2025-07-12 NOTE — LETTER
01/07/22        1 [facility]      Dear Sultana Hopper records indicate that you have outstanding lab work and or testing that was ordered for you and has not yet been completed:      LORENZA DAMON
done

## 2025-07-18 ENCOUNTER — APPOINTMENT (OUTPATIENT)
Dept: GENERAL RADIOLOGY | Facility: HOSPITAL | Age: 55
End: 2025-07-18
Attending: EMERGENCY MEDICINE
Payer: COMMERCIAL

## 2025-07-18 ENCOUNTER — HOSPITAL ENCOUNTER (OUTPATIENT)
Facility: HOSPITAL | Age: 55
Setting detail: OBSERVATION
Discharge: HOME | End: 2025-07-19
Attending: EMERGENCY MEDICINE | Admitting: INTERNAL MEDICINE
Payer: COMMERCIAL

## 2025-07-18 DIAGNOSIS — R07.9 ACUTE CHEST PAIN: Primary | ICD-10-CM

## 2025-07-18 DIAGNOSIS — R00.2 PALPITATIONS: ICD-10-CM

## 2025-07-18 PROBLEM — R73.9 HYPERGLYCEMIA: Status: ACTIVE | Noted: 2025-07-18

## 2025-07-18 LAB
ALBUMIN SERPL-MCNC: 4.5 G/DL (ref 3.2–4.8)
ALBUMIN/GLOB SERPL: 1.7 (ref 1–2)
ALP LIVER SERPL-CCNC: 86 U/L (ref 41–108)
ALT SERPL-CCNC: 12 U/L (ref 10–49)
ANION GAP SERPL CALC-SCNC: 8 MMOL/L (ref 0–18)
AST SERPL-CCNC: 16 U/L (ref ?–34)
ATRIAL RATE: 80 BPM
ATRIAL RATE: 93 BPM
BASOPHILS # BLD AUTO: 0.04 X10(3) UL (ref 0–0.2)
BASOPHILS NFR BLD AUTO: 0.5 %
BILIRUB SERPL-MCNC: 0.5 MG/DL (ref 0.3–1.2)
BUN BLD-MCNC: 7 MG/DL (ref 9–23)
CALCIUM BLD-MCNC: 9.2 MG/DL (ref 8.7–10.6)
CHLORIDE SERPL-SCNC: 106 MMOL/L (ref 98–112)
CO2 SERPL-SCNC: 27 MMOL/L (ref 21–32)
CREAT BLD-MCNC: 1.16 MG/DL (ref 0.55–1.02)
EGFRCR SERPLBLD CKD-EPI 2021: 56 ML/MIN/1.73M2 (ref 60–?)
EOSINOPHIL # BLD AUTO: 0.1 X10(3) UL (ref 0–0.7)
EOSINOPHIL NFR BLD AUTO: 1.2 %
ERYTHROCYTE [DISTWIDTH] IN BLOOD BY AUTOMATED COUNT: 12.3 %
GLOBULIN PLAS-MCNC: 2.7 G/DL (ref 2–3.5)
GLUCOSE BLD-MCNC: 117 MG/DL (ref 70–99)
HCT VFR BLD AUTO: 43.9 % (ref 35–48)
HGB BLD-MCNC: 15.3 G/DL (ref 12–16)
IMM GRANULOCYTES # BLD AUTO: 0.02 X10(3) UL (ref 0–1)
IMM GRANULOCYTES NFR BLD: 0.2 %
LYMPHOCYTES # BLD AUTO: 2.87 X10(3) UL (ref 1–4)
LYMPHOCYTES NFR BLD AUTO: 34.5 %
MAGNESIUM SERPL-MCNC: 2 MG/DL (ref 1.6–2.6)
MCH RBC QN AUTO: 30.7 PG (ref 26–34)
MCHC RBC AUTO-ENTMCNC: 34.9 G/DL (ref 31–37)
MCV RBC AUTO: 88 FL (ref 80–100)
MONOCYTES # BLD AUTO: 0.61 X10(3) UL (ref 0.1–1)
MONOCYTES NFR BLD AUTO: 7.3 %
NEUTROPHILS # BLD AUTO: 4.67 X10 (3) UL (ref 1.5–7.7)
NEUTROPHILS # BLD AUTO: 4.67 X10(3) UL (ref 1.5–7.7)
NEUTROPHILS NFR BLD AUTO: 56.3 %
OSMOLALITY SERPL CALC.SUM OF ELEC: 291 MOSM/KG (ref 275–295)
P AXIS: 59 DEGREES
P AXIS: 69 DEGREES
P-R INTERVAL: 156 MS
P-R INTERVAL: 160 MS
PLATELET # BLD AUTO: 274 10(3)UL (ref 150–450)
POTASSIUM SERPL-SCNC: 3.3 MMOL/L (ref 3.5–5.1)
PROT SERPL-MCNC: 7.2 G/DL (ref 5.7–8.2)
Q-T INTERVAL: 372 MS
Q-T INTERVAL: 374 MS
QRS DURATION: 86 MS
QRS DURATION: 92 MS
QTC CALCULATION (BEZET): 428 MS
QTC CALCULATION (BEZET): 462 MS
R AXIS: 12 DEGREES
R AXIS: 12 DEGREES
RBC # BLD AUTO: 4.99 X10(6)UL (ref 3.8–5.3)
SODIUM SERPL-SCNC: 141 MMOL/L (ref 136–145)
T AXIS: 17 DEGREES
T AXIS: 46 DEGREES
TROPONIN I SERPL HS-MCNC: <3 NG/L (ref ?–34)
TROPONIN I SERPL HS-MCNC: <3 NG/L (ref ?–34)
VENTRICULAR RATE: 79 BPM
VENTRICULAR RATE: 93 BPM
WBC # BLD AUTO: 8.3 X10(3) UL (ref 4–11)

## 2025-07-18 PROCEDURE — 71045 X-RAY EXAM CHEST 1 VIEW: CPT | Performed by: EMERGENCY MEDICINE

## 2025-07-18 PROCEDURE — 99223 1ST HOSP IP/OBS HIGH 75: CPT | Performed by: INTERNAL MEDICINE

## 2025-07-18 RX ORDER — ENOXAPARIN SODIUM 100 MG/ML
40 INJECTION SUBCUTANEOUS DAILY
Status: DISCONTINUED | OUTPATIENT
Start: 2025-07-18 | End: 2025-07-19

## 2025-07-18 RX ORDER — PANTOPRAZOLE SODIUM 40 MG/1
40 TABLET, DELAYED RELEASE ORAL
Status: DISCONTINUED | OUTPATIENT
Start: 2025-07-18 | End: 2025-07-19

## 2025-07-18 RX ORDER — ESCITALOPRAM OXALATE 5 MG/1
5 TABLET ORAL DAILY
Status: DISCONTINUED | OUTPATIENT
Start: 2025-07-18 | End: 2025-07-19

## 2025-07-18 RX ORDER — POTASSIUM CHLORIDE 1500 MG/1
40 TABLET, EXTENDED RELEASE ORAL ONCE
Status: COMPLETED | OUTPATIENT
Start: 2025-07-18 | End: 2025-07-18

## 2025-07-18 RX ORDER — ACETAMINOPHEN 500 MG
500 TABLET ORAL EVERY 4 HOURS PRN
Status: DISCONTINUED | OUTPATIENT
Start: 2025-07-18 | End: 2025-07-18

## 2025-07-18 RX ORDER — ASPIRIN 81 MG/1
81 TABLET, CHEWABLE ORAL DAILY
Status: DISCONTINUED | OUTPATIENT
Start: 2025-07-18 | End: 2025-07-19

## 2025-07-18 RX ORDER — ONDANSETRON 2 MG/ML
4 INJECTION INTRAMUSCULAR; INTRAVENOUS EVERY 6 HOURS PRN
Status: DISCONTINUED | OUTPATIENT
Start: 2025-07-18 | End: 2025-07-19

## 2025-07-18 RX ORDER — MORPHINE SULFATE 4 MG/ML
4 INJECTION, SOLUTION INTRAMUSCULAR; INTRAVENOUS ONCE
Status: DISCONTINUED | OUTPATIENT
Start: 2025-07-18 | End: 2025-07-18

## 2025-07-18 RX ORDER — ACETAMINOPHEN 500 MG
500 TABLET ORAL EVERY 4 HOURS PRN
Status: DISCONTINUED | OUTPATIENT
Start: 2025-07-18 | End: 2025-07-19

## 2025-07-18 RX ORDER — NITROGLYCERIN 0.4 MG/1
0.4 TABLET SUBLINGUAL EVERY 5 MIN PRN
Status: DISCONTINUED | OUTPATIENT
Start: 2025-07-18 | End: 2025-07-19

## 2025-07-18 RX ORDER — FLECAINIDE ACETATE 50 MG/1
50 TABLET ORAL 2 TIMES DAILY
Status: DISCONTINUED | OUTPATIENT
Start: 2025-07-18 | End: 2025-07-19

## 2025-07-18 RX ORDER — ONDANSETRON 2 MG/ML
4 INJECTION INTRAMUSCULAR; INTRAVENOUS ONCE
Status: DISCONTINUED | OUTPATIENT
Start: 2025-07-18 | End: 2025-07-18

## 2025-07-18 NOTE — PLAN OF CARE
Pt has been chest pain free, denies any shortness of breath. SR on telemetry with PVCs making rhythm irregular. Awaiting cardiology to see.

## 2025-07-18 NOTE — ED PROVIDER NOTES
Patient Seen in: [facility] Emergency Department        History  Chief Complaint   Patient presents with    Arrythmia/Palpitations     Stated Complaint: pt states tachy, palpatations    Subjective:   55-year-old female, history of A-fib, PACs, on flecainide, on beta-blocker, on baby aspirin, presents with palpitations.  States been feeling them on and off for Wednesday, states she woke up at 2 AM feeling off and little lightheaded as well.  No chest pain.  No pleurisy or hemoptysis.  History of elevated D-dimer and recent CTA earlier this year that was negative for PE.  Sees [facility] cardiology                      Objective:     Past Medical History:    A-fib (HCC)    Anxiety    Arthritis    Chronic atrial fibrillation (HCC)    Constipation    Heart palpitations    Migraines    Morbid obesity with BMI of 40.0-44.9, adult (HCC)    Obesity, unspecified    KARMA (obstructive sleep apnea)    AHI 5 REM AHI 10 Supine AHI 29 non-supine AHI 1 Sao2 Theodore 90%     Pain in joint, shoulder region    right    Palpitations    Presence of other cardiac implants and grafts    Stress    Ventricular tachycardia (HCC)    Wears glasses              Past Surgical History:   Procedure Laterality Date          x2    Colonoscopy                  Social History     Socioeconomic History    Marital status:    Tobacco Use    Smoking status: Never    Smokeless tobacco: Never   Vaping Use    Vaping status: Never Used   Substance and Sexual Activity    Alcohol use: Not Currently     Alcohol/week: 0.0 standard drinks of alcohol     Comment: rarely    Drug use: No    Sexual activity: Yes     Partners: Male     Birth control/protection: Condom     Comment: ablation    Other Topics Concern    Caffeine Concern No    Stress Concern Yes     Comment: pretty high daily    Special Diet No    Exercise No     Social Drivers of Health     Food Insecurity: No Food Insecurity (3/25/2025)    NCSS - Food Insecurity     Worried About Running Out  of Food in the Last Year: No     Ran Out of Food in the Last Year: No   Transportation Needs: No Transportation Needs (3/25/2025)    NCSS - Transportation     Lack of Transportation: No   Housing Stability: Not At Risk (3/25/2025)    NCSS - Housing/Utilities     Has Housing: Yes     Worried About Losing Housing: No     Unable to Get Utilities: No                                Physical Exam    ED Triage Vitals   BP 07/18/25 0319 139/74   Pulse 07/18/25 0308 115   Resp 07/18/25 0319 21   Temp 07/18/25 0319 97.2 °F (36.2 °C)   Temp src 07/18/25 0319 Temporal   SpO2 07/18/25 0308 98 %   O2 Device 07/18/25 0308 None (Room air)       Current Vitals:   Vital Signs  BP: 139/74  Pulse: 115  Resp: 21  Temp: 97.2 °F (36.2 °C)  Temp src: Temporal    Oxygen Therapy  SpO2: 100 %  O2 Device: None (Room air)            Physical Exam  Vitals and nursing note reviewed.   Constitutional:       Appearance: She is not toxic-appearing.   HENT:      Head: Normocephalic.   Eyes:      Extraocular Movements: Extraocular movements intact.   Cardiovascular:      Rate and Rhythm: Normal rate.      Pulses: Normal pulses.      Heart sounds: Normal heart sounds.   Pulmonary:      Effort: Pulmonary effort is normal. No respiratory distress.      Breath sounds: Normal breath sounds.   Musculoskeletal:         General: Normal range of motion.      Cervical back: Normal range of motion and neck supple.   Skin:     General: Skin is warm and dry.   Neurological:      General: No focal deficit present.      Mental Status: She is alert and oriented to person, place, and time.      Cranial Nerves: No cranial nerve deficit.   Psychiatric:         Mood and Affect: Mood normal.         Behavior: Behavior normal.                 ED Course  Labs Reviewed   COMP METABOLIC PANEL (14) - Abnormal; Notable for the following components:       Result Value    Glucose 117 (*)     Potassium 3.3 (*)     BUN 7 (*)     Creatinine 1.16 (*)     eGFR-Cr 56 (*)     All other  components within normal limits   TROPONIN I HIGH SENSITIVITY - Normal   MAGNESIUM - Normal   CBC WITH DIFFERENTIAL WITH PLATELET   RAINBOW DRAW LAVENDER   RAINBOW DRAW LIGHT GREEN   RAINBOW DRAW BLUE     EKG    Rate, intervals and axes as noted on EKG Report.  Rate: 93  Rhythm: Sinus Rhythm  Reading: EKG sinus rhythm 93 bpm.  Normal axis.  No celebrations.  , QRS 92,  ms.  When compared to March 2025, no significant changes are noted    Patient placed on cardiac monitor for telemetry monitoring secondary to palpitations. Interpretation at bedside by me is sinus rhythm.      Repeat EKG sinus rhythm 79 bpm with PACs noted.  No ST elevations.  , QRS 86,  ms                            MDM     I independently interpreted x-ray of the chest without any obvious signs of acute infiltrate    External chart review demonstrates her admission in March for similar complaints and cardiology workup     differential diagnosis includes, but not limited to, arrhythmia, ACS, costochondritis, GERD, anxiety    55-year-old female palpitations, lightheadedness with the palpitations, not in chest tightness here in the ER.  PACs and EKG which she has a history of.  Very small atrial fibrillation status post ablation.  On aspirin.  History of elevated D-dimer with negative CTA earlier this year.  No pleurisy, no hemoptysis.  She sinus rhythm with PACs now.  Does appear quite anxious.  History of anxiety as well.  Discussed everything with her, she is comfortable going home even with close follow-up with cardiology, want to be admitted to hospital, states she is feeling \"worse.\"  Observation status, she is requesting go to the floor.  Admitted to Dr. Roa after discussion with [facility] cardiology and they will see in consultation.        Admission disposition: 7/18/2025  4:47 AM           Medical Decision Making      Disposition and Plan     Clinical Impression:  1. Acute chest pain    2. Palpitations          Disposition:  Admit  7/18/2025  4:47 am    Follow-up:  No follow-up provider specified.        Medications Prescribed:  Current Discharge Medication List                Supplementary Documentation:         Hospital Problems       Present on Admission  Date Reviewed: 5/20/2025          ICD-10-CM Noted POA    * (Principal) Acute chest pain R07.9 7/18/2025 Unknown    Hyperglycemia R73.9 7/18/2025 Yes

## 2025-07-18 NOTE — ED QUICK NOTES
Orders for admission, patient is aware of plan and ready to go upstairs. Any questions, please call ED RN julio at extension [phone].     Patient Covid vaccination status: Fully vaccinated     COVID Test Ordered in ED: None    COVID Suspicion at Admission: N/A    Running Infusions: Medication Infusions[1]     Mental Status/LOC at time of transport: a/o x4    Other pertinent information:   CIWA score: N/A   NIH score:  N/A             [1]

## 2025-07-18 NOTE — H&P
[facility]     Angelique Mccloud Patient Status:  Observation    1970 MRN [id number]   Location [facility] 3NE-A Attending Miriam Roa MD   Hosp Day # 0 PCP Lu Rock MD     Chief Complaint: palpitation     Subjective:    History of Present Illness:     Angelique Mccloud is a 55 year old female with  Afib on flecainide and BB for many years presented with palpitation and some chest discomfort x2 days. She has had some dizziness and nausea associated with it. No fever, chills, diarrhea, abd pain, SOB< LE edema.     History/Other:    Past Medical History:  Past Medical History[1]  Past Surgical History:   Past Surgical History[2]   Family History:   Family History[3]  Social History:    reports that she has never smoked. She has never used smokeless tobacco. She reports that she does not currently use alcohol. She reports that she does not use drugs.     Allergies: Allergies[4]    Medications:  Medications Ordered Prior to Encounter[5]    Review of Systems:   A comprehensive review of systems was completed.    Pertinent positives and negatives noted in the HPI.    Objective:   Physical Exam:    /74 (BP Location: Left arm)   Pulse 77   Temp 97.7 °F (36.5 °C) (Axillary)   Resp 18   Wt 205 lb 0.4 oz (93 kg)   LMP 2013   SpO2 96%   BMI 36.32 kg/m²   General: No acute distress, Alert  Respiratory: No rhonchi, no wheezes  Cardiovascular: S1, S2. Regular rate and rhythm  Abdomen: Soft, Non-tender, non-distended, positive bowel sounds  Neuro: No new focal deficits  Extremities: No edema      Results:    Labs:      Labs Last 24 Hours:    Recent Labs   Lab 25  0324   RBC 4.99   HGB 15.3   HCT 43.9   MCV 88.0   MCH 30.7   MCHC 34.9   RDW 12.3   NEPRELIM 4.67   WBC 8.3   .0       Recent Labs   Lab 25  0324   *   BUN 7*   CREATSERUM 1.16*   EGFRCR 56*   CA 9.2   ALB 4.5      K 3.3*      CO2 27.0   ALKPHO 86   AST 16    ALT 12   BILT 0.5   TP 7.2       Estimated Glomerular Filtration Rate: 56 mL/min/1.73m2 (A) (result from lab).    Lab Results   Component Value Date    INR 0.94 2025       Recent Labs   Lab 25  0324   TROPHS <3       No results for input(s): \"TROP\", \"PBNP\" in the last 168 hours.    No results for input(s): \"PCT\" in the last 168 hours.    Imaging: Imaging data reviewed in Epic.    Assessment & Plan:      # atypical chest pain   - tele   - serial trop  - cont ASA  - cardiology consult     # chronic Afib  - cont home flecainide and BB   - tele  - replace lyte     # fatigue   # palpitation   # obesity BMI36        Plan of care discussed with patient and ER, RN    Miriam Roa MD    Supplementary Documentation:     The  Century Cures Act makes medical notes like these available to patients in the interest of transparency. Please be advised this is a medical document. Medical documents are intended to carry relevant information, facts as evident, and the clinical opinion of the practitioner. The medical note is intended as peer to peer communication and may appear blunt or direct. It is written in medical language and may contain abbreviations or verbiage that are unfamiliar.                                       [1]   Past Medical History:   A-fib (HCC)    Anxiety    Arthritis    Chronic atrial fibrillation (HCC)    Constipation    Heart palpitations    Migraines    Morbid obesity with BMI of 40.0-44.9, adult (HCC)    Obesity, unspecified    KARMA (obstructive sleep apnea)    AHI 5 REM AHI 10 Supine AHI 29 non-supine AHI 1 Sao2 Theodore 90%     Pain in joint, shoulder region    right    Palpitations    Presence of other cardiac implants and grafts    Stress    Ventricular tachycardia (HCC)    Wears glasses   [2]   Past Surgical History:  Procedure Laterality Date          x2    Colonoscopy     [3]   Family History  Problem Relation Age of Onset    Cancer Father         colon?    Diabetes Father      Crohn's Disease Father     Hypertension Father     Ulcerative Colitis Mother     Ulcerative Colitis Son     Cancer Paternal Grandmother         mandibular cancer    Diabetes Sister     Heart Attack Sister    [4] No Known Allergies  [5]   No current facility-administered medications on file prior to encounter.     Current Outpatient Medications on File Prior to Encounter   Medication Sig Dispense Refill    Tirzepatide-Weight Management (ZEPBOUND) 5 MG/0.5ML Subcutaneous Solution Auto-injector Inject 5 mg into the skin once a week. 2 mL 5    pantoprazole 40 MG Oral Tab EC Take 1 tablet (40 mg total) by mouth every morning before breakfast. 90 tablet 1    escitalopram (LEXAPRO) 5 MG Oral Tab Take 1 tablet (5 mg total) by mouth daily. 90 tablet 0    metoprolol succinate ER 25 MG Oral Tablet 24 Hr Take 0.5 tablets (12.5 mg total) by mouth in the morning.      Zinc 50 MG Oral Cap Take 1 capsule by mouth in the morning.      Cholecalciferol (VITAMIN D) 125 MCG (5000 UT) Oral Cap Take 1 capsule (5,000 Units total) by mouth in the morning.      Bacillus Coagulans-Inulin (PROBIOTIC) 1-250 BILLION-MG Oral Cap Take 1 capsule by mouth in the morning.      Flecainide Acetate 50 MG Oral Tab Take 1 tablet (50 mg total) by mouth in the morning and 1 tablet (50 mg total) before bedtime.      aspirin 81 MG Oral Chew Tab Chew 1 tablet (81 mg total) by mouth in the morning.      Magnesium Oxide 400 (240 MG) MG Oral Tab Take 1 tablet (400 mg total) by mouth in the morning.  3

## 2025-07-18 NOTE — PROGRESS NOTES
Send pt's home medications via moxi going to pharmacy thru charge RN. Pt doesn't want to take meds from the hospital only her home meds

## 2025-07-18 NOTE — DIETARY NOTE
[facility]   part of [facility]   CLINICAL NUTRITION    Angelique Mccloud     Admitting diagnosis:  Palpitations [R00.2]  Acute chest pain [R07.9]    Ht:  5'3\"  Wt: 93 kg (205 lb 0.4 oz).   Body mass index is 36.32 kg/m².  IBW: 52.3kg    Wt Readings from Last 6 Encounters:   07/18/25 93 kg (205 lb 0.4 oz)   04/16/25 97.5 kg (215 lb)   03/25/25 99.6 kg (219 lb 9.3 oz)   03/13/25 103.4 kg (227 lb 15.3 oz)   01/27/25 99.8 kg (220 lb)   12/16/24 103 kg (227 lb)        Labs/Meds reviewed    Diet:       Procedures    Cardiac diet Cardiac; Is Patient on Accuchecks? No     Percent Meals Eaten (last 3 days)       None              Pt chart reviewed d/t MST.  Patient reports good appetite at this time.  Pt reports she ate around 50% of her breakfast  Tolerating po diet without diarrhea, emesis, or constipation.   No significant weight changes noted.     PMH is noncontributory.  Pt p/w acute chest pain.  Pt seen for MST 3 for wt loss.  Pt lying in bed with family at bedside. Reports her appetite is fair - was a little nauseous this AM due to not feeling well, but improved at time of visit. No emesis. LBM 7/18. Pt denies wt changes. EMR records indicate a 10#/5% wt loss x 3 mo; 15#/7% wt loss x 6 mo (not significant per ASPEN guidelines).  Feels she is eating well PTA.  Takes Vit D, Zince, and Magnesium at home.  Pt has no questions/concerns re: diet at time of visit.      Patient is at low nutrition risk at this time.    Please consult if patient status changes or nutrition issues arise.    Rachel Hendrickson RD, LDN, [facility]  Clinical Dietitian  Phone [phone]

## 2025-07-18 NOTE — ED INITIAL ASSESSMENT (HPI)
Pt states feeling heart palpitations for th last couple of days. No chest pain. Hx of afib, states \"feels like my heart isn't in rhythm\". States dizziness.

## 2025-07-18 NOTE — PROGRESS NOTES
[facility]   part of [facility]     Hospitalist Progress Note     Angelique Mccloud Patient Status:  Observation    1970 MRN [id number]   Location [facility] 3NE-A Attending David Vogel,    Hosp Day # 0 PCP Lu Rock MD     Chief Complaint: Palpitations    Subjective:     Patient still with fatigue and palpitations. States this started a few days ago. She wants to take her own medications from home due to a manufacturing issue. States this wasn't a problem before in the hospital in March.     Objective:    Review of Systems:   A comprehensive review of systems was completed; pertinent positive and negatives stated in subjective.    Vital signs:  Temp:  [97.2 °F (36.2 °C)-97.7 °F (36.5 °C)] 97.7 °F (36.5 °C)  Pulse:  [] 77  Resp:  [18-21] 18  BP: (136-139)/(74-81) 136/74  SpO2:  [96 %-100 %] 96 %    Physical Exam:    General: No acute distress, awake and alert  Respiratory: No wheezes, no rhonchi  Cardiovascular: S1, S2, regular rate and rhythm  Abdomen: Soft, Non-tender, non-distended, positive bowel sounds  Extremities: No edema    Diagnostic Data:    Labs:  Recent Labs   Lab 25  0324   WBC 8.3   HGB 15.3   MCV 88.0   .0     Recent Labs   Lab 25  0324   *   BUN 7*   CREATSERUM 1.16*   CA 9.2   ALB 4.5      K 3.3*      CO2 27.0   ALKPHO 86   AST 16   ALT 12   BILT 0.5   TP 7.2     Estimated Glomerular Filtration Rate: 56 mL/min/1.73m2 (A) (result from lab).    Recent Labs   Lab 25  0324   TROPHS <3     No results for input(s): \"PTP\", \"INR\" in the last 168 hours.     Microbiology  No results found for this visit on 25.    Imaging: Reviewed in Epic.    Medications: Scheduled Medications[1]    Assessment & Plan:      #Atypical chest pain with palpitations  #PACs  -EKG nonischemic with PVCs  -Tele   -Troponin negative  -Cont ASA  -Cardiology consult   -Echo 2025 with pEF, no WMA     #Paroxysmal Afib  -Cont home flecainide and  BB   -Tele    #Hypokalemia  -Replace per protocol    #Obesity  -Body mass index is 36.32 kg/m².    #GERD  -PPI    #Anxiety  -Lexapro    #KARMA  -KARMA protocol    #Mild BRYAN  -S/p IVF bolus  -Repeat BMP in AM or outpatient if discharged        David Vogel DO    Supplementary Documentation:     Quality:  DVT Mechanical Prophylaxis:   SCDs,    DVT Pharmacologic Prophylaxis   Medication    enoxaparin (Lovenox) 40 MG/0.4ML SUBQ injection 40 mg      DVT Pharmacologic prophylaxis: Aspirin 162 mg       Code Status: Not on file  Dumont: No urinary catheter in place  GRADY: 0-1 day    Discharge is dependent on: Clinical state, consultant recs  At this point Ms. Mccloud is expected to be discharge to: Home    The 21st Century Cures Act makes medical notes like these available to patients in the interest of transparency. Please be advised this is a medical document. Medical documents are intended to carry relevant information, facts as evident, and the clinical opinion of the practitioner. The medical note is intended as peer to peer communication and may appear blunt or direct. It is written in medical language and may contain abbreviations or verbiage that are unfamiliar.               [1]    aspirin  81 mg Oral Daily    escitalopram  5 mg Oral Daily    flecainide  50 mg Oral BID    metoprolol succinate ER  12.5 mg Oral Daily    pantoprazole  40 mg Oral QAM AC    enoxaparin  40 mg Subcutaneous Daily

## 2025-07-19 VITALS
HEART RATE: 62 BPM | RESPIRATION RATE: 18 BRPM | BODY MASS INDEX: 36 KG/M2 | WEIGHT: 205 LBS | DIASTOLIC BLOOD PRESSURE: 83 MMHG | TEMPERATURE: 98 F | OXYGEN SATURATION: 96 % | SYSTOLIC BLOOD PRESSURE: 110 MMHG

## 2025-07-19 LAB
ANION GAP SERPL CALC-SCNC: 7 MMOL/L (ref 0–18)
BUN BLD-MCNC: 11 MG/DL (ref 9–23)
CALCIUM BLD-MCNC: 9.3 MG/DL (ref 8.7–10.6)
CHLORIDE SERPL-SCNC: 105 MMOL/L (ref 98–112)
CO2 SERPL-SCNC: 27 MMOL/L (ref 21–32)
CREAT BLD-MCNC: 0.81 MG/DL (ref 0.55–1.02)
EGFRCR SERPLBLD CKD-EPI 2021: 86 ML/MIN/1.73M2 (ref 60–?)
GLUCOSE BLD-MCNC: 89 MG/DL (ref 70–99)
OSMOLALITY SERPL CALC.SUM OF ELEC: 287 MOSM/KG (ref 275–295)
POTASSIUM SERPL-SCNC: 4 MMOL/L (ref 3.5–5.1)
POTASSIUM SERPL-SCNC: 4 MMOL/L (ref 3.5–5.1)
SODIUM SERPL-SCNC: 139 MMOL/L (ref 136–145)

## 2025-07-19 PROCEDURE — 99239 HOSP IP/OBS DSCHRG MGMT >30: CPT | Performed by: INTERNAL MEDICINE

## 2025-07-19 RX ORDER — FLECAINIDE ACETATE 100 MG/1
100 TABLET ORAL 2 TIMES DAILY
Qty: 60 TABLET | Refills: 0 | Status: SHIPPED | OUTPATIENT
Start: 2025-07-19

## 2025-07-19 NOTE — PROGRESS NOTES
Discharge education provided and all questions answered. Tele removed. Piv removed. Updated on plan to increase flecainide dose and follow up outpatient with cards in 1 week. All needs met on shift. Walked off unit at time of discharge.

## 2025-07-19 NOTE — CONSULTS
[facility]  Cardiology Consultation    Angelique Mccloud Patient Status:  Observation    1970 MRN [id number]   Location [facility] 3NE-A Attending David Vogel,    Hosp Day # 0 PCP Lu Rock MD     Consults      Reason for Consultation     Palpitations        History of Present Illness     Angelique Mccloud is a a(n) 55 year old female here for palpitations. No recent changes to health. PACs are worse than they have been. Chest pain unchanged.    K 3.3 on admit    History:     Past Medical History[1]  Past Surgical History[2]  Family History[3]   reports that she has never smoked. She has never used smokeless tobacco. She reports that she does not currently use alcohol. She reports that she does not use drugs.      Allergies:     Allergies[4]      Medications     Current Hospital Medications[5]      Review of Systems     10 point ROS was negative except        Telemetry:         Telemetry: SR PACs      Physical Exam     Physical Exam   Blood pressure 110/83, pulse 62, temperature 97.5 °F (36.4 °C), temperature source Axillary, resp. rate 18, weight 205 lb 0.4 oz (93 kg), last menstrual period 2013, SpO2 96%, not currently breastfeeding.  Temp (24hrs), Av.6 °F (36.4 °C), Min:97.3 °F (36.3 °C), Max:97.8 °F (36.6 °C)    Wt Readings from Last 3 Encounters:   25 205 lb 0.4 oz (93 kg)   25 211 lb (95.7 kg)   25 215 lb (97.5 kg)       NAD  PERRLA/EOMI  Neck veins not elevated  Carotids- no bruits  CTA bilaterally  Cardiac- RRR  Abdomen- Soft,Nontender, normal BS  Extremities- pulses normal  Edema- no edema  Mood /Affect Congruent  Skin- no lesions            Lab/Radiology Results     Recent Labs   Lab 25  0324 25  0648   * 89   BUN 7* 11   CREATSERUM 1.16* 0.81   EGFRCR 56* 86   CA 9.2 9.3    139   K 3.3* 4.0  4.0    105   CO2 27.0 27.0     Recent Labs   Lab 25  0324   RBC 4.99   HGB 15.3   HCT 43.9   MCV 88.0   MCH 30.7    MCHC 34.9   RDW 12.3   NEPRELIM 4.67   WBC 8.3   .0         [unfilled]  No results for input(s): \"BNP\" in the last 168 hours.  Lab Results   Component Value Date    INR 0.94 03/13/2025     Lab Results   Component Value Date    TROP <0.045 07/30/2019    TROP <0.045 07/30/2019    TROP <0.046 12/28/2018         No results found.   EKG  Result Date: 7/18/2025  Sinus rhythm with Premature supraventricular complexes Otherwise normal ECG When compared with ECG of 18-JUL-2025 02:56, Premature supraventricular complexes are now Present Inverted T waves have replaced nonspecific T wave abnormality in Inferior leads Confirmed by ROSI SCOTT ([id number]) on 7/18/2025 7:54:56 AM    EKG 12 Lead  Result Date: 7/18/2025  Normal sinus rhythm Nonspecific ST abnormality Abnormal ECG When compared with ECG of 25-MAR-2025 19:17, No significant change was found Confirmed by ROSI SCOTT ([id number]) on 7/18/2025 7:54:50 AM        Problem List   Problem List[6]    Diagnostic Testing     ECG 7/18/25       TTE 3/2025  Conclusions:   Left ventricle: The cavity size was normal. Wall thickness was normal.   Systolic function was normal. The estimated ejection fraction was 60-65%, by   visual assessment. No diagnostic evidence for regional wall motion   abnormalities. Left ventricular diastolic function parameters were normal.     Impressions:  No previous study was available for comparison.      Stress Testing/Cath 3/2025  Normal stress echo      EP Procedures   Prior PVI- Dr. Pringle       Assessment     Atypical chest pain  Palpitations-in the past correlated with ectopic beats/PACs, PVCs  Paroxysmal atrial fibrillation prior ablation  Currently on flecainide and beta-blockers.  GERD  Anxiety  Obstructive sleep apnea        Plan     Increase Flec to 100 mg BID  Did not tolerate increased beta blockers in the past  PACs possibly from hypokalemia, however, her K is normal today with continued PACs  Reassurance that there is no danger with  PACs  OK to discharge today. Needs an APN visit at [facility] next week to assess response to flecanide        C5      Enrique Will MD    Cardiac Electrophysiology  [facility]  2025  9:06 AM         [1]   Past Medical History:   A-fib (HCC)    Anxiety    Arthritis    Chronic atrial fibrillation (HCC)    Constipation    Heart palpitations    Migraines    Morbid obesity with BMI of 40.0-44.9, adult (HCC)    Obesity, unspecified    KARMA (obstructive sleep apnea)    AHI 5 REM AHI 10 Supine AHI 29 non-supine AHI 1 Sao2 Theodore 90%     Pain in joint, shoulder region    right    Palpitations    Presence of other cardiac implants and grafts    Stress    Ventricular tachycardia (HCC)    Wears glasses   [2]   Past Surgical History:  Procedure Laterality Date          x2    Colonoscopy     [3]   Family History  Problem Relation Age of Onset    Cancer Father         colon?    Diabetes Father     Crohn's Disease Father     Hypertension Father     Ulcerative Colitis Mother     Ulcerative Colitis Son     Cancer Paternal Grandmother         mandibular cancer    Diabetes Sister     Heart Attack Sister    [4] No Known Allergies  [5]   Current Facility-Administered Medications:     aspirin chewable tab 81 mg, 81 mg, Oral, Daily    escitalopram (Lexapro) tab 5 mg, 5 mg, Oral, Daily    flecainide (Tambocor) tab 50 mg *Patient Supplied*, 50 mg, Oral, BID    pantoprazole (Protonix) DR tab 40 mg, 40 mg, Oral, QAM AC    nitroglycerin (Nitrostat) SL tab 0.4 mg, 0.4 mg, Sublingual, Q5 Min PRN    enoxaparin (Lovenox) 40 MG/0.4ML SUBQ injection 40 mg, 40 mg, Subcutaneous, Daily    glycerin-hypromellose- (Artificial Tears) 0.2-0.2-1 % ophthalmic solution 1 drop, 1 drop, Both Eyes, QID PRN    melatonin tab 3 mg, 3 mg, Oral, Nightly PRN    ondansetron (Zofran) 4 MG/2ML injection 4 mg, 4 mg, Intravenous, Q6H PRN    acetaminophen (Tylenol Extra Strength) tab 500 mg, 500 mg, Oral, Q4H PRN    metoprolol succinate  (Toprol XL) partial tablet 12.5 mg *Patient Supplied*, 12.5 mg, Oral, Q24H  [6]   Patient Active Problem List  Diagnosis    Morbid obesity (HCC)    Vitamin D deficiency    Osteoarthrosis, unspecified whether generalized or localized, lower leg    Plantar fascial fibromatosis    Ventricular tachycardia (HCC)    Atrial fibrillation (HCC)    Anxiety    Generalized anxiety disorder with panic attacks    Encounter for therapeutic drug monitoring    Obesity (BMI 30.0-34.9)    Gastroesophageal reflux disease without esophagitis    Constipation    Stress    Craving for particular food    History of atrial fibrillation    Encounter for monitoring flecainide therapy    Abdominal pain, right upper quadrant    Memory loss    Migraine without aura and without status migrainosus, not intractable    Dyslipidemia    Morbid obesity with BMI of 40.0-44.9, adult (HCC)    KARMA (obstructive sleep apnea)    Heart burn    Primary osteoarthritis of both knees    Syncope, near    Chest pain of uncertain etiology    Obesity (BMI 30-39.9)    Hyperglycemia    Acute chest pain    Palpitations

## 2025-07-19 NOTE — DISCHARGE SUMMARY
[facility]ITAL[facility]  DISCHARGE SUMMARY     Angelique Mccloud Patient Status:  Observation    1970 MRN [id number]   Location [facility] 3NE-A Attending No att. providers found   Hosp Day # 0 PCP Lu Rock MD     Date of Admission: 2025  Date of Discharge: 2025  Discharge Disposition: Home or Self Care    Discharge Diagnosis:   #Atypical chest pain, resolved  #PACs  #Paroxysmal atrial fibrillation  #Obesity  #GERD  #Anxiety  #KARMA  #Mild BRYAN, resolved    History of Present Illness: Angelique Mccloud is a 55 year old female with  Afib on flecainide and BB for many years presented with palpitation and some chest discomfort x2 days. She has had some dizziness and nausea associated with it. No fever, chills, diarrhea, abd pain, SOB< LE edema.     Brief Synopsis: Patient presented with palpitations and atypical chest pain.  EKG showed PACs but no ischemic changes.  Troponin was negative.  Recent echo earlier this year showed preserved ejection fraction and no wall motion abnormalities.  BRYAN resolved with IV fluid bolus.  She was started on increased dose of flecainide and discharged home with outpatient follow-up with PCP and cardiology.    Lace+ Score: 37  59-90 High Risk  29-58 Medium Risk  0-28   Low Risk       TCM Follow-Up Recommendation:  LACE 29-58: Moderate Risk of readmission after discharge from the hospital.    Procedures during hospitalization:   None     Incidental or significant findings and recommendations (brief descriptions):  None    Lab/Test results pending at Discharge:   None    Consultants:  Cardiology    Discharge Medication List:     Discharge Medications        CHANGE how you take these medications        Instructions Prescription details   flecainide 100 MG Tabs  Commonly known as: Tambocor  What changed:   medication strength  how much to take      Take 1 tablet (100 mg total) by mouth in the morning and 1 tablet (100 mg total) before bedtime.   Quantity: 60  tablet  Refills: 0     pantoprazole 40 MG Tbec  Commonly known as: Protonix  What changed: when to take this      Take 1 tablet (40 mg total) by mouth every morning before breakfast.   Quantity: 90 tablet  Refills: 1            CONTINUE taking these medications        Instructions Prescription details   aspirin 81 MG Tbec      Take 1 tablet (81 mg total) by mouth at bedtime.   Refills: 0     escitalopram 5 MG Tabs  Commonly known as: Lexapro      Take 1 tablet (5 mg total) by mouth daily.   Quantity: 90 tablet  Refills: 0     Magnesium Oxide -Mg Supplement 400 (240 Mg) MG Tabs      Take 1 tablet (400 mg total) by mouth in the morning.   Refills: 3     metoprolol succinate ER 25 MG Tb24  Commonly known as: Toprol XL      Take 0.5 tablets (12.5 mg total) by mouth Every afternoon at 2:00 pm.   Refills: 0     Probiotic 1-250 BILLION-MG Caps      Take 1 capsule by mouth in the morning.   Refills: 0     Vitamin D 125 MCG (5000 UT) Caps      Take 1 capsule (5,000 Units total) by mouth in the evening.   Refills: 0     Zepbound 5 MG/0.5ML Soaj  Generic drug: Tirzepatide-Weight Management      Inject 5 mg into the skin once a week.   Quantity: 2 mL  Refills: 5     Zinc 50 MG Caps      Take 1 capsule by mouth in the morning.   Refills: 0               Where to Get Your Medications        These medications were sent to [facility]/pharmacy #[id number] - [place], [place] - [address] [phone], [phone]  [address] [address]      Phone: [phone]   flecainide 100 MG Tabs         [place] No controlled substances prescribed at discharge.     Follow-up appointment:   Lu Rock MD  [address]  [facility] [address] [phone]    Schedule an appointment as soon as possible for a visit in 1 week(s)      [address]  [facility] [address] [phone]  Follow up in 1 week(s)  Schedule a follow up appointment with APN to evaluate response to increase in  Flecanide dose.    Appointments for Next 30 Days 7/19/2025 - 8/18/2025      None            Vital signs:  Temp:  [97.3 °F (36.3 °C)-97.8 °F (36.6 °C)] 97.5 °F (36.4 °C)  Pulse:  [57-93] 62  Resp:  [17-18] 18  BP: ()/(58-83) 110/83    Physical Exam:    See progress note dated same day.  -----------------------------------------------------------------------------------------------  PATIENT DISCHARGE INSTRUCTIONS: See electronic chart    David Vogel DO    Time spent:  31 minutes

## 2025-07-19 NOTE — PLAN OF CARE
Assumed care of patient at 1930  A&Ox4, RA, Sinus arrhythmia on tele  Denies pain  Cardiac diet  Up ad batsheva  Cards to see  Safety precautions in place  All needs met at this time    Problem: CARDIOVASCULAR - ADULT  Goal: Maintains optimal cardiac output and hemodynamic stability  Description: INTERVENTIONS:  - Monitor vital signs, rhythm, and trends  - Monitor for bleeding, hypotension and signs of decreased cardiac output  - Evaluate effectiveness of vasoactive medications to optimize hemodynamic stability  - Monitor arterial and/or venous puncture sites for bleeding and/or hematoma  - Assess quality of pulses, skin color and temperature  - Assess for signs of decreased coronary artery perfusion - ex. Angina  - Evaluate fluid balance, assess for edema, trend weights  Outcome: Progressing  Goal: Absence of cardiac arrhythmias or at baseline  Description: INTERVENTIONS:  - Continuous cardiac monitoring, monitor vital signs, obtain 12 lead EKG if indicated  - Evaluate effectiveness of antiarrhythmic and heart rate control medications as ordered  - Initiate emergency measures for life threatening arrhythmias  - Monitor electrolytes and administer replacement therapy as ordered  Outcome: Progressing     Problem: PAIN - ADULT  Goal: Verbalizes/displays adequate comfort level or patient's stated pain goal  Description: INTERVENTIONS:  - Encourage pt to monitor pain and request assistance  - Assess pain using appropriate pain scale  - Administer analgesics based on type and severity of pain and evaluate response  - Implement non-pharmacological measures as appropriate and evaluate response  - Consider cultural and social influences on pain and pain management  - Manage/alleviate anxiety  - Utilize distraction and/or relaxation techniques  - Monitor for opioid side effects  - Notify MD/LIP if interventions unsuccessful or patient reports new pain  - Anticipate increased pain with activity and pre-medicate as  appropriate  Outcome: Progressing

## 2025-07-19 NOTE — PROGRESS NOTES
[facility]   part of [facility]     Hospitalist Progress Note     Angelique Mccloud Patient Status:  Observation    1970 MRN [id number]   Location [facility] 3NE-A Attending David Vogel,    Hosp Day # 0 PCP Lu Rock MD     Chief Complaint: Palpitations    Subjective:     Patient still having symptomatic PVCs. No angina. Awaiting cardiology to come by.    Objective:    Review of Systems:   A comprehensive review of systems was completed; pertinent positive and negatives stated in subjective.    Vital signs:  Temp:  [97.3 °F (36.3 °C)-97.8 °F (36.6 °C)] 97.3 °F (36.3 °C)  Pulse:  [] 62  Resp:  [17-18] 17  BP: ()/(58-82) 102/58    Physical Exam:    General: No acute distress, awake and alert  Respiratory: No wheezes, no rhonchi  Cardiovascular: S1, S2, regular rate and rhythm  Abdomen: Soft, Non-tender, non-distended, positive bowel sounds  Extremities: No edema    Diagnostic Data:    Labs:  Recent Labs   Lab 25  032   WBC 8.3   HGB 15.3   MCV 88.0   .0     Recent Labs   Lab 25  0324 25  0648   * 89   BUN 7* 11   CREATSERUM 1.16* 0.81   CA 9.2 9.3   ALB 4.5  --     139   K 3.3* 4.0  4.0    105   CO2 27.0 27.0   ALKPHO 86  --    AST 16  --    ALT 12  --    BILT 0.5  --    TP 7.2  --      Estimated Glomerular Filtration Rate: 86 mL/min/1.73m2 (result from lab).    Recent Labs   Lab 25  0324 25  0939   TROPHS <3 <3     No results for input(s): \"PTP\", \"INR\" in the last 168 hours.     Microbiology  No results found for this visit on 25.    Imaging: Reviewed in Epic.    Medications: Scheduled Medications[1]    Assessment & Plan:      #Atypical chest pain with palpitations  #PACs  -EKG nonischemic with PVCs  -Tele   -Troponin negative  -Cont ASA  -Cardiology consult   -Echo 2025 with pEF, no WMA     #Paroxysmal Afib  -Cont home flecainide and BB   -Tele    #Obesity  -Body mass index is 36.32  kg/m².    #GERD  -PPI    #Anxiety  -Lexapro    #KARMA  -KARMA protocol    #Mild BRYAN, resolved  -S/p IVF bolus      David Vogel DO    Supplementary Documentation:     Quality:  DVT Mechanical Prophylaxis:   SCDs,    DVT Pharmacologic Prophylaxis   Medication    enoxaparin (Lovenox) 40 MG/0.4ML SUBQ injection 40 mg      DVT Pharmacologic prophylaxis: Aspirin 162 mg       Code Status: Full Code  Dumont: No urinary catheter in place  GRADY: 0-1 day    Discharge is dependent on: Clinical state, consultant recs  At this point Ms. Mccloud is expected to be discharge to: Home    The 21st Century Cures Act makes medical notes like these available to patients in the interest of transparency. Please be advised this is a medical document. Medical documents are intended to carry relevant information, facts as evident, and the clinical opinion of the practitioner. The medical note is intended as peer to peer communication and may appear blunt or direct. It is written in medical language and may contain abbreviations or verbiage that are unfamiliar.               [1]    aspirin  81 mg Oral Daily    escitalopram  5 mg Oral Daily    flecainide  50 mg Oral BID    pantoprazole  40 mg Oral QAM AC    enoxaparin  40 mg Subcutaneous Daily    metoprolol succinate ER  12.5 mg Oral Q24H

## 2025-07-21 ENCOUNTER — PATIENT OUTREACH (OUTPATIENT)
Dept: CASE MANAGEMENT | Age: 55
End: 2025-07-21

## 2025-07-21 NOTE — PROGRESS NOTES
Transitional Care Management   Discharge Date: 25  Contact Date: 2025    Assessment:  TCM Initial Assessment    General:  Assessment completed with: Patient  Patient Subjective: Pt says she is feeling the same.  Chief Complaint: Acute chest pain  Verify patient name and  with patient/ caregiver: Yes    Hospital Stay/Discharge:  Prior to leaving the hospital were your Discharge Instructions reviewed with you?: Yes  Did you receive a copy of your written Discharge Instructions?: Yes  Do you feel better or worse since you left the hospital or emergency department?: Same    Follow - Up Appointment:  Do you have a follow-up appointment?: No  Are there any barriers to getting to your follow-up appointment?: No    Home Health/DME:  Prior to leaving the hospital was Home Health (HH) arranged for you?: No     Prior to leaving the hospital or emergency department was Durable Medical Equipment (DME), medical supplies, or infusions arranged for you?: No  Are DME/medical supply/infusions needs identified by staff during this assessment?: No     Medications/Diet:       Were you given a different diet per your Discharge Instructions?: No     Questions/Concerns:  Do you have any questions or concerns that have not been discussed?: No       Follow-up Appointments:  Your appointments       Date & Time Appointment Department (Center)    2025 10:00 AM CDT Follow up with Tiff Herring PsyD [facility] ([address] [facility]  [address] [id number] [phone]            Transitional [facility] Clinic  Was TCC Ordered: No      Primary Care Provider (If no TCC appointment)  Does patient already have a PCP appointment scheduled? No  Care Manager Attempted to schedule PCP office TCM appointment with patient   -If no appointment scheduled: Explain  pt deferred says she will follow up with Cardiology [facility] Dr. Pringle.     Specialist  Does the patient have any other follow-up appointment(s) that need to be scheduled? Yes   -If yes: Care Manager reviewed upcoming specialist appointments with patient: Yes   -Does the patient need assistance scheduling appointment(s): No      Book By Date: 8/2/25

## 2025-07-29 NOTE — TELEPHONE ENCOUNTER
LMTCB #1 for pt. Pt needs appt today or UC.    

## 2025-08-07 ENCOUNTER — APPOINTMENT (OUTPATIENT)
Dept: CT IMAGING | Age: 55
End: 2025-08-07
Attending: NURSE PRACTITIONER

## 2025-08-07 ENCOUNTER — HOSPITAL ENCOUNTER (OUTPATIENT)
Age: 55
Discharge: HOME | End: 2025-08-07

## 2025-08-07 VITALS
HEART RATE: 78 BPM | TEMPERATURE: 99 F | DIASTOLIC BLOOD PRESSURE: 71 MMHG | HEIGHT: 63 IN | RESPIRATION RATE: 16 BRPM | WEIGHT: 196 LBS | BODY MASS INDEX: 34.73 KG/M2 | OXYGEN SATURATION: 96 % | SYSTOLIC BLOOD PRESSURE: 105 MMHG

## 2025-08-07 DIAGNOSIS — N39.0 ACUTE URINARY TRACT INFECTION: Primary | ICD-10-CM

## 2025-08-07 LAB
#MXD IC: 1 X10ˆ3/UL (ref 0.1–1)
BILIRUB UR QL STRIP: NEGATIVE
BUN BLD-MCNC: 11 MG/DL (ref 7–18)
CHLORIDE BLD-SCNC: 104 MMOL/L (ref 98–112)
CLARITY UR: CLEAR
CO2 BLD-SCNC: 22 MMOL/L (ref 21–32)
COLOR UR: YELLOW
CREAT BLD-MCNC: 0.7 MG/DL (ref 0.55–1.02)
EGFRCR SERPLBLD CKD-EPI 2021: 102 ML/MIN/1.73M2 (ref 60–?)
GLUCOSE BLD-MCNC: 103 MG/DL (ref 70–99)
GLUCOSE UR STRIP-MCNC: NEGATIVE MG/DL
HCT VFR BLD AUTO: 41.6 % (ref 35–48)
HCT VFR BLD CALC: 41 % (ref 34–50)
HGB BLD-MCNC: 14.1 G/DL (ref 12–16)
ISTAT IONIZED CALCIUM FOR CHEM 8: 1.08 MMOL/L (ref 1.12–1.32)
KETONES UR STRIP-MCNC: NEGATIVE MG/DL
LYMPHOCYTES # BLD AUTO: 1.7 X10ˆ3/UL (ref 1–4)
LYMPHOCYTES NFR BLD AUTO: 15.7 %
MCH RBC QN AUTO: 30.7 PG (ref 26–34)
MCHC RBC AUTO-ENTMCNC: 33.9 G/DL (ref 31–37)
MCV RBC AUTO: 90.6 FL (ref 80–100)
MIXED CELL %: 9.4 %
NEUTROPHILS # BLD AUTO: 8.2 X10ˆ3/UL (ref 1.5–7.7)
NEUTROPHILS NFR BLD AUTO: 74.9 %
NITRITE UR QL STRIP: NEGATIVE
PH UR STRIP: 6
PLATELET # BLD AUTO: 222 X10ˆ3/UL (ref 150–450)
POTASSIUM BLD-SCNC: 3.5 MMOL/L (ref 3.6–5.1)
PROT UR STRIP-MCNC: NEGATIVE MG/DL
RBC # BLD AUTO: 4.59 X10ˆ6/UL (ref 3.8–5.3)
SODIUM BLD-SCNC: 138 MMOL/L (ref 136–145)
SP GR UR STRIP: 1.02
UROBILINOGEN UR STRIP-ACNC: <2 MG/DL
WBC # BLD AUTO: 10.9 X10ˆ3/UL (ref 4–11)

## 2025-08-07 PROCEDURE — 80047 BASIC METABLC PNL IONIZED CA: CPT | Performed by: NURSE PRACTITIONER

## 2025-08-07 PROCEDURE — 85025 COMPLETE CBC W/AUTO DIFF WBC: CPT | Performed by: NURSE PRACTITIONER

## 2025-08-07 PROCEDURE — 81002 URINALYSIS NONAUTO W/O SCOPE: CPT | Performed by: NURSE PRACTITIONER

## 2025-08-07 PROCEDURE — 96374 THER/PROPH/DIAG INJ IV PUSH: CPT | Performed by: NURSE PRACTITIONER

## 2025-08-07 PROCEDURE — 74176 CT ABD & PELVIS W/O CONTRAST: CPT | Performed by: NURSE PRACTITIONER

## 2025-08-07 PROCEDURE — 96361 HYDRATE IV INFUSION ADD-ON: CPT | Performed by: NURSE PRACTITIONER

## 2025-08-07 PROCEDURE — 99214 OFFICE O/P EST MOD 30 MIN: CPT | Performed by: NURSE PRACTITIONER

## 2025-08-07 RX ORDER — KETOROLAC TROMETHAMINE 30 MG/ML
30 INJECTION, SOLUTION INTRAMUSCULAR; INTRAVENOUS ONCE
Status: DISCONTINUED | OUTPATIENT
Start: 2025-08-07 | End: 2025-08-07

## 2025-08-07 RX ORDER — SODIUM CHLORIDE 9 MG/ML
1000 INJECTION, SOLUTION INTRAVENOUS ONCE
Status: COMPLETED | OUTPATIENT
Start: 2025-08-07 | End: 2025-08-07

## 2025-08-07 RX ORDER — CEPHALEXIN 500 MG/1
500 CAPSULE ORAL 2 TIMES DAILY
Qty: 14 CAPSULE | Refills: 0 | Status: SHIPPED | OUTPATIENT
Start: 2025-08-07 | End: 2025-08-14

## 2025-08-07 RX ORDER — KETOROLAC TROMETHAMINE 30 MG/ML
15 INJECTION, SOLUTION INTRAMUSCULAR; INTRAVENOUS ONCE
Status: COMPLETED | OUTPATIENT
Start: 2025-08-07 | End: 2025-08-07

## 2025-08-10 RX ORDER — PANTOPRAZOLE SODIUM 40 MG/1
40 TABLET, DELAYED RELEASE ORAL
Qty: 90 TABLET | Refills: 0 | Status: SHIPPED | OUTPATIENT
Start: 2025-08-10

## 2025-08-19 ENCOUNTER — LAB ENCOUNTER (OUTPATIENT)
Dept: LAB | Age: 55
End: 2025-08-19
Attending: NURSE PRACTITIONER

## 2025-08-19 DIAGNOSIS — R09.89 LABILE BLOOD PRESSURE: ICD-10-CM

## 2025-08-19 DIAGNOSIS — E87.6 HYPOKALEMIA: Primary | ICD-10-CM

## 2025-08-19 LAB
ANION GAP SERPL CALC-SCNC: 12 MMOL/L (ref 0–18)
BUN BLD-MCNC: 9 MG/DL (ref 9–23)
CALCIUM BLD-MCNC: 9.4 MG/DL (ref 8.7–10.6)
CHLORIDE SERPL-SCNC: 106 MMOL/L (ref 98–112)
CO2 SERPL-SCNC: 25 MMOL/L (ref 21–32)
CREAT BLD-MCNC: 0.77 MG/DL (ref 0.55–1.02)
EGFRCR SERPLBLD CKD-EPI 2021: 91 ML/MIN/1.73M2 (ref 60–?)
FASTING STATUS PATIENT QL REPORTED: NO
GLUCOSE BLD-MCNC: 88 MG/DL (ref 70–99)
MAGNESIUM SERPL-MCNC: 2 MG/DL (ref 1.6–2.6)
OSMOLALITY SERPL CALC.SUM OF ELEC: 294 MOSM/KG (ref 275–295)
POTASSIUM SERPL-SCNC: 3.8 MMOL/L (ref 3.5–5.1)
SODIUM SERPL-SCNC: 143 MMOL/L (ref 136–145)

## 2025-08-19 PROCEDURE — 36415 COLL VENOUS BLD VENIPUNCTURE: CPT

## 2025-08-19 PROCEDURE — 83735 ASSAY OF MAGNESIUM: CPT

## 2025-08-19 PROCEDURE — 80048 BASIC METABOLIC PNL TOTAL CA: CPT

## 2025-08-19 PROCEDURE — 84244 ASSAY OF RENIN: CPT

## 2025-08-19 PROCEDURE — 82088 ASSAY OF ALDOSTERONE: CPT

## 2025-08-27 ENCOUNTER — PATIENT MESSAGE (OUTPATIENT)
Dept: SURGERY | Facility: CLINIC | Age: 55
End: 2025-08-27

## 2025-08-27 DIAGNOSIS — E66.9 OBESITY (BMI 30-39.9): Primary | ICD-10-CM

## 2025-08-27 DIAGNOSIS — F41.0 GENERALIZED ANXIETY DISORDER WITH PANIC ATTACKS: ICD-10-CM

## 2025-08-27 DIAGNOSIS — F41.1 GENERALIZED ANXIETY DISORDER WITH PANIC ATTACKS: ICD-10-CM

## 2025-08-27 LAB
ALDOST/RENIN RATIO: 9
ALDOSTERONE: 8.4 NG/DL
RENIN ACTIVITY: 0.94 NG/ML/HR

## 2025-08-27 RX ORDER — ESCITALOPRAM OXALATE 10 MG/1
10 TABLET ORAL DAILY
Qty: 90 TABLET | Refills: 1 | Status: SHIPPED | OUTPATIENT
Start: 2025-08-27

## 2025-08-27 RX ORDER — TIRZEPATIDE 7.5 MG/.5ML
7.5 INJECTION, SOLUTION SUBCUTANEOUS WEEKLY
Qty: 2 ML | Refills: 5 | Status: SHIPPED | OUTPATIENT
Start: 2025-08-27

## (undated) NOTE — LETTER
11/06/19        Jose [place] Pierre  [facility] [address]      Dear Radha Hook,    Our records indicate that you have outstanding lab work and or testing that was ordered for you and has not yet been completed:  Orders Placed This Encoun

## (undated) NOTE — Clinical Note
Thank you for referring Ozzy to the [facility]. I met with her in consultation today. I have ordered labs, set up a nutrition consultation with our dietician.   She was started on Saxenda for medication therapy and will follow-up with calvin

## (undated) NOTE — Clinical Note
Afshin Priest, pt feeling better since discharge.  Has scheduled visit with you.  Thank you, Leah

## (undated) NOTE — LETTER
AUTHORIZATION FOR SURGICAL OPERATION OR OTHER PROCEDURE    1. I hereby authorize Dr. Crenshaw, and [facility] staff assigned to my case to perform the following operation and/or procedure at the [facility] site:     Right foot cortisone injection    _______________________________________________________________________________________________      _______________________________________________________________________________________________    2.  My physician has explained the nature and purpose of the operation or other procedure, possible alternative methods of treatment, the risks involved, and the possibility of complication to me.  I acknowledge that no guarantee has been made as to the result that may be obtained.  3.  I recognize that, during the course of this operation, or other procedure, unforseen conditions may necessitate additional or different procedure than those listed above.  I, therefore, further authorize and request that the above named physician, his/her physician assistants or designees perform such procedures as are, in his/her professional opinion, necessary and desirable.  4.  Any tissue or organs removed in the operation or other procedure may be disposed of by and at the discretion of the [facility] and [facility].  5.  I understand that in the event of a medical emergency, I will be transported by local paramedics to [facility] or other hospital emergency department.  6.  I certify that I have read and fully understand the above consent to operation and/or other procedure.    7.  I acknowledge that my physician has explained sedation/analgesia administration to me including the risks and benefits.  I consent to the administration of sedation/analgesia as may be necessary or desirable in the judgement of my physician.    Witness signature: ___________________________________________________ Date:   ______/______/_____                    Time:  ________ A.M.  P.M.       Patient Name:  ______________________________________________________  (please print)      Patient signature:  ___________________________________________________             Relationship to Patient:           []  Parent    Responsible person                          []  Spouse  In case of minor or                    [] Other  _____________   Incompetent name:  __________________________________________________                               (please print)      _____________      Responsible person  In case of minor or  Incompetent signature:  _______________________________________________    Statement of Physician  My signature below affirms that prior to the time of the procedure, I have explained to the patient and/or his/her guardian, the risks and benefits involved in the proposed treatment and any reasonable alternative to the proposed treatment.  I have also explained the risks and benefits involved in the refusal of the proposed treatment and have answered the patient's questions.                        Date:  ______/______/_______  Provider                      Signature:  __________________________________________________________       Time:  ___________ A.M    P.M.

## (undated) NOTE — LETTER
AUTHORIZATION FOR SURGICAL OPERATION OR OTHER PROCEDURE    1.  I hereby authorize Dr. Mauricio Kim, and [facility] staff assigned to my case to perform the following operation and/or procedure at the [facility]:    ________________________________ Time:  ________ A. M.  P.M.        Patient Name:  ______________________________________________________  (please print)      Patient signature:  ___________________________________________________             Relationship to Patient:

## (undated) NOTE — LETTER
[facility] [address], [address]  Consent for Procedure/Sedation    Date:     Time:       1. I authorize the performance upon Hersede Martins the following:  ELECTROPHYSIOLOGY STUDY AND CRYOABLATION     2.  I authorize Dr. Angela Cruz ________________________________    ___________________    Witness: _________________________      Date: ___________________    Printed: 3/19/2018   8:51 AM  Patient Name: Fausto Valdovinos        : 1970       Medical Record #: [id number]

## (undated) NOTE — LETTER
Date: 2/22/2019    Patient Name: Amber Fang          To Whom it may concern: This letter has been written at the patient's request. The above patient was seen at the [facility] for treatment of a medical condition.     This patient s

## (undated) NOTE — ED AVS SNAPSHOT
Kerri Kuhn   MRN: [id number]    Department:  [facility] Emergency Department   Date of Visit:  10/3/2018           Disclosure     Insurance plans vary and the physician(s) referred by the ER may not be covered by your plan.  Please contact y tell this physician (or your personal doctor if your instructions are to return to your personal doctor) about any new or lasting problems. The primary care or specialist physician will see patients referred from the [facility] Emergency Department.  Heriberto Seymour

## (undated) NOTE — MR AVS SNAPSHOT
[place]  [address]  [address] [id number] [phone]               Thank you for choosing us for your health care visit with Shelli Jaffe NP.   We are glad to serve you and happy to provide you with this sum Vitamin D3 1000 units Tabs   Take 1,000 Units by mouth daily. Commonly known as:  VITAMIN D3                Where to Get Your Medications      These medications were sent to [facility][id number] - [address], [address] [facility] [address]  [address] [facility]

## (undated) NOTE — LETTER
[facility] [address], [address] [place] [address]    Consent for Anesthesia   1.    Katty Aguero agree to be cared for by an anesthesiologist, who is specially trained to monitor me and give me medicine to put me to sleep or keep me comfor vision, nerves, or muscles and in extremely rare instances death. 5. My doctor has explained to me other choices available to me for my care (alternatives).   6. Pregnant Patients (“epidural”):  I understand that the risks of having an epidural (medicine g

## (undated) NOTE — LETTER
Date: 5/21/2018    Patient Name: Antoine Cowart          To Whom it may concern:      The above patient was seen at the [facility] for treatment of a medical condition ruptured ovarian cyst.    This patient should be excused from attending

## (undated) NOTE — Clinical Note
Thanks for the referral.   I am going to start her on Zepbound. This may help with her sugar cravings and weight loss. I have also seen this help joint pain.   Will also start Lexapro for her increased stress.   Thanks  Chidi Dwyer MD

## (undated) NOTE — ED AVS SNAPSHOT
Ileana Yung   MRN: [id number]    Department:  [facility] Emergency Department   Date of Visit:  10/27/2017           Disclosure     Insurance plans vary and the physician(s) referred by the ER may not be covered by your plan.  Please contact If you have been prescribed any medication(s), please fill your prescription right away and begin taking the medication(s) as directed    If the emergency physician has read X-rays, these will be re-interpreted by a radiologist.  If there is a significant

## (undated) NOTE — ED AVS SNAPSHOT
Abigail Garcia   MRN: [id number]    Department:  [facility] Emergency Department   Date of Visit:  7/30/2019           Disclosure     Insurance plans vary and the physician(s) referred by the ER may not be covered by your plan.  Please contact y tell this physician (or your personal doctor if your instructions are to return to your personal doctor) about any new or lasting problems. The primary care or specialist physician will see patients referred from the [facility] Emergency Department.  Cheryle Levins

## (undated) NOTE — Clinical Note
Hi Dr. Rock, pt feeling the same since discharge.  Did not schedule visit with you says she plans on following up with [facility]  Thank you, Leah

## (undated) NOTE — ED AVS SNAPSHOT
Sheridan Pain   MRN: [id number]    Department:  [facility] Emergency Department   Date of Visit:  8/27/2018           Disclosure     Insurance plans vary and the physician(s) referred by the ER may not be covered by your plan.  Please contact y tell this physician (or your personal doctor if your instructions are to return to your personal doctor) about any new or lasting problems. The primary care or specialist physician will see patients referred from the [facility] Emergency Department.  Anu Mendoza

## (undated) NOTE — LETTER
[facility] IN [place]  [address] [address]  Dept: [phone]  Dept Fax: [phone]      July 7, 2017    Patient: Maegan Galvin   Date of Visit: 7/7/2017       To Whom It May Concern:    Yudy Chambers was seen and t

## (undated) NOTE — LETTER
09/13/19        [facility] [address]      Dear Som Chavez,    Our records indicate that you have outstanding lab work and or testing that was ordered for you and has not yet been completed: Mammogram - - Please conta

## (undated) NOTE — LETTER
[address] [facility] [place], [address] [facility]  [phone]            October 22, 2019      [facility] [address]      Dear Ms. Chiang

## (undated) NOTE — LETTER
AUTHORIZATION FOR SURGICAL OPERATION OR OTHER PROCEDURE    1.  I hereby authorize , and [facility] staff assigned to my case to perform the following operation and/or procedure at the [facility]:    _________________________________ ________ A. M.  P.M.        Patient Name:  ______________________________________________________  (please print)      Patient signature:  ___________________________________________________             Relationship to Patient:           []  Parent    Respon

## (undated) NOTE — ED AVS SNAPSHOT
Ileana Yung   MRN: [id number]    Department:  [facility] Emergency Department   Date of Visit:  12/28/2018           Disclosure     Insurance plans vary and the physician(s) referred by the ER may not be covered by your plan.  Please contact tell this physician (or your personal doctor if your instructions are to return to your personal doctor) about any new or lasting problems. The primary care or specialist physician will see patients referred from the [facility] Emergency Department.  Salvadore Skiff

## (undated) NOTE — ED AVS SNAPSHOT
[facility] in [address] [place] [address] Drive,[address]    [address] [place] [address]    Phone:  [phone]    Fax:  Ap Mckeon   MRN: [id number]    Department:  [facility]   Date of Visit:  3/8/2017 Discharge Instructions       Chest XR is normal   Rx Meclizine 25 mg every 8 hours as needed for vertigo   Hydrate yourself well, Consume at least 6 small meals - eat well balanced diet - carbs, protein and fats   Follow up with PMD in the next 5-7 days this physician (or your personal doctor if your instructions are to return to your personal doctor) about any new or lasting problems. The primary care or specialist physician will see patients referred from the [facility].  Follow-up care is at you to explore options for quitting.     - If you have concerns related to behavioral health issues or thoughts of harming yourself, contact [facility] at [phone].     - If you don’t have insurance, Phan Reich

## (undated) NOTE — LETTER
01/06/21        [facility] [address]      Dear Stella,    Our records indicate that you have outstanding lab work and or testing that was ordered for you and has not yet been completed:  Orders Placed This Encoun

## (undated) NOTE — Clinical Note
Patient was seen for their 1 month f/u at [facility] with a total weight loss of 7# since initial consult.

## (undated) NOTE — LETTER
AUTHORIZATION FOR SURGICAL OPERATION OR OTHER PROCEDURE    1. I hereby authorize Dr. Lachelle Salcedo, and [facility] staff assigned to my case to perform the following operation and/or procedure at the [facility]:    _______________________________________________________________________________________________    Cortisone Injection Left foot  _______________________________________________________________________________________________    2. My physician has explained the nature and purpose of the operation or other procedure, possible alternative methods of treatment, the risks involved, and the possibility of complication to me. I acknowledge that no guarantee has been made as to the result that may be obtained. 3.  I recognize that, during the course of this operation, or other procedure, unforseen conditions may necessitate additional or different procedure than those listed above. I, therefore, further authorize and request that the above named physician, his/her physician assistants or designees perform such procedures as are, in his/her professional opinion, necessary and desirable. 4.  Any tissue or organs removed in the operation or other procedure may be disposed of by and at the discretion of the [facility] and [facility]. 5.  I understand that in the event of a medical emergency, I will be transported by local paramedics to [facility] or other hospital emergency department. 6.  I certify that I have read and fully understand the above consent to operation and/or other procedure. 7.  I acknowledge that my physician has explained sedation/analgesia administration to me including the risks and benefits. I consent to the administration of sedation/analgesia as may be necessary or desirable in the judgement of my physician.     Witness signature: ___________________________________________________ Date:  ______/______/_____ Time:  ________ A. M.  P.M. Patient Name:  ______________________________________________________  (please print)      Patient signature:  ___________________________________________________             Relationship to Patient:           []  Parent    Responsible person                          []  Spouse  In case of minor or                    [] Other  _____________   Incompetent name:  __________________________________________________                               (please print)      _____________      Responsible person  In case of minor or  Incompetent signature:  _______________________________________________    Statement of Physician  My signature below affirms that prior to the time of the procedure, I have explained to the patient and/or his/her guardian, the risks and benefits involved in the proposed treatment and any reasonable alternative to the proposed treatment. I have also explained the risks and benefits involved in the refusal of the proposed treatment and have answered the patient's questions.                         Date:  ______/______/_______  Provider                      Signature:  __________________________________________________________       Time:  ___________ A.M    P.M.

## (undated) NOTE — Clinical Note
Patient was seen for their 2 month f/u at [facility] with a total weight loss of 4# since initial consult. She has a very strong craving for sweets, likely triggered by her high level of stress.  We discussed stress management and medication options to help

## (undated) NOTE — ED AVS SNAPSHOT
Nakia Beck   MRN: [id number]    Department:  [facility] Emergency Department in [place]   Date of Visit:  7/14/2018           Disclosure     Insurance plans vary and the physician(s) referred by the ER may not be covered by your plan.  Please cont tell this physician (or your personal doctor if your instructions are to return to your personal doctor) about any new or lasting problems. The primary care or specialist physician will see patients referred from the [facility] Emergency Department.  Arthor Bernheim

## (undated) NOTE — LETTER
Date & Time: 5/11/2021, 7:01 PM  Patient: Denny Mendez  Encounter Provider(s): Boom Suarez MD       To Whom It May Concern:    Stacy Kelly was seen and treated in our department on 5/11/2021. She should not return to work until 5/13/21.     I

## (undated) NOTE — LETTER
Date: 8/11/2024    Patient Name: Angelique Mccloud          To Whom it may concern:    This letter has been written at the patient's request. The above patient was seen at [facility] for treatment of a medical condition.    This patient should be excused from attending work/school.    The patient may return to work/school when fever free x 24 hours and symptoms improving. Wear mask for 5 days upon return.         Sincerely,    OZ Byrd

## (undated) NOTE — LETTER
[facility] [address], [address]  Consent for Procedure/Sedation    Date:        Time:       1. I authorize the performance upon Malcom Babin the following:  Loop Recorder Implant     2.  I authorize Dr. Samira Mccray (and whomever ________________________________    ___________________    Witness: _________________________      Date: ___________________    Printed: 9/10/2019   12:45 PM  Patient Name: Jose Luis Pierre        : 1970       Medical Record #: [id number]